# Patient Record
Sex: MALE | Race: WHITE | NOT HISPANIC OR LATINO | Employment: OTHER | URBAN - METROPOLITAN AREA
[De-identification: names, ages, dates, MRNs, and addresses within clinical notes are randomized per-mention and may not be internally consistent; named-entity substitution may affect disease eponyms.]

---

## 2017-01-23 ENCOUNTER — HOSPITAL ENCOUNTER (INPATIENT)
Facility: CLINIC | Age: 42
LOS: 8 days | Discharge: HOME | DRG: 021 | End: 2017-02-01
Attending: EMERGENCY MEDICINE | Admitting: INTERNAL MEDICINE
Payer: COMMERCIAL

## 2017-01-23 ENCOUNTER — APPOINTMENT (OUTPATIENT)
Dept: CT IMAGING | Facility: CLINIC | Age: 42
DRG: 021 | End: 2017-01-23
Attending: EMERGENCY MEDICINE
Payer: COMMERCIAL

## 2017-01-23 ENCOUNTER — ANESTHESIA EVENT (OUTPATIENT)
Dept: SURGERY | Facility: CLINIC | Age: 42
DRG: 021 | End: 2017-01-23
Payer: COMMERCIAL

## 2017-01-23 ENCOUNTER — APPOINTMENT (OUTPATIENT)
Dept: INTERVENTIONAL RADIOLOGY/VASCULAR | Facility: CLINIC | Age: 42
DRG: 021 | End: 2017-01-23
Attending: PSYCHIATRY & NEUROLOGY
Payer: COMMERCIAL

## 2017-01-23 ENCOUNTER — APPOINTMENT (OUTPATIENT)
Dept: INTERVENTIONAL RADIOLOGY/VASCULAR | Facility: CLINIC | Age: 42
DRG: 021 | End: 2017-01-23
Payer: COMMERCIAL

## 2017-01-23 ENCOUNTER — ANESTHESIA (OUTPATIENT)
Dept: SURGERY | Facility: CLINIC | Age: 42
DRG: 021 | End: 2017-01-23
Payer: COMMERCIAL

## 2017-01-23 DIAGNOSIS — I10 MALIGNANT ESSENTIAL HYPERTENSION: ICD-10-CM

## 2017-01-23 DIAGNOSIS — E87.1 HYPONATREMIA: ICD-10-CM

## 2017-01-23 DIAGNOSIS — R56.9 CONVULSIONS, UNSPECIFIED CONVULSION TYPE (H): ICD-10-CM

## 2017-01-23 DIAGNOSIS — G91.9 HYDROCEPHALUS (H): ICD-10-CM

## 2017-01-23 DIAGNOSIS — I60.9 SUBARACHNOID HEMORRHAGE (H): Primary | ICD-10-CM

## 2017-01-23 DIAGNOSIS — I60.7 CEREBRAL ANEURYSM RUPTURE (H): ICD-10-CM

## 2017-01-23 LAB
ABO + RH BLD: NORMAL
ABO + RH BLD: NORMAL
ANION GAP SERPL CALCULATED.3IONS-SCNC: 6 MMOL/L (ref 3–14)
BASOPHILS # BLD AUTO: 0 10E9/L (ref 0–0.2)
BASOPHILS NFR BLD AUTO: 0.3 %
BLD GP AB SCN SERPL QL: NORMAL
BLOOD BANK CMNT PATIENT-IMP: NORMAL
BUN SERPL-MCNC: 12 MG/DL (ref 7–30)
CALCIUM SERPL-MCNC: 8.6 MG/DL (ref 8.5–10.1)
CHLORIDE SERPL-SCNC: 105 MMOL/L (ref 94–109)
CHOLEST SERPL-MCNC: 129 MG/DL
CO2 SERPL-SCNC: 29 MMOL/L (ref 20–32)
CREAT SERPL-MCNC: 0.68 MG/DL (ref 0.66–1.25)
CRP SERPL-MCNC: <2.9 MG/L (ref 0–8)
DIFFERENTIAL METHOD BLD: NORMAL
EOSINOPHIL # BLD AUTO: 0.2 10E9/L (ref 0–0.7)
EOSINOPHIL NFR BLD AUTO: 3.8 %
ERYTHROCYTE [DISTWIDTH] IN BLOOD BY AUTOMATED COUNT: 12.4 % (ref 10–15)
GFR SERPL CREATININE-BSD FRML MDRD: ABNORMAL ML/MIN/1.7M2
GLUCOSE BLDC GLUCOMTR-MCNC: 139 MG/DL (ref 70–99)
GLUCOSE SERPL-MCNC: 124 MG/DL (ref 70–99)
HBA1C MFR BLD: 4.6 % (ref 4.3–6)
HCT VFR BLD AUTO: 40 % (ref 40–53)
HDLC SERPL-MCNC: 43 MG/DL
HGB BLD-MCNC: 14.4 G/DL (ref 13.3–17.7)
IMM GRANULOCYTES # BLD: 0.1 10E9/L (ref 0–0.4)
IMM GRANULOCYTES NFR BLD: 1.9 %
INTERPRETATION ECG - MUSE: NORMAL
LDLC SERPL CALC-MCNC: 68 MG/DL
LYMPHOCYTES # BLD AUTO: 1.7 10E9/L (ref 0.8–5.3)
LYMPHOCYTES NFR BLD AUTO: 26.2 %
MCH RBC QN AUTO: 32.1 PG (ref 26.5–33)
MCHC RBC AUTO-ENTMCNC: 36 G/DL (ref 31.5–36.5)
MCV RBC AUTO: 89 FL (ref 78–100)
MONOCYTES # BLD AUTO: 1.1 10E9/L (ref 0–1.3)
MONOCYTES NFR BLD AUTO: 16.7 %
NEUTROPHILS # BLD AUTO: 3.2 10E9/L (ref 1.6–8.3)
NEUTROPHILS NFR BLD AUTO: 51.1 %
NONHDLC SERPL-MCNC: 86 MG/DL
NRBC # BLD AUTO: 0 10*3/UL
NRBC BLD AUTO-RTO: 0 /100
PLATELET # BLD AUTO: 180 10E9/L (ref 150–450)
POTASSIUM SERPL-SCNC: 3.5 MMOL/L (ref 3.4–5.3)
RADIOLOGIST FLAGS: ABNORMAL
RBC # BLD AUTO: 4.48 10E12/L (ref 4.4–5.9)
SODIUM SERPL-SCNC: 140 MMOL/L (ref 133–144)
SPECIMEN EXP DATE BLD: NORMAL
TRIGL SERPL-MCNC: 91 MG/DL
TROPONIN I SERPL-MCNC: 0.19 UG/L (ref 0–0.04)
TSH SERPL DL<=0.005 MIU/L-ACNC: 0.61 MU/L (ref 0.4–4)
WBC # BLD AUTO: 6.3 10E9/L (ref 4–11)

## 2017-01-23 PROCEDURE — 25000128 H RX IP 250 OP 636: Performed by: RADIOLOGY

## 2017-01-23 PROCEDURE — 85347 COAGULATION TIME ACTIVATED: CPT

## 2017-01-23 PROCEDURE — 70460 CT HEAD/BRAIN W/DYE: CPT

## 2017-01-23 PROCEDURE — 27210894 ZZH CATH CR6

## 2017-01-23 PROCEDURE — 99285 EMERGENCY DEPT VISIT HI MDM: CPT | Mod: 25

## 2017-01-23 PROCEDURE — 27210889 IR CAROTID CEREBRAL ANGIOGRAM BILATERAL

## 2017-01-23 PROCEDURE — 96365 THER/PROPH/DIAG IV INF INIT: CPT

## 2017-01-23 PROCEDURE — 80061 LIPID PANEL: CPT | Performed by: INTERNAL MEDICINE

## 2017-01-23 PROCEDURE — 80048 BASIC METABOLIC PNL TOTAL CA: CPT | Performed by: EMERGENCY MEDICINE

## 2017-01-23 PROCEDURE — 03LG3DZ OCCLUSION OF INTRACRANIAL ARTERY WITH INTRALUMINAL DEVICE, PERCUTANEOUS APPROACH: ICD-10-PCS | Performed by: RADIOLOGY

## 2017-01-23 PROCEDURE — 00000146 ZZHCL STATISTIC GLUCOSE BY METER IP

## 2017-01-23 PROCEDURE — 70470 CT HEAD/BRAIN W/O & W/DYE: CPT

## 2017-01-23 PROCEDURE — 85025 COMPLETE CBC W/AUTO DIFF WBC: CPT | Performed by: EMERGENCY MEDICINE

## 2017-01-23 PROCEDURE — 25000128 H RX IP 250 OP 636: Performed by: NURSE ANESTHETIST, CERTIFIED REGISTERED

## 2017-01-23 PROCEDURE — 37000009 ZZH ANESTHESIA TECHNICAL FEE, EACH ADDTL 15 MIN

## 2017-01-23 PROCEDURE — 37000008 ZZH ANESTHESIA TECHNICAL FEE, 1ST 30 MIN

## 2017-01-23 PROCEDURE — 96375 TX/PRO/DX INJ NEW DRUG ADDON: CPT

## 2017-01-23 PROCEDURE — 86901 BLOOD TYPING SEROLOGIC RH(D): CPT | Performed by: ANESTHESIOLOGY

## 2017-01-23 PROCEDURE — 25000125 ZZHC RX 250

## 2017-01-23 PROCEDURE — 36415 COLL VENOUS BLD VENIPUNCTURE: CPT | Performed by: INTERNAL MEDICINE

## 2017-01-23 PROCEDURE — 96366 THER/PROPH/DIAG IV INF ADDON: CPT

## 2017-01-23 PROCEDURE — 25000125 ZZHC RX 250: Performed by: NURSE ANESTHETIST, CERTIFIED REGISTERED

## 2017-01-23 PROCEDURE — 25800025 ZZH RX 258: Performed by: ANESTHESIOLOGY

## 2017-01-23 PROCEDURE — 27210738 ZZH ACCESSORY CR2

## 2017-01-23 PROCEDURE — 96368 THER/DIAG CONCURRENT INF: CPT

## 2017-01-23 PROCEDURE — 84484 ASSAY OF TROPONIN QUANT: CPT | Performed by: INTERNAL MEDICINE

## 2017-01-23 PROCEDURE — 25000125 ZZHC RX 250: Performed by: EMERGENCY MEDICINE

## 2017-01-23 PROCEDURE — 83036 HEMOGLOBIN GLYCOSYLATED A1C: CPT | Performed by: INTERNAL MEDICINE

## 2017-01-23 PROCEDURE — 71000012 ZZH RECOVERY PHASE 1 LEVEL 1 FIRST HR

## 2017-01-23 PROCEDURE — 86140 C-REACTIVE PROTEIN: CPT | Performed by: INTERNAL MEDICINE

## 2017-01-23 PROCEDURE — 82306 VITAMIN D 25 HYDROXY: CPT | Performed by: INTERNAL MEDICINE

## 2017-01-23 PROCEDURE — C1769 GUIDE WIRE: HCPCS

## 2017-01-23 PROCEDURE — 25000132 ZZH RX MED GY IP 250 OP 250 PS 637

## 2017-01-23 PROCEDURE — 25000128 H RX IP 250 OP 636: Performed by: PSYCHIATRY & NEUROLOGY

## 2017-01-23 PROCEDURE — 25000128 H RX IP 250 OP 636: Performed by: EMERGENCY MEDICINE

## 2017-01-23 PROCEDURE — 25000125 ZZHC RX 250: Performed by: PSYCHIATRY & NEUROLOGY

## 2017-01-23 PROCEDURE — 86850 RBC ANTIBODY SCREEN: CPT | Performed by: ANESTHESIOLOGY

## 2017-01-23 PROCEDURE — 27210806 ZZH SHEATH CR5

## 2017-01-23 PROCEDURE — 25500064 ZZH RX 255 OP 636: Performed by: RADIOLOGY

## 2017-01-23 PROCEDURE — 93005 ELECTROCARDIOGRAM TRACING: CPT

## 2017-01-23 PROCEDURE — 25000125 ZZHC RX 250: Performed by: RADIOLOGY

## 2017-01-23 PROCEDURE — 27210732 ZZH ACCESSORY CR1

## 2017-01-23 PROCEDURE — 25500064 ZZH RX 255 OP 636: Performed by: EMERGENCY MEDICINE

## 2017-01-23 PROCEDURE — 99223 1ST HOSP IP/OBS HIGH 75: CPT | Mod: AI | Performed by: INTERNAL MEDICINE

## 2017-01-23 PROCEDURE — 40000333 IR DISCONTINUE SHEATH: Mod: TC

## 2017-01-23 PROCEDURE — 40000171 ZZH STATISTIC PRE-PROCEDURE ASSESSMENT III

## 2017-01-23 PROCEDURE — 84443 ASSAY THYROID STIM HORMONE: CPT | Performed by: INTERNAL MEDICINE

## 2017-01-23 PROCEDURE — C1887 CATHETER, GUIDING: HCPCS

## 2017-01-23 PROCEDURE — 86900 BLOOD TYPING SEROLOGIC ABO: CPT | Performed by: ANESTHESIOLOGY

## 2017-01-23 PROCEDURE — 27211141 ZZHC CATH NEURO CR18

## 2017-01-23 PROCEDURE — 25000125 ZZHC RX 250: Performed by: ANESTHESIOLOGY

## 2017-01-23 PROCEDURE — 25000566 ZZH SEVOFLURANE, EA 15 MIN

## 2017-01-23 PROCEDURE — 76377 3D RENDER W/INTRP POSTPROCES: CPT

## 2017-01-23 RX ORDER — HYDROMORPHONE HYDROCHLORIDE 1 MG/ML
.3-.5 INJECTION, SOLUTION INTRAMUSCULAR; INTRAVENOUS; SUBCUTANEOUS EVERY 5 MIN PRN
Status: DISCONTINUED | OUTPATIENT
Start: 2017-01-23 | End: 2017-01-23

## 2017-01-23 RX ORDER — SODIUM CHLORIDE, SODIUM LACTATE, POTASSIUM CHLORIDE, CALCIUM CHLORIDE 600; 310; 30; 20 MG/100ML; MG/100ML; MG/100ML; MG/100ML
INJECTION, SOLUTION INTRAVENOUS CONTINUOUS
Status: DISCONTINUED | OUTPATIENT
Start: 2017-01-23 | End: 2017-01-23 | Stop reason: HOSPADM

## 2017-01-23 RX ORDER — ACETAMINOPHEN 650 MG/1
650 SUPPOSITORY RECTAL EVERY 4 HOURS PRN
Status: DISCONTINUED | OUTPATIENT
Start: 2017-01-23 | End: 2017-02-01 | Stop reason: HOSPADM

## 2017-01-23 RX ORDER — ONDANSETRON 2 MG/ML
4 INJECTION INTRAMUSCULAR; INTRAVENOUS EVERY 6 HOURS PRN
Status: DISCONTINUED | OUTPATIENT
Start: 2017-01-23 | End: 2017-02-01 | Stop reason: HOSPADM

## 2017-01-23 RX ORDER — LIDOCAINE HYDROCHLORIDE 20 MG/ML
INJECTION, SOLUTION INFILTRATION; PERINEURAL PRN
Status: DISCONTINUED | OUTPATIENT
Start: 2017-01-23 | End: 2017-01-23

## 2017-01-23 RX ORDER — METOCLOPRAMIDE 5 MG/1
10 TABLET ORAL EVERY 6 HOURS PRN
Status: DISCONTINUED | OUTPATIENT
Start: 2017-01-23 | End: 2017-02-01 | Stop reason: HOSPADM

## 2017-01-23 RX ORDER — NALOXONE HYDROCHLORIDE 0.4 MG/ML
.1-.4 INJECTION, SOLUTION INTRAMUSCULAR; INTRAVENOUS; SUBCUTANEOUS
Status: DISCONTINUED | OUTPATIENT
Start: 2017-01-23 | End: 2017-02-01 | Stop reason: HOSPADM

## 2017-01-23 RX ORDER — IODIXANOL 320 MG/ML
50 INJECTION, SOLUTION INTRAVASCULAR ONCE
Status: COMPLETED | OUTPATIENT
Start: 2017-01-23 | End: 2017-01-23

## 2017-01-23 RX ORDER — ONDANSETRON 4 MG/1
4 TABLET, ORALLY DISINTEGRATING ORAL EVERY 30 MIN PRN
Status: DISCONTINUED | OUTPATIENT
Start: 2017-01-23 | End: 2017-01-23

## 2017-01-23 RX ORDER — ONDANSETRON 2 MG/ML
4 INJECTION INTRAMUSCULAR; INTRAVENOUS EVERY 30 MIN PRN
Status: DISCONTINUED | OUTPATIENT
Start: 2017-01-23 | End: 2017-01-23

## 2017-01-23 RX ORDER — IOPAMIDOL 612 MG/ML
150 INJECTION, SOLUTION INTRAVASCULAR ONCE
Status: DISCONTINUED | OUTPATIENT
Start: 2017-01-23 | End: 2017-01-23 | Stop reason: HOSPADM

## 2017-01-23 RX ORDER — ACETAMINOPHEN 325 MG/1
TABLET ORAL
Status: COMPLETED
Start: 2017-01-23 | End: 2017-01-23

## 2017-01-23 RX ORDER — NEOSTIGMINE METHYLSULFATE 1 MG/ML
VIAL (ML) INJECTION PRN
Status: DISCONTINUED | OUTPATIENT
Start: 2017-01-23 | End: 2017-01-23

## 2017-01-23 RX ORDER — IOPAMIDOL 612 MG/ML
150 INJECTION, SOLUTION INTRAVASCULAR ONCE
Status: COMPLETED | OUTPATIENT
Start: 2017-01-23 | End: 2017-01-23

## 2017-01-23 RX ORDER — IOPAMIDOL 755 MG/ML
120 INJECTION, SOLUTION INTRAVASCULAR ONCE
Status: COMPLETED | OUTPATIENT
Start: 2017-01-23 | End: 2017-01-23

## 2017-01-23 RX ORDER — HEPARIN SODIUM 1000 [USP'U]/ML
INJECTION, SOLUTION INTRAVENOUS; SUBCUTANEOUS
Status: DISPENSED
Start: 2017-01-23

## 2017-01-23 RX ORDER — PROCHLORPERAZINE 25 MG
25 SUPPOSITORY, RECTAL RECTAL EVERY 12 HOURS PRN
Status: DISCONTINUED | OUTPATIENT
Start: 2017-01-23 | End: 2017-02-01 | Stop reason: HOSPADM

## 2017-01-23 RX ORDER — FENTANYL CITRATE 50 UG/ML
INJECTION, SOLUTION INTRAMUSCULAR; INTRAVENOUS
Status: COMPLETED
Start: 2017-01-23 | End: 2017-01-23

## 2017-01-23 RX ORDER — MULTIPLE VITAMINS W/ MINERALS TAB 9MG-400MCG
1 TAB ORAL DAILY
COMMUNITY

## 2017-01-23 RX ORDER — SODIUM CHLORIDE, SODIUM LACTATE, POTASSIUM CHLORIDE, CALCIUM CHLORIDE 600; 310; 30; 20 MG/100ML; MG/100ML; MG/100ML; MG/100ML
INJECTION, SOLUTION INTRAVENOUS CONTINUOUS
Status: DISCONTINUED | OUTPATIENT
Start: 2017-01-23 | End: 2017-01-23

## 2017-01-23 RX ORDER — FENTANYL CITRATE 50 UG/ML
INJECTION, SOLUTION INTRAMUSCULAR; INTRAVENOUS PRN
Status: DISCONTINUED | OUTPATIENT
Start: 2017-01-23 | End: 2017-01-23

## 2017-01-23 RX ORDER — FENTANYL CITRATE 50 UG/ML
25-50 INJECTION, SOLUTION INTRAMUSCULAR; INTRAVENOUS
Status: DISCONTINUED | OUTPATIENT
Start: 2017-01-23 | End: 2017-01-23

## 2017-01-23 RX ORDER — SODIUM CHLORIDE, SODIUM LACTATE, POTASSIUM CHLORIDE, CALCIUM CHLORIDE 600; 310; 30; 20 MG/100ML; MG/100ML; MG/100ML; MG/100ML
INJECTION, SOLUTION INTRAVENOUS CONTINUOUS
Status: DISCONTINUED | OUTPATIENT
Start: 2017-01-23 | End: 2017-01-24

## 2017-01-23 RX ORDER — OXYCODONE HYDROCHLORIDE 5 MG/1
5-10 TABLET ORAL
Status: DISCONTINUED | OUTPATIENT
Start: 2017-01-23 | End: 2017-02-01 | Stop reason: HOSPADM

## 2017-01-23 RX ORDER — GLYCOPYRROLATE 0.2 MG/ML
INJECTION, SOLUTION INTRAMUSCULAR; INTRAVENOUS PRN
Status: DISCONTINUED | OUTPATIENT
Start: 2017-01-23 | End: 2017-01-23

## 2017-01-23 RX ORDER — METOCLOPRAMIDE HYDROCHLORIDE 5 MG/ML
10 INJECTION INTRAMUSCULAR; INTRAVENOUS EVERY 6 HOURS PRN
Status: DISCONTINUED | OUTPATIENT
Start: 2017-01-23 | End: 2017-02-01 | Stop reason: HOSPADM

## 2017-01-23 RX ORDER — ACETAMINOPHEN 325 MG/1
650 TABLET ORAL EVERY 4 HOURS PRN
Status: DISCONTINUED | OUTPATIENT
Start: 2017-01-23 | End: 2017-02-01 | Stop reason: HOSPADM

## 2017-01-23 RX ORDER — ONDANSETRON 2 MG/ML
4 INJECTION INTRAMUSCULAR; INTRAVENOUS ONCE
Status: COMPLETED | OUTPATIENT
Start: 2017-01-23 | End: 2017-01-23

## 2017-01-23 RX ORDER — SODIUM CHLORIDE 9 MG/ML
1000 INJECTION, SOLUTION INTRAVENOUS CONTINUOUS
Status: DISCONTINUED | OUTPATIENT
Start: 2017-01-23 | End: 2017-01-26

## 2017-01-23 RX ORDER — ONDANSETRON 4 MG/1
4 TABLET, ORALLY DISINTEGRATING ORAL EVERY 6 HOURS PRN
Status: DISCONTINUED | OUTPATIENT
Start: 2017-01-23 | End: 2017-02-01 | Stop reason: HOSPADM

## 2017-01-23 RX ORDER — HEPARIN SODIUM 1000 [USP'U]/ML
INJECTION, SOLUTION INTRAVENOUS; SUBCUTANEOUS CONTINUOUS PRN
Status: DISCONTINUED | OUTPATIENT
Start: 2017-01-23 | End: 2017-01-23

## 2017-01-23 RX ORDER — HYDROMORPHONE HYDROCHLORIDE 1 MG/ML
0.2 INJECTION, SOLUTION INTRAMUSCULAR; INTRAVENOUS; SUBCUTANEOUS
Status: DISCONTINUED | OUTPATIENT
Start: 2017-01-23 | End: 2017-01-29

## 2017-01-23 RX ORDER — HEPARIN SODIUM 1000 [USP'U]/ML
INJECTION, SOLUTION INTRAVENOUS; SUBCUTANEOUS
Status: COMPLETED
Start: 2017-01-23 | End: 2017-01-23

## 2017-01-23 RX ORDER — PROCHLORPERAZINE MALEATE 5 MG
5-10 TABLET ORAL EVERY 6 HOURS PRN
Status: DISCONTINUED | OUTPATIENT
Start: 2017-01-23 | End: 2017-02-01 | Stop reason: HOSPADM

## 2017-01-23 RX ORDER — ONDANSETRON 2 MG/ML
INJECTION INTRAMUSCULAR; INTRAVENOUS PRN
Status: DISCONTINUED | OUTPATIENT
Start: 2017-01-23 | End: 2017-01-23

## 2017-01-23 RX ORDER — FENTANYL CITRATE 50 UG/ML
50 INJECTION, SOLUTION INTRAMUSCULAR; INTRAVENOUS ONCE
Status: COMPLETED | OUTPATIENT
Start: 2017-01-23 | End: 2017-01-23

## 2017-01-23 RX ADMIN — METOCLOPRAMIDE 10 MG: 5 INJECTION, SOLUTION INTRAMUSCULAR; INTRAVENOUS at 20:27

## 2017-01-23 RX ADMIN — ONDANSETRON HYDROCHLORIDE 4 MG: 2 INJECTION, SOLUTION INTRAMUSCULAR; INTRAVENOUS at 18:20

## 2017-01-23 RX ADMIN — LEVETIRACETAM 500 MG: 100 INJECTION, SOLUTION INTRAVENOUS at 10:27

## 2017-01-23 RX ADMIN — NEOSTIGMINE METHYLSULFATE 5 MG: 1 INJECTION INTRAMUSCULAR; INTRAVENOUS; SUBCUTANEOUS at 15:00

## 2017-01-23 RX ADMIN — SODIUM CHLORIDE, POTASSIUM CHLORIDE, SODIUM LACTATE AND CALCIUM CHLORIDE: 600; 310; 30; 20 INJECTION, SOLUTION INTRAVENOUS at 12:25

## 2017-01-23 RX ADMIN — ONDANSETRON 4 MG: 2 INJECTION INTRAMUSCULAR; INTRAVENOUS at 14:42

## 2017-01-23 RX ADMIN — HYDROMORPHONE HYDROCHLORIDE 0.2 MG: 1 INJECTION, SOLUTION INTRAMUSCULAR; INTRAVENOUS; SUBCUTANEOUS at 22:55

## 2017-01-23 RX ADMIN — ROCURONIUM BROMIDE 50 MG: 10 INJECTION INTRAVENOUS at 12:41

## 2017-01-23 RX ADMIN — SODIUM CHLORIDE, POTASSIUM CHLORIDE, SODIUM LACTATE AND CALCIUM CHLORIDE: 600; 310; 30; 20 INJECTION, SOLUTION INTRAVENOUS at 15:09

## 2017-01-23 RX ADMIN — FENTANYL CITRATE 50 MCG: 50 INJECTION, SOLUTION INTRAMUSCULAR; INTRAVENOUS at 10:39

## 2017-01-23 RX ADMIN — SODIUM CHLORIDE 1000 ML: 9 INJECTION, SOLUTION INTRAVENOUS at 09:24

## 2017-01-23 RX ADMIN — NICARDIPINE HYDROCHLORIDE 7.5 MG/HR: 0.2 INJECTION, SOLUTION INTRAVENOUS at 09:45

## 2017-01-23 RX ADMIN — NICARDIPINE HYDROCHLORIDE: 0.2 INJECTION, SOLUTION INTRAVENOUS at 15:15

## 2017-01-23 RX ADMIN — LIDOCAINE HYDROCHLORIDE 50 MG: 20 INJECTION, SOLUTION INFILTRATION; PERINEURAL at 12:41

## 2017-01-23 RX ADMIN — ONDANSETRON HYDROCHLORIDE 4 MG: 2 SOLUTION INTRAMUSCULAR; INTRAVENOUS at 09:09

## 2017-01-23 RX ADMIN — SODIUM CHLORIDE 1000 ML: 9 INJECTION, SOLUTION INTRAVENOUS at 11:16

## 2017-01-23 RX ADMIN — HYDROMORPHONE HYDROCHLORIDE 0.2 MG: 1 INJECTION, SOLUTION INTRAMUSCULAR; INTRAVENOUS; SUBCUTANEOUS at 20:34

## 2017-01-23 RX ADMIN — FENTANYL CITRATE 50 MCG: 50 INJECTION, SOLUTION INTRAMUSCULAR; INTRAVENOUS at 12:41

## 2017-01-23 RX ADMIN — HYDROMORPHONE HYDROCHLORIDE 0.2 MG: 1 INJECTION, SOLUTION INTRAMUSCULAR; INTRAVENOUS; SUBCUTANEOUS at 18:09

## 2017-01-23 RX ADMIN — ACETAMINOPHEN 650 MG: 325 TABLET, FILM COATED ORAL at 19:27

## 2017-01-23 RX ADMIN — IOPAMIDOL 120 ML: 755 INJECTION, SOLUTION INTRAVENOUS at 10:22

## 2017-01-23 RX ADMIN — CISATRACURIUM BESYLATE 6 MG: 2 INJECTION INTRAVENOUS at 13:47

## 2017-01-23 RX ADMIN — SODIUM CHLORIDE, POTASSIUM CHLORIDE, SODIUM LACTATE AND CALCIUM CHLORIDE: 600; 310; 30; 20 INJECTION, SOLUTION INTRAVENOUS at 22:57

## 2017-01-23 RX ADMIN — IOPAMIDOL 135 ML: 612 INJECTION, SOLUTION INTRAVENOUS at 15:12

## 2017-01-23 RX ADMIN — IODIXANOL 20 ML: 320 INJECTION, SOLUTION INTRAVASCULAR at 15:12

## 2017-01-23 RX ADMIN — FENTANYL CITRATE 50 MCG: 50 INJECTION, SOLUTION INTRAMUSCULAR; INTRAVENOUS at 13:05

## 2017-01-23 RX ADMIN — LEVETIRACETAM 500 MG: 100 INJECTION, SOLUTION INTRAVENOUS at 22:57

## 2017-01-23 RX ADMIN — HYDROMORPHONE HYDROCHLORIDE 0.3 MG: 1 INJECTION, SOLUTION INTRAMUSCULAR; INTRAVENOUS; SUBCUTANEOUS at 17:40

## 2017-01-23 RX ADMIN — GLYCOPYRROLATE 1 MG: 0.2 INJECTION, SOLUTION INTRAMUSCULAR; INTRAVENOUS at 15:00

## 2017-01-23 RX ADMIN — SODIUM CHLORIDE: 9 INJECTION, SOLUTION INTRAVENOUS at 12:25

## 2017-01-23 RX ADMIN — SODIUM CHLORIDE 100 ML: 9 INJECTION, SOLUTION INTRAVENOUS at 10:22

## 2017-01-23 RX ADMIN — SODIUM CHLORIDE, POTASSIUM CHLORIDE, SODIUM LACTATE AND CALCIUM CHLORIDE: 600; 310; 30; 20 INJECTION, SOLUTION INTRAVENOUS at 17:51

## 2017-01-23 ASSESSMENT — ENCOUNTER SYMPTOMS
ORTHOPNEA: 0
SEIZURES: 1

## 2017-01-23 ASSESSMENT — LIFESTYLE VARIABLES: TOBACCO_USE: 0

## 2017-01-23 NOTE — IP AVS SNAPSHOT
[facility]    [address]    [place] [address]    Phone:  [phone]                                       After Visit Summary   1/23/2017    Malvin Au    MRN: [id number]           After Visit Summary Signature Page     I have received my discharge instructions, and my questions have been answered. I have discussed any challenges I see with this plan with the nurse or doctor.    ..........................................................................................................................................  Patient/Patient Representative Signature      ..........................................................................................................................................  Patient Representative Print Name and Relationship to Patient    ..................................................               ................................................  Date                                            Time    ..........................................................................................................................................  Reviewed by Signature/Title    ...................................................              ..............................................  Date                                                            Time

## 2017-01-23 NOTE — CONSULTS
[facility] [place]  [facility]    NeuroCritical Care Consultation Note     Malvin Au MRN# [id number]   YOB: 1975 Age: 41 year old    Code Status:No Order   Date of Admission: 1/23/2017  Date of Consult: 01/23/2017    _________________________________   Primary Care Physician  No primary care provider on file.  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   #. DVT Prophylaxis  --Mechanical only due to SAH  #. PT/OT/Speech  --Start  evaluations  #. Nutrition / GI Prophylaxis  --Per recommendations of speech therapy      #. Code Status: Full Code     TIME:   Neurocritical care time:  60 minutes, excluding procedures for evaluation and management of SAH. Patient has a very high risk of deterioration  ----------------------------------------------------------------------------------  ----------------------------------------------------------------------------------  Reason for consult: Code stroke    Chief Complaint  ______________________________________________  Left sided weakness, seizure  History is obtained from the patient    History of Present Illness  ______________________________________________  41 year old male with a history of hypertension who presents with an altered mental status. 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  Upon EMS arrival, he was  drooling, hypertensive at 213/159, and remained unresponsive despite an aggressive sternum rub; they did not notice any tonic clonic behavior or any obvious trauma. Patient was incontinent. When he finally awoke, he was initially confused with post-ictal behavior and his short-term memory was not intact. In ER, he is more oriented and memory is improving. The patient states he does not recall any of the events this morning, but does recall being at home last night watching hockey. When prompted, the patient reports that he has been having constant daily headaches for the past few weeks that slightly improve when treated with Ibuprofen. He denies any pain. Patient was found to have left leg weakness, thus code sandra was called.   CT head demonstrated SAH likely originating from Acom A aneurysm.     Past Medical History   ______________________________________________  History reviewed. No pertinent past medical history.  Past Surgical History  ______________________________________________  History reviewed. No pertinent past surgical history.  Prior to Admission Medications  ______________________________________________  None     Allergies  No Known Allergies    Social History  ______________________________________________  Social History     Social History     Marital Status: Single     Spouse Name: N/A     Number of Children: N/A     Years of Education: N/A     Social History Main Topics     Smoking status: Never Smoker      Smokeless tobacco: None     Alcohol Use: Yes     Drug Use: No     Sexual Activity: Not Asked     Other Topics Concern     None     Social History Narrative     None         Family History  ______________________________________________  Reviewed and not felt to be contributory.     Review of Systems  ______________________________________________  A comprehensive review of  10 systems was performed and found to be negative except as described in this note  CONSTITUTIONAL: negative for fever,  "chills, change in weight  INTEGUMENTARY/SKIN: no rash or obvious new lesions  ENT/MOUTH: no sore throat, new sinus pain or nasal drainage, no neck mass noted  RESP: No pleuretic pain, No cough, no hemoptysis, No SOB   CV: negative for chest pain, palpitations or peripheral edema  GI: no nausea, vomiting, change in stools  : no dysuria or hematuria  MUSCULOSKELETAL: no myalgias, arthralgias or join efffusion  ENDOCRINE: no history of polyuria, polydyspsia or symptoms of thyroid dysfunction  PSYCHIATRIC: no change in mood stable  LYMPHATIC: no new lymphadenopathy  HEME: no bleeding or easy bruisability  NEURO: see HPI    Physical Exam  ______________________________________________  Weight:0 lbs 0 oz; Height:6' 0\"  Temp: 98.1  F (36.7  C) Temp src: Oral BP: (!) 181/115 mmHg   Heart Rate: 89 Resp: 20 SpO2: 94 % O2 Device: None (Room air)    General Appearance:  No acute distress  Neuro:       Mental Status Exam:   Awake, alert, oriented X3. Speech and language are intact. Mental status is normal       Cranial Nerves:  Pupils 3 mm, reactive. EOMI. Face sensation is normal. Face is symmetric.Tongue and uvula are midline. Other CN are normal           Motor:  5/5 X 4. Tone and bulk are normal           Reflexes:  Normal DTR.Toes downgoing.        Sensory:  Normal to PP, LT, vibration and LUCIO                   Coordination:   Intact finger-to-nose and toe-to-finger tests       Gait:  Untestable  Neck: no nuchal rigidity, normal thyroid. No carotid bruits.    Cardiovascular: Regular rate and rhythm, no m/r/g  Lungs: Clear to auscultation  Abdomen: Soft, not tender, not distended  Extremities: No clubbing, no cyanosis, no edema    Data  ______________________________________________  All Data personally reviewed:       Labs:   CBC RESULTS:     Recent Labs  Lab 01/23/17  0915   WBC 6.3   RBC 4.48   HGB 14.4   HCT 40.0        Basic Metabolic Panel:   No lab results found.  Liver panel:  No lab results found.  INR:No " lab results found.   Lipid Profile:No lab results found.  Thyroid Panel:No lab results found.   Vitamin B12: No lab results found.   Vitamin D level: No lab results found.  A1C: No lab results found.  Troponin I: No lab results found.  Ammonia: No lab results found.  CK: No lab results found.     CRP inflammation: No lab results found.  ESR: No lab results found.    MARY: No lab results found.    ANCA: No lab results found.   Drug Screen: No lab results found.  Alcohol level:No lab results found.  UA Results:  No lab results found.  Most Recent 6 Bacteria Isolates From Any Culture (See EPIC Reports for Culture Details):No lab results found.     Cardiac US:   ---       Imaging:   All imaging studies were reviewed personally  CT head:   --Difffuse SAH, likely secondary to acom A aneurysm   CTA neck/head:  --pending

## 2017-01-23 NOTE — ANESTHESIA PREPROCEDURE EVALUATION
Anesthesia Evaluation     . Pt has not had prior anesthetic       ROS/MED HX    ENT/Pulmonary:      (-) tobacco use and asthma   Neurologic: Comment: Unconscious at work    CT OF THE HEAD WITHOUT CONTRAST  1/23/2017 9:42 AM       COMPARISON: None.     HISTORY:  Stroke.     TECHNIQUE: Axial CT images of the head from the skull base to the  vertex were acquired without IV contrast.     FINDINGS: There is hyperdense material filling the suprasellar  cistern, prepontine and premedullary cisterns as well as the inferior  aspect of the anterior interhemispheric fissure and multiple sulci  along the medial aspects of the cerebral hemispheres on both sides.  This is consistent with extensive subarachnoid hemorrhage in a  distribution that suggests aneurysmal rupture.       The lateral and third ventricles are mildly prominent in size  consistent with developing hydrocephalus. There are small foci of  intraventricular hemorrhage layering in the occipital horn of the  right lateral ventricle and left lateral aspect of the fourth  ventricle. This likely represents redistribution of subarachnoid  hemorrhage in the ventricular system.     There is no midline shift. There is no evidence for ischemic infarct.  There is no evidence for intracranial mass.     There is no sinusitis or mastoiditis. There are no fractures.                                                                       IMPRESSION:  1. Extensive subarachnoid hemorrhage in a pattern that suggests  aneurysmal rupture.  2. Mild hydrocephalus.  3. No evidence for ischemic infarct.    (+)seizures CVA     Cardiovascular: Comment: LVH, LAD    BP labile, no meds for HTN pre hospitalization     (+) hypertension----. : . . . :. .      (-) GREENE and orthopnea/PND   METS/Exercise Tolerance:  >4 METS   Hematologic:         Musculoskeletal:         GI/Hepatic:         Renal/Genitourinary:         Endo:         Psychiatric:         Infectious Disease:         Malignancy:          Other:               Physical Exam      Airway   Mallampati: II  TM distance: >3 FB  Neck ROM: full    Dental   (+) caps    Cardiovascular   Rhythm and rate: regular      Pulmonary    breath sounds clear to auscultation                    Anesthesia Plan      History & Physical Review  History and physical reviewed and following examination; no interval change.    ASA Status:  4 emergent.        Plan for General and ETT     Additional equipment: Videolaryngoscope glidescope      Postoperative Care      Consents  Anesthetic plan, risks, benefits and alternatives discussed with:  Patient.  Use of blood products discussed: Yes.   .                          .  Procedure: Procedure(s):  ANESTHESIA OUT OF OR MRI  Preop diagnosis: UNKNOWN    No Known Allergies  History reviewed. No pertinent past medical history.  History reviewed. No pertinent past surgical history.  Prior to Admission medications    Medication Sig Start Date End Date Taking? Authorizing Provider   MOTRIN IB PO Take 600 mg by mouth 4 times daily as needed for moderate pain   Yes Unknown, Entered By History   multivitamin, therapeutic with minerals (THERA-VIT-M) TABS tablet Take 1 tablet by mouth daily   Yes Unknown, Entered By History   VITAMIN D, CHOLECALCIFEROL, PO Take by mouth daily OTC   Yes Unknown, Entered By History     Current Facility-Administered Medications Ordered in Epic   Medication Dose Route Frequency Last Rate Last Dose     [Auto Hold] sodium chloride (PF) 0.9% PF flush 3 mL  3 mL Intracatheter Q1H PRN   3 mL at 01/23/17 1039     [Auto Hold] sodium chloride (PF) 0.9% PF flush 3 mL  3 mL Intracatheter Q8H   3 mL at 01/23/17 0924     0.9% sodium chloride infusion  1,000 mL Intravenous Continuous   Stopped at 01/23/17 1119     niCARdipine 40 mg in 200 mL 0.9% NaCl (CARDENE) infusion  2.5-15 mg/hr Intravenous Continuous   Stopped at 01/23/17 1120     [Auto Hold] levETIRAcetam (KEPPRA) 500 mg in NaCl 0.9 % 100 mL intermittent infusion  500  mg Intravenous Q12H   Stopped at 01/23/17 1045     heparin (porcine) 1000 UNIT/ML injection             heparin (porcine) 1000 UNIT/ML injection             No current [facility]-ordered outpatient prescriptions on file.     Wt Readings from Last 1 Encounters:   01/23/17 104.327 kg (230 lb)     Temp Readings from Last 1 Encounters:   01/23/17 36.7  C (98.1  F) Oral     BP Readings from Last 6 Encounters:   01/23/17 123/74     Pulse Readings from Last 4 Encounters:   01/23/17 76     Resp Readings from Last 1 Encounters:   01/23/17 16     SpO2 Readings from Last 1 Encounters:   01/23/17 97%     Recent Labs   Lab Test  01/23/17   0915   NA  140   POTASSIUM  3.5   CHLORIDE  105   CO2  29   ANIONGAP  6   GLC  124*   BUN  12   CR  0.68   RADHA  8.6     Recent Labs   Lab Test  01/23/17   0915   WBC  6.3   HGB  14.4   PLT  180     No results for input(s): INR in the last 81089 hours.    Invalid input(s): APTT   RECENT LABS:   ECG:   ECHO:   CXR:

## 2017-01-23 NOTE — PHARMACY-ADMISSION MEDICATION HISTORY
Admission medication history interview status for the 1/23/2017  admission is complete. See EPIC admission navigator for prior to admission medications     Medication history source reliability:Good    Actions taken by pharmacist (provider contacted, etc):None     Additional medication history information not noted on PTA med list :None    Medication reconciliation/reorder completed by provider prior to medication history? No    Time spent in this activity: 10 min    Prior to Admission medications    Medication Sig Last Dose Taking? Auth Provider   MOTRIN IB PO Take 600 mg by mouth 4 times daily as needed for moderate pain 1/23/2017 at 0800 Yes Unknown, Entered By History   multivitamin, therapeutic with minerals (THERA-VIT-M) TABS tablet Take 1 tablet by mouth daily Past Week at Unknown time Yes Unknown, Entered By History   VITAMIN D, CHOLECALCIFEROL, PO Take by mouth daily OTC Past Week at Unknown time Yes Unknown, Entered By History

## 2017-01-23 NOTE — ANESTHESIA CARE TRANSFER NOTE
Patient: Malvin Au    ANESTHESIA OUT OF OR MRI (N/A Update)  Additional InformationProcedure(s):  ANESTHESIA NEEDED FOR CEREBRAL COILING    Diagnosis: UNKNOWN  Diagnosis Additional Information: No value filed.    Anesthesia Type:   General, ETT     Note:  Airway :Nasal Cannula  Patient transferred to:PACU  Comments: Drowsy, responding appropriately      Vitals: (Last set prior to Anesthesia Care Transfer)              Electronically Signed By: PRAKASH Dale CRNA  January 23, 2017  3:38 PM

## 2017-01-23 NOTE — IP AVS SNAPSHOT
MRN:[id number]                      After Visit Summary   1/23/2017    Malvin Au    MRN: [id number]           Thank you!     Thank you for choosing [facility] for your care. Our goal is always to provide you with excellent care. Hearing back from our patients is one way we can continue to improve our services. Please take a few minutes to complete the written survey that you may receive in the mail after you visit with us. Thank you!        Patient Information     Date Of Birth          1975        About your hospital stay     You were admitted on:  January 24, 2017 You last received care in the:  [facility] [address] [facility]    You were discharged on:  February 1, 2017        Reason for your hospital stay       This is a 41 year old male admitted with a ruptured cerebral aneurysm.                  Who to Call     For medical emergencies, please call 911.  For non-urgent questions about your medical care, please call your primary care provider or clinic, [phone]  For questions related to your surgery, please call your surgery clinic        Attending Provider     Provider    Trierweiler, Chad A, MD Gjevre, Demetrius REHMAN MD       Primary Care Provider Office Phone # Fax #    Mariya Tylor Cramer [phone]       [place] [address]        After Care Instructions     Activity       Your activity upon discharge: activity as tolerated            Diet       Follow this diet upon discharge:   Combination Diet Gluten Free Diet; Thin Liquids (water, ice chips, juice, milk, gelatin, ice cream, etc)            Discharge Instructions       No driving until follow-up with Neurology in 2 weeks.                  Follow-up Appointments     Follow-up and recommended labs and tests       Follow up with primary care provider within 7 days.  The following labs/tests are recommended: BMP.  Follow up with Neurology as  directed in 2 weeks.  [facility]  [phone]  [address], [address]  [place] [address]                  Your next 10 appointments already scheduled     Feb 03, 2017 10:00 AM   LAB with BA LAB ONLY   [facility]    [address] [place] [address]   [phone]           Patient must bring picture ID.  Patient should be prepared to give a urine specimen  Please do not eat 10-12 hours before your appointment if you are coming in fasting for labs on lipids, cholesterol, or glucose (sugar).  Pregnant women should follow their Care Team instructions. Water with medications is okay. Do not drink coffee or other fluids.   If you have concerns about taking  your medications, please ask at office or if scheduling via [email or web address], send a message by clicking on Secure Messaging, Message Your Care Team.            Feb 06, 2017 10:40 AM   Office Visit with Khadijah Willard MD   [facility]    [address] [place] [address]   [phone]           Bring a current list of meds and any records pertaining to this visit.  For Physicals, please bring immunization records and any forms needing to be filled out.  Please arrive 10 minutes early to complete paperwork.            Feb 13, 2017  4:40 PM   Office Visit with Mariya Cramer MD   [facility] ([facility])    [address] [place] [address]   [phone]           Bring a current list of meds and any records pertaining to this visit.  For Physicals, please bring immunization records and any forms needing to be filled out.  Please arrive 10 minutes early to complete paperwork.              Additional Services     Occupational Therapy Referral       *This therapy referral will be filtered to a centralized scheduling office at [facility]  and the patient will receive a call to schedule an appointment at a [place] location most convenient for them. *     [facility] provides Occupational Therapy evaluation and treatment and many specialty services across the [facility].  If requesting a specialty program, please choose from the list below.    If you have not heard from the scheduling office within 2 business days, please call [phone] for all locations, with the exception of [place], please call [phone].     Treatment: Evaluation & Treatment  Special Instructions/Modalities: Cognitive Intervention  Special Programs: None    Please be aware that coverage of these services is subject to the terms and limitations of your health insurance plan.  Call member services at your health plan with any benefit or coverage questions.      **Note to Provider:  If you are referring outside of [place] for the therapy appointment, please list the name of the location in the  special instructions  above, print the referral and give to the patient to schedule the appointment.            Physical Therapy Referral       *This therapy referral will be filtered to a centralized scheduling office at [facility] and the patient will receive a call to schedule an appointment at a [place] location most convenient for them. *     [facility] provides Physical Therapy evaluation and treatment and many specialty services across the [facility].  If requesting a specialty program, please choose from the list below.    If you have not heard from the scheduling office within 2 business days, please call [phone] for all locations, with the exception of [place], please call [phone].  Treatment: Evaluation & Treatment  Special Instructions/Modalities: Balance and Gait  Special Programs: None    Please be aware that coverage of these services is subject to the terms and limitations of your health  "insurance plan.  Call member services at your health plan with any benefit or coverage questions.      **Note to Provider:  If you are referring outside of [facility] for the therapy appointment, please list the name of the location in the  special instructions  above, print the referral and give to the patient to schedule the appointment.                  Future tests that were ordered for you     Sodium       On Feb 3, 2017  Fax results to Dr. Chang's clinic                  Pending Results     No orders found from 2017 to 2017.            Statement of Approval     Ordered          17 0942  I have reviewed and agree with all the recommendations and orders detailed in this document.   EFFECTIVE NOW     Approved and electronically signed by:  Jacinto Lockhart MD             Admission Information        Provider Department Dept Phone    2017 Demetrius Farah MD, MD [facility] [phone]      Your Vitals Were     Blood Pressure Pulse Temperature    143/97 mmHg 66 98.1  F (36.7  C) (Oral)    Respirations Height Weight    16 1.829 m (6') 101.2 kg (223 lb 1.7 oz)    BMI (Body Mass Index) Pulse Oximetry       30.25 kg/m2 95%       MyChart Information     [employer] lets you send messages to your doctor, view your test results, renew your prescriptions, schedule appointments and more. To sign up, go to [email or web address] . Click on \"Log in\" on the left side of the screen, which will take you to the Welcome page. Then click on \"Sign up Now\" on the right side of the page.     You will be asked to enter the access code listed below, as well as some personal information. Please follow the directions to create your username and password.     Your access code is: [email or web address]  Expires: 2017  1:52 PM     Your access code will  in 90 days. If you need help or a new code, please call your [facility] clinic or [phone].        Care EveryWhere ID     This is your Care EveryWhere ID. " This could be used by other organizations to access your [facility] medical records  [id number]           Review of your medicines      START taking        Dose / Directions    cyanocobalamin 500 MCG Subl sublingual tablet   Used for:  Subarachnoid hemorrhage (H)        Dose:  1000 mcg   Place 2 tablets (1,000 mcg) under the tongue daily   Quantity:  150 tablet   Refills:  0       levETIRAcetam 500 MG tablet   Commonly known as:  KEPPRA   Used for:  Convulsions, unspecified convulsion type (H)        Dose:  500 mg   Start taking on:  2/2/2017   Take 1 tablet (500 mg) by mouth daily   Quantity:  2 tablet   Refills:  0       lisinopril 5 MG tablet   Commonly known as:  PRINIVIL/ZESTRIL   Used for:  Malignant essential hypertension        Dose:  5 mg   Take 1 tablet (5 mg) by mouth daily   Quantity:  30 tablet   Refills:  0       niMODipine 30 MG capsule   Commonly known as:  NIMOTOP   Used for:  Subarachnoid hemorrhage (H)        Dose:  60 mg   Take 2 capsules (60 mg) by mouth every 4 hours for 19 days   Quantity:  228 capsule   Refills:  0       sodium chloride 1 GM tablet   Used for:  Hyponatremia, Subarachnoid hemorrhage (H)        Dose:  1 g   Take 1 tablet (1 g) by mouth 5 times daily   Quantity:  150 tablet   Refills:  0       traMADol 50 MG tablet   Commonly known as:  ULTRAM   Used for:  Subarachnoid hemorrhage (H)        Dose:  50 mg   Take 1 tablet (50 mg) by mouth every 6 hours as needed   Quantity:  40 tablet   Refills:  0         CONTINUE these medicines which have NOT CHANGED        Dose / Directions    multivitamin, therapeutic with minerals Tabs tablet   Notes to Patient:  Not taken in hospital  Resume home schedule          Dose:  1 tablet   Take 1 tablet by mouth daily   Refills:  0       VITAMIN D (CHOLECALCIFEROL) PO   Notes to Patient:  Not taken in hospital  Resume home schedule          Take by mouth daily OTC   Refills:  0         STOP taking     MOTRIN IB PO                Where to get your  medicines      These medications were sent to [facility] [place] - [address]  [address] [place] [address]     Phone:  [phone]    - cyanocobalamin 500 MCG Subl sublingual tablet  - levETIRAcetam 500 MG tablet  - lisinopril 5 MG tablet  - niMODipine 30 MG capsule  - sodium chloride 1 GM tablet      Some of these will need a paper prescription and others can be bought over the counter. Ask your nurse if you have questions.     Bring a paper prescription for each of these medications    - traMADol 50 MG tablet             Protect others around you: Learn how to safely use, store and throw away your medicines at www.disposemymeds.org.             Medication List: This is a list of all your medications and when to take them. Check marks below indicate your daily home schedule. Keep this list as a reference.      Medications           Morning Afternoon Evening Bedtime As Needed    cyanocobalamin 500 MCG Subl sublingual tablet   Place 2 tablets (1,000 mcg) under the tongue daily   Last time this was given:  1,000 mcg on 2/1/2017  8:08 AM   Next Dose Due:  2/2/17 am                                levETIRAcetam 500 MG tablet   Commonly known as:  KEPPRA   Take 1 tablet (500 mg) by mouth daily   Start taking on:  2/2/2017   Last time this was given:  500 mg on 2/1/2017  8:08 AM   Next Dose Due:  2/2/17 am                                lisinopril 5 MG tablet   Commonly known as:  PRINIVIL/ZESTRIL   Take 1 tablet (5 mg) by mouth daily   Last time this was given:  5 mg on 2/1/2017  8:09 AM   Next Dose Due:  2/2/17 am                                multivitamin, therapeutic with minerals Tabs tablet   Take 1 tablet by mouth daily   Notes to Patient:  Not taken in hospital  Resume home schedule                                  niMODipine 30 MG capsule   Commonly known as:  NIMOTOP   Take 2 capsules (60 mg) by mouth every 4 hours for 19 days   Last time this was given:  60 mg on  2/1/2017 12:10 PM                                sodium chloride 1 GM tablet   Take 1 tablet (1 g) by mouth 5 times daily   Last time this was given:  1 g on 2/1/2017 10:03 AM                                traMADol 50 MG tablet   Commonly known as:  ULTRAM   Take 1 tablet (50 mg) by mouth every 6 hours as needed   Last time this was given:  50 mg on 2/1/2017 10:03 AM                                VITAMIN D (CHOLECALCIFEROL) PO   Take by mouth daily OTC   Notes to Patient:  Not taken in hospital  Resume home schedule                                            More Information        What is a Subarachnoid Hemorrhage (Stroke)?  A stroke is the interruption of blood flow to part of the brain. A subarachnoid hemorrhage is a type of stroke. A blood vessel on the surface of the brain bursts (hemorrhages). This spills blood into the surrounding tissue. This type of stroke often happens suddenly, with little warning. It is the most serious of all types of strokes.     During a subarachnoid hemorrhage  This type of stroke happens when a major blood vessel bursts on the surface of the brain. Normally, the space between the brain and the skull is filled with a clear fluid. When the blood vessel bursts, blood flows into the space between the brain and the skull. The amount of fluid in this space increases and puts pressure on the brain. This pressure can damage nearby parts of the brain.  Most of these strokes happen when a cerebral aneurysm bursts. An aneurysm is a weak spot in the wall of a blood vessel. A bubble often forms in this weak spot. In some cases, a cerebral aneurysm causes pain or other symptoms. In most cases, though, an aneurysm causes no symptoms until it bursts.  Symptoms of a subarachnoid hemorrhage  Any stroke is a medical emergency. If you have any of the following symptoms, even if they seem to get better, call 911 right away:    Sudden, excruciating headache with no known cause    Nausea and vomiting  (often with headache)    Sudden confusion or decrease in alertness    Trouble moving or loss of feeling, especially on the face, arm, or leg specifically on 1 side of the body    Sudden trouble walking, dizziness, loss of balance or coordination    Sudden trouble talking or understanding speech      Sudden mood changes    Sudden dimness, double vision, or loss of vision, particularly in 1 eye    Eyes suddenly very sensitive to light    Neck and shoulder pain or stiff neck    Seizure  Treating hemorrhagic stroke  The acute phase lasts from the first minutes to hours after symptoms begin. During this phase, treatment focuses on relieving pressure on the brain and preventing further damage. A procedure or surgery may be done to repair the ruptured (burst) aneurysm. A drain may be placed into the brain to relieve pressure. Medicines to control blood pressure may be given through an IV line. Seizure medicine is often given. Tests will likely be done to check for other aneurysms in the brain. If they are found, surgery may be done to reduce the risk that they will burst and bleed.  After the acute phase, treatment focuses on recovery. Long-term damage from a stroke can include paralysis, trouble speaking or understanding, and trouble thinking clearly. Rehabilitation therapy (rehab) can help reduce these effects and regain skills. Rehab begins in the hospital and generally continues in an inpatient or outpatient facility, and eventually at home. It will focus on regaining lost skills. It may include:    Help regaining movement.    Therapy for speech and language    Help with swallowing    Help reducing risk factors for another stroke, such as smoking  In addition, medicines that help prevent another stroke may be given. These include medicines to control blood pressure and prevent bleeding.    9965-7313 [employer]. [address] [place] [address], [place] [address]. All rights reserved. This information is not  intended as a substitute for professional medical care. Always follow your healthcare professional's instructions.                Hyponatremia  Hyponatremia means low sodium levels in the blood. This condition most often occurs after prolonged vomiting or diarrhea, which causes your body to lose too much water and sodium. It can also result from drinking excess amounts of water or the use of diuretics (water pills).  Mild hyponatremia causes no symptoms. It is only discovered with a blood test. As sodium levels in the blood decreases, symptoms begin to appear. This includes weakness, confusion, muscle cramping and seizures.  Home care    Reduce your daily water intake until the problem is corrected.    If you have been taking diuretics, you may be asked to stop taking them for a short time.    If you are having symptoms of weakness or confusion, do not drive or operate dangerous machinery until symptoms resolve.  Follow-up care  Follow up with your healthcare provider for a repeat blood test within the next week or as advised by our staff.  When to seek medical advice  Call your healthcare provider if any of the following occur:    Increasing weakness    Dizziness    Irregular heartbeat, extra beats or very fast heart rate    Increasing confusion    Fainting or loss of consciousness    7165-8474 [employer]. [address] [place] [address]. All rights reserved. This information is not intended as a substitute for professional medical care. Always follow your healthcare professional's instructions.                Patient Education    Levetiracetam Oral solution    Levetiracetam Oral tablet    Levetiracetam Oral tablet, extended-release    Levetiracetam Solution for injection  Levetiracetam Oral tablet  What is this medicine?  LEVETIRACETAM (linda huang) is an antiepileptic drug. It is used with other medicines to treat certain types of seizures.  This medicine may be used for other  purposes; ask your health care provider or pharmacist if you have questions.  What should I tell my health care provider before I take this medicine?  They need to know if you have any of these conditions:    kidney disease    suicidal thoughts, plans, or attempt; a previous suicide attempt by you or a family member    an unusual or allergic reaction to levetiracetam, other medicines, foods, dyes, or preservatives    pregnant or trying to get pregnant    breast-feeding  How should I use this medicine?  Take this medicine by mouth with a glass of water. Follow the directions on the prescription label. Swallow the tablets whole. Do not crush or chew this medicine. You may take this medicine with or without food. Take your doses at regular intervals. Do not take your medicine more often than directed. Do not stop taking this medicine or any of your seizure medicines unless instructed by your doctor or health care professional. Stopping your medicine suddenly can increase your seizures or their severity.  A special MedGuide will be given to you by the pharmacist with each prescription and refill. Be sure to read this information carefully each time.  Contact your pediatrician or health care professional regarding the use of this medication in children. While this drug may be prescribed for children as young as 4 years of age for selected conditions, precautions do apply.  Overdosage: If you think you have taken too much of this medicine contact a poison control center or emergency room at once.  NOTE: This medicine is only for you. Do not share this medicine with others.  What if I miss a dose?  If you miss a dose, take it as soon as you can. If it is almost time for your next dose, take only that dose. Do not take double or extra doses.  What may interact with this medicine?  This medicine may interact with the following medications:    carbamazepine    colesevelam    probenecid    sevelamer  This list may not describe  all possible interactions. Give your health care provider a list of all the medicines, herbs, non-prescription drugs, or dietary supplements you use. Also tell them if you smoke, drink alcohol, or use illegal drugs. Some items may interact with your medicine.  What should I watch for while using this medicine?  Visit your doctor or health care professional for a regular check on your progress. Wear a medical identification bracelet or chain to say you have epilepsy, and carry a card that lists all your medications.  It is important to take this medicine exactly as instructed by your health care professional. When first starting treatment, your dose may need to be adjusted. It may take weeks or months before your dose is stable. You should contact your doctor or health care professional if your seizures get worse or if you have any new types of seizures.  You may get drowsy or dizzy. Do not drive, use machinery, or do anything that needs mental alertness until you know how this medicine affects you. Do not stand or sit up quickly, especially if you are an older patient. This reduces the risk of dizzy or fainting spells. Alcohol may interfere with the effect of this medicine. Avoid alcoholic drinks.  The use of this medicine may increase the chance of suicidal thoughts or actions. Pay special attention to how you are responding while on this medicine. Any worsening of mood, or thoughts of suicide or dying should be reported to your health care professional right away.  Women who become pregnant while using this medicine may enroll in the [place] American Antiepileptic Drug Pregnancy Registry by calling 1-788.207.6440. This registry collects information about the safety of antiepileptic drug use during pregnancy.  What side effects may I notice from receiving this medicine?  Side effects you should report to your doctor or health care professional as soon as possible:    allergic reactions like skin rash, itching or  hives, swelling of the face, lips, or tongue    breathing problems    dark urine    general ill feeling or flu-like symptoms    problems with balance, talking, walking    unusually weak or tired    worsening of mood, thoughts or actions of suicide or dying    yellowing of the eyes or skin  Side effects that usually do not require medical attention (report to your doctor or health care professional if they continue or are bothersome):    diarrhea    dizzy, drowsy    headache    loss of appetite  This list may not describe all possible side effects. Call your doctor for medical advice about side effects. You may report side effects to FDA at 7-789-XXD-7222.  Where should I keep my medicine?  Keep out of reach of children.  Store at room temperature between 15 and 30 degrees C (59 and 86 degrees F). Throw away any unused medicine after the expiration date.  NOTE:This sheet is a summary. It may not cover all possible information. If you have questions about this medicine, talk to your doctor, pharmacist, or health care provider. Copyright  2016 [employer]                Patient Education    Tramadol Hydrochloride Oral capsule, biphasic release    Tramadol Hydrochloride Oral disintegrating tablet    Tramadol Hydrochloride Oral tablet    Tramadol Hydrochloride Oral tablet, extended-release  Tramadol Hydrochloride Oral tablet  What is this medicine?  TRAMADOL (TRA ma dole) is a pain reliever. It is used to treat moderate to severe pain in adults.  This medicine may be used for other purposes; ask your health care provider or pharmacist if you have questions.  What should I tell my health care provider before I take this medicine?  They need to know if you have any of these conditions:    brain tumor    depression    drug abuse or addiction    head injury    if you frequently drink alcohol containing drinks    kidney disease or trouble passing urine    liver disease    lung disease, asthma, or breathing  problems    seizures or epilepsy    suicidal thoughts, plans, or attempt; a previous suicide attempt by you or a family member    an unusual or allergic reaction to tramadol, codeine, other medicines, foods, dyes, or preservatives    pregnant or trying to get pregnant    breast-feeding  How should I use this medicine?  Take this medicine by mouth with a full glass of water. Follow the directions on the prescription label. If the medicine upsets your stomach, take it with food or milk. Do not take more medicine than you are told to take.  Talk to your pediatrician regarding the use of this medicine in children. Special care may be needed.  Overdosage: If you think you have taken too much of this medicine contact a poison control center or emergency room at once.  NOTE: This medicine is only for you. Do not share this medicine with others.  What if I miss a dose?  If you miss a dose, take it as soon as you can. If it is almost time for your next dose, take only that dose. Do not take double or extra doses.  What may interact with this medicine?  Do not take this medicine with any of the following medications:    MAOIs like Carbex, Eldepryl, Marplan, Nardil, and Parnate  This medicine may also interact with the following medications:    alcohol or medicines that contain alcohol    antihistamines    benzodiazepines    bupropion    carbamazepine or oxcarbazepine    clozapine    cyclobenzaprine    digoxin    furazolidone    linezolid    medicines for depression, anxiety, or psychotic disturbances    medicines for migraine headache like almotriptan, eletriptan, frovatriptan, naratriptan, rizatriptan, sumatriptan, zolmitriptan    medicines for pain like pentazocine, buprenorphine, butorphanol, meperidine, nalbuphine, and propoxyphene    medicines for sleep    muscle relaxants    naltrexone    phenobarbital    phenothiazines like perphenazine, thioridazine, chlorpromazine, mesoridazine, fluphenazine, prochlorperazine,  promazine, and trifluoperazine    procarbazine    warfarin  This list may not describe all possible interactions. Give your health care provider a list of all the medicines, herbs, non-prescription drugs, or dietary supplements you use. Also tell them if you smoke, drink alcohol, or use illegal drugs. Some items may interact with your medicine.  What should I watch for while using this medicine?  Tell your doctor or health care professional if your pain does not go away, if it gets worse, or if you have new or a different type of pain. You may develop tolerance to the medicine. Tolerance means that you will need a higher dose of the medicine for pain relief. Tolerance is normal and is expected if you take this medicine for a long time.  Do not suddenly stop taking your medicine because you may develop a severe reaction. Your body becomes used to the medicine. This does NOT mean you are addicted. Addiction is a behavior related to getting and using a drug for a non-medical reason. If you have pain, you have a medical reason to take pain medicine. Your doctor will tell you how much medicine to take. If your doctor wants you to stop the medicine, the dose will be slowly lowered over time to avoid any side effects.  You may get drowsy or dizzy. Do not drive, use machinery, or do anything that needs mental alertness until you know how this medicine affects you. Do not stand or sit up quickly, especially if you are an older patient. This reduces the risk of dizzy or fainting spells. Alcohol can increase or decrease the effects of this medicine. Avoid alcoholic drinks.  You may have constipation. Try to have a bowel movement at least every 2 to 3 days. If you do not have a bowel movement for 3 days, call your doctor or health care professional.   Your mouth may get dry. Chewing sugarless gum or sucking hard candy, and drinking plenty of water may help. Contact your doctor if the problem does not go away or is severe.  What  side effects may I notice from receiving this medicine?  Side effects that you should report to your doctor or health care professional as soon as possible:    allergic reactions like skin rash, itching or hives, swelling of the face, lips, or tongue    breathing difficulties, wheezing    confusion    itching    light headedness or fainting spells    redness, blistering, peeling or loosening of the skin, including inside the mouth    seizures  Side effects that usually do not require medical attention (report to your doctor or health care professional if they continue or are bothersome):    constipation    dizziness    drowsiness    headache    nausea, vomiting  This list may not describe all possible side effects. Call your doctor for medical advice about side effects. You may report side effects to FDA at 1-431-PZN-6746.  Where should I keep my medicine?  Keep out of the reach of children.  Store at room temperature between 15 and 30 degrees C (59 and 86 degrees F). Keep container tightly closed. Throw away any unused medicine after the expiration date.  NOTE:This sheet is a summary. It may not cover all possible information. If you have questions about this medicine, talk to your doctor, pharmacist, or health care provider. Copyright  2016 [employer]                Patient Education    Nimodipine Oral capsule, liquid filled    Nimodipine Oral solution  Nimodipine Oral capsule, liquid filled  What is this medicine?  NIMODIPINE (opalvineet gonzalez) is a calcium-channel blocker. This medicine is used to treat subarachnoid hemorrhage. This is a condition in which there is bleeding into the space around the brain that causes severe headaches and stiff neck.  This medicine may be used for other purposes; ask your health care provider or pharmacist if you have questions.  What should I tell my health care provider before I take this medicine?  They need to know if you have any of these conditions:    liver disease    an  unusual or allergic reaction to nimodipine, other medicines, foods, dyes, or preservatives    pregnant or trying to get pregnant    breast-feeding  How should I use this medicine?  Take this medicine by mouth with a glass of water. Follow the directions on the prescription label. It is best to take this medicine at least one hour before or two hours after meals. Take your doses at regular intervals. Do not take your medicine more often than directed. Continue to take your medicine even if you feel better. Do not stop taking except on the advice of your doctor or health care professional.  Talk to your pediatrician regarding the use of this medicine in children. Special care may be needed.  Overdosage: If you think you have taken too much of this medicine contact a poison control center or emergency room at once.  NOTE: This medicine is only for you. Do not share this medicine with others.  What if I miss a dose?  If you miss a dose, take it as soon as you can. If it is almost time for your next dose, take only that dose, Do not take double or extra doses.  What may interact with this medicine?  This medicine may interact with the following medications:    amiodarone    birth control pills    certain antibiotics like clarithromycin, erythromycin, and telithromycin    certain medicines for depression, anxiety, or psychotic disturbances    certain medicines for fungal infections like fluconazole, ketoconazole, itraconazole, posaconazole, and voriconazole    certain medicines for hepatitis C infection like boceprevir and telaprevir    certain medicines for HIV like amprenavir, atazanavir, delavirdine, indinavir, nelfinavir, ritonavir, and saquinavir    certain medicines for seizures like carbamazepine, phenobarbital, phenytoin, and valproic acid    cimetidine    conivaptan    grapefruit juice    medicines for high blood pressure    rifampin    Sedillo's Wort  This list may not describe all possible interactions. Give  your health care provider a list of all the medicines, herbs, non-prescription drugs, or dietary supplements you use. Also tell them if you smoke, drink alcohol, or use illegal drugs. Some items may interact with your medicine.  What should I watch for while using this medicine?  Visit your doctor or health care professional for regular checks on your progress.  Alcohol can increase the chance of getting low blood pressure. Avoid alcoholic drinks while you are taking this medicine.  What side effects may I notice from receiving this medicine?  Side effects that you should report to your doctor or health care professional as soon as possible:    difficulty breathing    dizziness or drowsiness    irregular or fast heartbeats (palpitations)    lightheadedness or fainting    slow heartbeat    swelling of the legs or ankles    unusual bleeding or bruising, red spots on skin    unusually weak or tired  Side effects that usually do not require medical attention (report to your doctor or health care professional if they continue or are bothersome):    flushing    headache    nausea    sweating  This list may not describe all possible side effects. Call your doctor for medical advice about side effects. You may report side effects to FDA at 0-455-FDA-2804.  Where should I keep my medicine?  Keep out of reach of children.  Store at room temperature between 15 and 30 degrees C (59 and 86 degrees F). Do not freeze. Protect from light. Keep foil packaged capsules in their original foil until needed. Throw away any unused medicine after the expiration date.  NOTE:This sheet is a summary. It may not cover all possible information. If you have questions about this medicine, talk to your doctor, pharmacist, or health care provider. Copyright  2016 [employer] Standard                Lisinopril Oral tablet  What is this medicine?  LISINOPRIL (lyse IN oh pril) is an ACE inhibitor. This medicine is used to treat high blood pressure and heart  failure. It is also used to protect the heart immediately after a heart attack.  This medicine may be used for other purposes; ask your health care provider or pharmacist if you have questions.  What should I tell my health care provider before I take this medicine?  They need to know if you have any of these conditions:    diabetes    heart or blood vessel disease    immune system disease like lupus or scleroderma    kidney disease    low blood pressure    previous swelling of the tongue, face, or lips with difficulty breathing, difficulty swallowing, hoarseness, or tightening of the throat    an unusual or allergic reaction to lisinopril, other ACE inhibitors, insect venom, foods, dyes, or preservatives    pregnant or trying to get pregnant    breast-feeding  How should I use this medicine?  Take this medicine by mouth with a glass of water. Follow the directions on your prescription label. You may take this medicine with or without food. Take your medicine at regular intervals. Do not stop taking this medicine except on the advice of your doctor or health care professional.  Talk to your pediatrician regarding the use of this medicine in children. Special care may be needed. While this drug may be prescribed for children as young as 6 years of age for selected conditions, precautions do apply.  Overdosage: If you think you have taken too much of this medicine contact a poison control center or emergency room at once.  NOTE: This medicine is only for you. Do not share this medicine with others.  What if I miss a dose?  If you miss a dose, take it as soon as you can. If it is almost time for your next dose, take only that dose. Do not take double or extra doses.  What may interact with this medicine?    diuretics    lithium    NSAIDs, medicines for pain and inflammation, like ibuprofen or naproxen    over-the-counter herbal supplements like hawthorn    potassium salts or potassium supplements    salt  substitutes  This list may not describe all possible interactions. Give your health care provider a list of all the medicines, herbs, non-prescription drugs, or dietary supplements you use. Also tell them if you smoke, drink alcohol, or use illegal drugs. Some items may interact with your medicine.  What should I watch for while using this medicine?  Visit your doctor or health care professional for regular check ups. Check your blood pressure as directed. Ask your doctor what your blood pressure should be, and when you should contact him or her. Call your doctor or health care professional if you notice an irregular or fast heart beat.  Women should inform their doctor if they wish to become pregnant or think they might be pregnant. There is a potential for serious side effects to an unborn child. Talk to your health care professional or pharmacist for more information.  Check with your doctor or health care professional if you get an attack of severe diarrhea, nausea and vomiting, or if you sweat a lot. The loss of too much body fluid can make it dangerous for you to take this medicine.  You may get drowsy or dizzy. Do not drive, use machinery, or do anything that needs mental alertness until you know how this drug affects you. Do not stand or sit up quickly, especially if you are an older patient. This reduces the risk of dizzy or fainting spells. Alcohol can make you more drowsy and dizzy. Avoid alcoholic drinks.  Avoid salt substitutes unless you are told otherwise by your doctor or health care professional.  Do not treat yourself for coughs, colds, or pain while you are taking this medicine without asking your doctor or health care professional for advice. Some ingredients may increase your blood pressure.  What side effects may I notice from receiving this medicine?  Side effects that you should report to your doctor or health care professional as soon as possible:    abdominal pain with or without nausea or  vomiting    allergic reactions like skin rash or hives, swelling of the hands, feet, face, lips, throat, or tongue    dark urine    difficulty breathing    dizzy, lightheaded or fainting spell    fever or sore throat    irregular heart beat, chest pain    pain or difficulty passing urine    redness, blistering, peeling or loosening of the skin, including inside the mouth    unusually weak    yellowing of the eyes or skin  Side effects that usually do not require medical attention (report to your doctor or health care professional if they continue or are bothersome):    change in taste    cough    decreased sexual function or desire    headache    sun sensitivity    tiredness  This list may not describe all possible side effects. Call your doctor for medical advice about side effects. You may report side effects to FDA at 6-377-UHY-4242.  Where should I keep my medicine?  Keep out of the reach of children.  Store at room temperature between 15 and 30 degrees C (59 and 86 degrees F). Protect from moisture. Keep container tightly closed. Throw away any unused medicine after the expiration date.  NOTE:This sheet is a summary. It may not cover all possible information. If you have questions about this medicine, talk to your doctor, pharmacist, or health care provider. Copyright  2016 [employer] Standard                Sodium Chloride Oral solution  What is this medicine?  SODIUM CHLORIDE (CHERY katy um KLOOR rosa) is a natural salt that is important for normal body functions. Too much or too little sodium in the body can cause health problems. This medicine is used to replace sodium in the body after excessive sweating in order to prevent and treat heat cramps. It is also used to replace sodium when replacement is needed for other medical reasons.  This medicine may be used for other purposes; ask your health care provider or pharmacist if you have questions.  What should I tell my health care provider before I take this  medicine?  They need to know if you have any of these conditions:    heart disease    high blood pressure    high levels of sodium in the blood    kidney disease    liver disease    low salt or sodium diet    an unusual or allergic reaction to any medicines, foods, dyes, or preservatives    pregnant or trying to get pregnant    breast-feeding  How should I use this medicine?  Take this medicine by mouth. Follow the directions on the prescription label. The tablet is dissolved in water to make a solution that can be swallowed. If dissolving the tablet, mix with 4 ounces (120 ml) of distilled water and use exactly as directed. Do not take your medicine more often than directed.  Talk to your pediatrician regarding the use of this medicine in children. Special care may be needed.  Overdosage: If you think you've taken too much of this medicine contact a poison control center or emergency room at once.  NOTE: This medicine is only for you. Do not share this medicine with others.  What if I miss a dose?  This medicine is not for regular use. Take only those doses prescribed by your doctor or health care professional. Do not take double or extra doses.  What may interact with this medicine?    certain medicines for high blood pressure    lithium  This list may not describe all possible interactions. Give your health care provider a list of all the medicines, herbs, non-prescription drugs, or dietary supplements you use. Also tell them if you smoke, drink alcohol, or use illegal drugs. Some items may interact with your medicine.  What should I watch for while using this medicine?  Stop taking this medicine and tell your doctor or healthcare professional if your symptoms of heat cramps continue  for more than 24 hours or if they get worse. You may have a serious medical condition.  Each tablet contains 394 mg of sodium. Inform your doctor or healthcare professional if you are on a low salt or sodium diet.  What side effects  may I notice from receiving this medicine?  Side effects that you should report to your doctor or health care professional as soon as possible:    allergic reactions like skin rash, itching or hives, swelling of the face, lips, or tongue    confusion    muscle twitching    seizures    swelling of the ankles, feet, hands  Side effects that usually do not require medical attention (Report these to your doctor or health care professional if they continue or are bothersome.):    increased thirst  This list may not describe all possible side effects. Call your doctor for medical advice about side effects. You may report side effects to FDA at 4-772-NFW-2270.  Where should I keep my medicine?  Keep out of the reach of children.  Store at room temperature between 15 and 30 degrees C (59 and 86 degrees F). Keep container tightly closed. Throw away any unused medicine after the expiration date.  NOTE: This sheet is a summary. It may not cover all possible information. If you have questions about this medicine, talk to your doctor, pharmacist, or health care provider.  NOTE:This sheet is a summary. It may not cover all possible information. If you have questions about this medicine, talk to your doctor, pharmacist, or health care provider. Copyright  2016 [employer] Standard

## 2017-01-23 NOTE — OR NURSING
Patient arrived monitored from ER - patient alert and responding appropriately, conversational, following commands. does c/o headache - b/p with Nicardipine 10mg iv infusing, artline b/p 122/61. Wife and brother-in-law bedside - patient has 2-#18 PIV and 1-#16 PIV.   Wife given patient ring and family has all other belongings.

## 2017-01-23 NOTE — PROCEDURES
Procedures      INSERT ARTERIAL LINE [IVT1 (Custom)]          Expand All Collapse All    Procedure: Arterial line placement    Consent: Emergency placement for urgent BP control.      Location: Arterial line placement into the right radial artery.    Universal Protocol: Patient Identification was verified, time out was performed, site marking N/A, Imaging data N/A. Full Barrier precaution done: Hands washed, mask, gloves, gown, cap and eye protection all used.    Indication: Monitoring of BP and for ABG blood draw in a hemodynamically unstable patient.    Narrative: Area prepped with chlorhexedine and draped in sterile fashion. 1% lidocaine injected subcutaneously for local anesthesia. Arterial catheter inserted using Seldinger's technique and sutured to the skin. Distal blood flow (skin color and temperature) preserved and good arterial wave form documented.        Complications: No apparent complication    Procedure performed by Jeff Chang MD, PhD, [facility] [place] [facility] on January 23, 2017

## 2017-01-23 NOTE — PROGRESS NOTES
Interventional Neuroradiology Pre Procedure Assessment    We are asked to perform a cerebral angiogram and possible embolization on this 41 year old male   HPI:  History of a headache for about few weeks. 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  He was brought to the ER by EMS.  He has regained consciousness and does not remember the events prior to him being found.  CT suggests extensive SAH suspicious for aneurysm     No Known Allergies  Labs: Hgb 14.4, plt 180 creat 0.68    CT: IMPRESSION:  1. Extensive subarachnoid hemorrhage in a pattern that suggests  aneurysmal rupture.  2. Mild hydrocephalus.  3. No evidence for ischemic infarct.    Exam:   /85 mmHg  Temp(Src) 98.1  F (36.7  C) (Oral)  Resp 7  Ht 1.829 m (6')  SpO2 97%  Neuro: Currently alert and oriented.  Follows commands, MCKENZIE strength equal.  Does c/o headache and has received narcotics  Cardiac: S1S2, currently on cardene drip  Lungs: clear      Okay to proceed with planned procedure.  Anesthesia will see patient as well    Procedure its risks, benefits, and alternatives including but not limited to stroke, aneurysm rupture, contrast dye or medication reaction, renal failure, infection, vessel injury were discussed with patient, his wife, and brother in law.  Questions and answered and the patient gives verbal consent to proceed.  Because he has received narcotics written and verbal consent obtained from the patients wife.     Tosin Malave

## 2017-01-23 NOTE — ED PROVIDER NOTES
History     Chief Complaint:  Altered Mental Status    HPI:    Malvin uA is a 41 year old male with a history of hypertension, noncompliant with medications, who presents with an altered mental status. 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 Upon EMS arrival, he was drooling, hypertensive at 213/159, and remained unresponsive despite an aggressive sternum rub; they did not notice any tonic clonic behavior or any obvious trauma. When he finally awoke, he was initially confused with post-ictal behavior, loss of bladder control, and his short-term memory was not intact. Here, he is more oriented and memory is improving. The patient states he does not recall any of the events this morning, but does recall being at home last night watching hockey. When prompted, the patient reports that he has been having constant daily headaches for the past few weeks that slightly improve when treated with Ibuprofen. He denies any pain.     Allergies:  No known drug allergies       Medications:    The patient is currently on no regular medications.      Past Medical History:    Hypertension     Past Surgical History:    History reviewed. No pertinent surgical history.      Family History:    History reviewed. No pertinent family history.       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 Resting comfortably on the bed.     Eye:  Pupils are equal, round, and reactive.  Extraocular movements intact.    ENT:  No rhinorrhea.  Moist mucus membranes.  Normal tongue and tonsil.    Cardiac:  Regular rate and rhythm.  No murmurs, gallops, or rubs.    Pulmonary:  Clear to auscultation bilaterally.  No wheezes, rales, or rhonchi.    Abdomen:  Positive bowel sounds.  Abdomen is soft and non-distended, without focal tenderness.    Musculoskeletal:  Normal movement of all extremities without evidence for deficit.    Skin:  Warm and dry without rashes.    Neurologic:  Cranial nerves II-XII intact. Appropriate strength in all extremities except for the left leg. There is diffuse 4/5 strength with hip flection, knee extension/flexion, and ankle plantar flexion. The patient struggles to bear weight on this leg due to weakness. Sensation is intact throughout.     Psychiatric:  Patient is alert although appears post-ictal. Amnestic to all events today.    Emergency Department Course     ECG (09:15:13):  Rate 80 bpm. KY interval 162. QRS duration 96. QT/QTc 400/461. P-R-T axes 33- -30-53. Normal sinus rhythm. Left axis deviation. Moderate voltage criteria for LVH, may be normal variant. Interpreted at 0911 by Trierweiler, Chad A, MD.     Imaging:  Radiographic findings were communicated with the patient and the admitting MD who voiced understanding of the findings.    CT Head w/o Contrast:  IMPRESSION:  1. Extensive subarachnoid hemorrhage in a pattern that suggests  aneurysmal rupture.  2. Mild hydrocephalus.  3. No evidence for ischemic infarct.  As read by Radiology.     CT Head w/ Contrast:  IMPRESSION: Normal CT perfusion of the brain.    Radiation dose for this scan was reduced using automated exposure  control, adjustment of the mA and/or kV according to patient size, or  iterative reconstruction  technique.  As read by Radiology.     Laboratory:  CBC: WNL (WBC 6.3, HGB 14.4, )    Basal Metabolic Panel: Glucose 124 (H), WNL (Creatinine 0.68)     Interventions:  0909- Zofran 4 mg IV  0924- NS 1L IV Bolus  0945- Nicardipine 40 mg IV  1022- Isovue-370 120 mL IV    Emergency Department Course:  Past medical records, nursing notes, and vitals reviewed.  0907: I performed an exam of the patient and obtained history, as documented above.    IV inserted and blood drawn.     The patient was placed on continuous cardiac monitoring and pulse oximetry.     The patient was sent for a head CT while in the emergency department, findings above.     0928: I discussed the case with Dr. Chang of Neurology regarding the patient.      0939: I discussed the case with Dr. Chang regarding the patient. He relayed his findings of the patient's CT where he found the patient to have an ACCA rupture.    0940: I rechecked the patient. Explained findings to the patient.     Findings and plan explained to the Patient and admitting MD who consents to admission.     1000: Discussed the patient with Dr. Sam, who will admit the patient to a ICU bed for further monitoring, evaluation, and treatment.      1035: I rechecked the patient.     Impression & Plan      CMS Diagnoses: The patient has stroke symptoms:           ED Stroke specific documentation           NIHSS PDF          Protocol PDF     Patient last known well time: Unknown  ED Provider first to bedside at: 0907  CT Results received at: 0955  Patient was not treated with TPA due to the following reason(s):  Status post intracranial or subarachnoid hemorhage    National Institutes of Health Stroke Scale (Baseline)  Time Performed: 0907      Score    Level of consciousness: (0)   Alert, keenly responsive    LOC questions: (0)   Answers both questions correctly    LOC commands: (0)   Performs both tasks correctly    Best gaze: (0)   Normal    Visual: (0)   No visual loss     Facial palsy: (0)   Normal symmetrical movements    Motor arm (left): (0)   No drift    Motor arm (right): (0)   No drift    Motor leg (left): (2)   Some effort against gravity    Motor leg (right): (0)   No drift    Limb ataxia: (1)   Present in one limb    Sensory: (0)   Normal- no sensory loss    Best language: (0)   Normal- no aphasia    Dysarthria: (0)   Normal    Extinction and inattention: (0)   No abnormality        Total Score:  3      Stroke Mimics were considered (including migraine headache, seizure disorder, hypoglycemia (or hyperglycemia), head or spinal trauma, CNS infection, Toxin ingestion and shock state (e.g. sepsis) .    Medical Decision Making:  This unfortunate middle-aged gentleman presents after being found down at work. He clearly had a seizure with a post-ictal state and loss of bladder control. On my assessment, he was clearing. His neurological exam shows decreased strength in his left leg, which is a new finding. With this, a code stroke was called, and the patient was sent for CT. His non-contrast head CT shows a rupture of his anterior communicating cerebral artery, and with this, Dr. Chang was at the bedside getting neuro-interventional radiology on board. The patient is significantly hypertensive, and therefore, was started on a Nicardipine drip and was titrated up to a blood pressure that was more reasonable, he otherwise has normal labs and appears surprisingly well. At this point, he will be dispositioned to the interventional suite, from there on, he will be transferred to the ICU. I spoke with Dr. Sam of hospitalist services who will primarily admit the patient while with Dr. Chang consulting.     Critical Care time:  was 40 minutes for this patient excluding procedures.    Diagnosis:    ICD-10-CM   1. Subarachnoid hemorrhage (H) I60.9   2. Convulsions, unspecified convulsion type (H) R56.9   3. Malignant essential hypertension I10   4. Cerebral aneurysm rupture (H) I60.9      Daisy Bernal  1/23/2017    EMERGENCY DEPARTMENT    I, Daisy Bernal, am serving as a scribe at 9:07 AM on 1/23/2017 to document services personally performed by Trierweiler, Chad A, MD based on my observations and the provider's statements to me.      Trierweiler, Chad A, MD  01/23/17 1538

## 2017-01-23 NOTE — ED NOTES
[facility]  ED Nurse Handoff Report    ED Chief complaint: Altered Mental Status      ED Diagnosis:   Final diagnoses:   Cerebral aneurysm rupture (H)       Code Status: Full Code    Allergies: No Known Allergies    Activity level:  Patient typically ndependent, today has remained in ED gurney while here.     Needed?: No    Isolation: No  Infection: Not Applicable    Bariatric?: No      Vital Signs:   Filed Vitals:    01/23/17 1030 01/23/17 1035 01/23/17 1040 01/23/17 1045   BP: 129/89      Temp:       TempSrc:       Resp: 8 14 9 8   Height:       SpO2: 96%  95% 90%       Cardiac Rhythm: ,        Pain level: 0-10 Pain Scale: 5    Is this patient confused?: No    Patient Report: Initial Complaint: Confusion, headache, possible seizure  Focused Assessment: Patient brought in by EMS from [facility]. Patient was working and was found down and unresponsive. EMS called and patient was unresponsive for them initially. Upon arriving to ED patient was alert and opened eyes spontaneously. Patient was incontinent of urine. Patient did stand at bedside and had difficulty bearing weight on his left leg. Patient has an art line, Nicardipine drip and IVF bolus. CT of head shows a head bleed from an aneurysm. Patient will be going to IR.       Tests Performed: CT, labs  Abnormal Results: See EPIC  Treatments provided: Nicardipine drip, zofran and arterial line    Family Comments: Wife was called and on her way.    OBS brochure/video discussed/provided to patient: N/A    ED Medications:   Medications   sodium chloride (PF) 0.9% PF flush 3 mL (3 mLs Intracatheter Given 1/23/17 1039)   sodium chloride (PF) 0.9% PF flush 3 mL (3 mLs Intracatheter Not Given 1/23/17 0924)   0.9% sodium chloride BOLUS (1,000 mLs Intravenous New Bag 1/23/17 0924)     Followed by   0.9% sodium chloride infusion (1,000 mLs Intravenous Not Given 1/23/17 0924)   niCARdipine 40 mg in 200 mL 0.9% NaCl (CARDENE) infusion (10 mg/hr  Intravenous Rate/Dose Change 1/23/17 1022)   levETIRAcetam (KEPPRA) 500 mg in NaCl 0.9 % 100 mL intermittent infusion (0 mg Intravenous Stopped 1/23/17 1045)   ondansetron (ZOFRAN) injection 4 mg (4 mg Intravenous Given by Other Clinician 1/23/17 0909)   iopamidol (ISOVUE-370) solution 120 mL (120 mLs Intravenous Given 1/23/17 1022)   sodium chloride 0.9 % for CT scan flush dose 100 mL (100 mLs Intravenous Given 1/23/17 1022)   fentaNYL Citrate (PF) (SUBLIMAZE) injection 50 mcg (50 mcg Intravenous Given 1/23/17 1039)       Drips infusing?:  Yes--Nicardipine      ED NURSE PHONE NUMBER: [phone]

## 2017-01-23 NOTE — LETTER
Transition Communication Hand-off for Care Transitions to Next Level of Care Provider    Name: Malvin Au  MRN #: [id number]  Primary Care Provider: Mariya Cramer  Primary Care MD Name: Shoaib  Primary Clinic: [address] [address]  Primary Care Clinic Name: [facility]  Reason for Hospitalization:  Subarachnoid hemorrhage (H) [I60.9]  Malignant essential hypertension [I10]  Cerebral aneurysm rupture (H) [I60.9]  Convulsions, unspecified convulsion type (H) [R56.9]  SAH (subarachnoid hemorrhage) (H) [I60.9]  Admit Date/Time: 1/23/2017  9:09 AM  Discharge Date: 2/1/2017    Payor Source: Payor: BCBS / Plan: BCBS OUT OF STATE / Product Type: Indemnity /       Plan of Care Goals/Milestone Events:   Patient Concerns: can't drive, headaches, low sodium   Patient Goals:   Short-term control headaches, keep sodium stable   Long-term be able to drive.           Reason for Communication Hand-off Referral: Other admit with ruptured aneurysm    Discharge Plan:  Home with outpt OT. Wife will stay with pt.      Concern for non-adherence with plan of care:  no  Discharge Needs Assessment:  Needs       Most Recent Value    Equipment Currently Used at Home none    Transportation Available car, family or friend will provide           Follow-up specialty is recommended: Yes    Follow-up plan:  Future Appointments  Date Time Provider Department Center   2/3/2017 10:00 AM BA LAB ONLY [facility]   2/6/2017 10:40 AM Khadijah Willard MD [facility]   2/13/2017 4:40 PM Mariya Cramer MD [facility]       Any outstanding tests or procedures:        Referrals     Future Labs/Procedures    Occupational Therapy Referral     Comments:    *This therapy referral will be filtered to a centralized scheduling office at [facility] and the patient will receive a call to schedule an appointment at a [place] location most convenient for  them. *     [facility] provides Occupational Therapy evaluation and treatment and many specialty services across the [place] system.  If requesting a specialty program, please choose from the list below.    If you have not heard from the scheduling office within 2 business days, please call [phone] for all locations, with the exception of [place], please call [phone].     Treatment: Evaluation & Treatment  Special Instructions/Modalities: Cognitive Intervention  Special Programs: None    Please be aware that coverage of these services is subject to the terms and limitations of your health insurance plan.  Call member services at your health plan with any benefit or coverage questions.      **Note to Provider:  If you are referring outside of [place] for the therapy appointment, please list the name of the location in the  special instructions  above, print the referral and give to the patient to schedule the appointment.    Physical Therapy Referral     Comments:    *This therapy referral will be filtered to a centralized scheduling office at [facility] and the patient will receive a call to schedule an appointment at a [place] location most convenient for them. *     [facility] provides Physical Therapy evaluation and treatment and many specialty services across the [facility] system.  If requesting a specialty program, please choose from the list below.    If you have not heard from the scheduling office within 2 business days, please call [phone] for all locations, with the exception of [place], please call [phone].  Treatment: Evaluation & Treatment  Special Instructions/Modalities: Balance and Gait  Special Programs: None    Please be aware that coverage of these services is subject to the terms and limitations of your health insurance plan.  Call member services at your health plan with any benefit or coverage questions.       **Note to Provider:  If you are referring outside of [place] for the therapy appointment, please list the name of the location in the  special instructions  above, print the referral and give to the patient to schedule the appointment.            Key Recommendations:    Still having headaches. Low sodium, recheck on Friday and fax results to Dr. Chang with neuro. Can't drive until follow up with Dr. Chang in 2 weeks. Wife will be with pt. Can you call in a few days to assess how pt is doing? Thanks!    Adele Santiago    AVS/Discharge Summary is the source of truth; this is a helpful guide for improved communication of patient story

## 2017-01-23 NOTE — PROCEDURES
Interventional Neuroradiology Post Procedure    Patient Name: Malvin Au  MRN: [id number]  Date of Procedure: January 23, 2017    Procedure: Cerebral angiogram                       Coil embolization of a ruptured anterior communicating artery aneurysm  Radiologist: Terrence Beltran MD and Aj Caruso MD    Contrast: 135 ml Isovue                    20 ml visipaque  Fluoro Time: 42.2 minutes  Air Kerma: 4783 mGy  Medications: See anesthesia flowsheet    EBL: 70 ml  Complications: none    Preliminary Report:   (See dictation for full detail)  - 5.2 x 5 mm ACOM aneurysm with a 2.9 x 2.4 mm daughter bleb/pseudoaneurysm and broad 3 mm neck  - Technically successful coil embolization with occlusion of rupture site and good coil filling of the aneurysm proper  - No evidence of thromboembolic complications    Assess/Plan:  Bedrest with Rt leg straight while sheath in place and for 4 hours post procedure  Admit to ICU  Discontinue femoral sheath at 1800  Follow up cerebral angiogram in 6 months  Report to Dr Charlene Beltran MD  [phone]

## 2017-01-23 NOTE — ANESTHESIA POSTPROCEDURE EVALUATION
Patient: Malvin Au    ANESTHESIA OUT OF OR MRI (N/A Update)  Additional InformationProcedure(s):  ANESTHESIA NEEDED FOR CEREBRAL COILING    Diagnosis:UNKNOWN  Diagnosis Additional Information: No value filed.    Anesthesia Type:  General, ETT    Note:  Anesthesia Post Evaluation    Patient location during evaluation: PACU  Patient participation: Able to fully participate in evaluation  Level of consciousness: awake  Pain management: adequate  Airway patency: patent  Cardiovascular status: acceptable  Respiratory status: acceptable  Hydration status: acceptable  PONV: controlled     Anesthetic complications: None          Last vitals:  Filed Vitals:    01/23/17 1610 01/23/17 1620 01/23/17 1630   BP:   125/75   Pulse:      Temp:  36.3  C (97.4  F)    Resp: 15 16 16   SpO2: 97% 94% 96%       Electronically Signed By: Chayo Washington MD  January 23, 2017  4:46 PM

## 2017-01-23 NOTE — IR NOTE
EOC.  Pt tolerated well.  VSS.  Pt will be transferred to ICU (room 371).  Pt's right femoral artery sheath will be pulled at 1800 by IR staff.  Sheath sutured in place by BRAYDEN Nelson RTR.

## 2017-01-23 NOTE — H&P
PRIMARY CARE PHYSICIAN:  None stated.       DATE OF SERVICE:  01/23/2017      CHIEF COMPLAINT:  Loss of consciousness.      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 this morning and was found unconscious.  He was seen by EMS, found to be drooling, hypertensive with blood pressure of 230/159 and remained unresponsive despite a sternal rub.  There was evidence of incontinence, but no evidence of any tonic-clonic behavior or trauma.  The patient awoke initially confused with postictal behavior and did have evidence of nonintact short-term memory.  He was seen in the emergency department and CT scan was obtained showing a large subarachnoid hemorrhage.  I was contacted by the emergency room physician, Dr. Trierweiler, regarding this and the need for admission.  The patient was also seen emergently by Dr. Chang of the Neuro Critical Care Service due to the aforementioned findings.  On my questioning, Mr. Au feels that his memory is improving; however, his recall of the events of the morning is still somewhat fuzzy.  He does report having headaches that have been going on for a few weeks and acknowledges longstanding history of hypertension for which he has not taken any blood pressure medicines.  He also notes that he has been taking ibuprofen recently for the headaches but no other new medications.  He denies being on any blood thinners such as warfarin or heparin.  The patient denies any personal history of diabetes or thyroid disease.  He denies any smoking history.  He does drink more alcohol than he thinks he should drink.  When asked to clarify, he was unable to give me a clear number but he believes that he could cut back.  On my questioning, he offers no other complaints, has not had any fevers or chills or rashes.  He has not  had any chest pain or shortness of breath.  He does not completely recall the events that led up to him passing out.  He does acknowledge that his left leg remains weak at this time and feels somehow different than the rest of his body.  He denies any speech or swallowing difficulties.      PAST MEDICAL HISTORY:  Hypertension.      PAST SURGICAL HISTORY:  None.      ALLERGIES:  None known to date.      PRIOR TO ADMISSION MEDICATIONS:   1.  Ibuprofen 600 mg p.o. 4 times daily p.r.n. for moderate pain.   2.  Multivitamin 1 tablet p.o. daily.   3.  Vitamin D daily p.r.n.      FAMILY HISTORY:  He believes his mother has hypertension.      SOCIAL HISTORY:  He is a nonsmoker, but he does drink alcohol as noted above.      REVIEW OF SYSTEMS:  A 10-point system review was performed and was negative with the exception of those complaints noted in the HPI.      PHYSICAL EXAMINATION:   VITAL SIGNS:  Temperature 98.1, heart rate 89, blood pressure 181/115, respiratory rate 20, O2 saturation 94% on room air.   GENERAL:  This is a pleasant man who looks his stated age.   HEENT:  Exam  reveals no facial asymmetry.  His pupils are equal, reactive to light bilaterally, and his extraocular movements are intact.  Oropharynx is clear with tongue being midline.  Uvula is midline.   RESPIRATORY:  Clear lung fields bilaterally without crackles or wheezes.   CARDIAC:  Regular rate and rhythm, S1, S2 heard.  No murmurs.   ABDOMEN:  Soft, nontender, nondistended.  Bowel sounds are present.   EXTREMITIES:  Negative for significant pedal edema.  Pedal pulses are about 1+ bilaterally.  There is no significant pedal edema noted.   NEUROLOGIC:  Cranial nerves II through XII are grossly intact on cursory exam.  He does have decreased strength in his left lower extremity against gravity, approximately 4/5.   SKIN:  Negative for signs of jaundice.      LABORATORY FINDINGS:  Sodium is 140, potassium 3.5, chloride 105, CO2 29, BUN is 12, creatinine  0.68, calcium is 8.6, anion gap 6, glucose 124, white blood cell count 6.3, hemoglobin 14.4, platelets of 180,000.      A noncontrast head CT was performed which I personally reviewed and I agree with the radiologist's report of an extensive subarachnoid hemorrhage in a pattern suggesting an aneurysmal rupture as well as evidence of mild hydrocephalus.  There was no evidence of ischemic infarct.  The CT scan with contrast was performed which showed a normal CT perfusion of the brain.      ASSESSMENT AND PLAN:  Mr. Alvarez is a 41-year-old gentleman who presented with a subarachnoid hemorrhage.      1.  Subarachnoid hemorrhage.  This was felt to be likely secondary to a ruptured aneurysm.  Neuro Critical Care has been consulted.  He will be going to Interventional Radiology for cerebral angiogram with possible embolization.  We will continue on a nicardipine drip, this was started in the Emergency Department.  We will admit him to the ICU, will avoid antiplatelet agents and avoid blood thinners.  We will keep him on telemetry with plans to eventually transition him to oral antihypertensives.  He will also benefit from physical therapy, speech pathology and occupational therapy consultation.  Keppra has been started by the critical care staff and this will be continued.     2. Hypertensive emergency: A Nicardipine drip will be used to control his blood pressures.     3 convulsions secondary to #1. Keppra has been initiated.     4.  Deep venous thrombosis prophylaxis will be mechanical.      CODE STATUS:  Full.      DISPOSITION:  I anticipate at least a 2 midnight stay.  As such, I certify he meets criteria for inpatient medical status.         KATY LU MD             D: 2017 13:05   T: 2017 14:06   MT: PUNEET      Name:     CORAZON ALVAREZ   MRN:      [id number]        Account:      [id number]   :      1975           Admitted:     [id number]      Document: [id number]

## 2017-01-23 NOTE — ED NOTES
Bed: ST01  Expected date:   Expected time:   Means of arrival:   Comments:  514  41 M confusion  0912

## 2017-01-24 ENCOUNTER — APPOINTMENT (OUTPATIENT)
Dept: CT IMAGING | Facility: CLINIC | Age: 42
DRG: 021 | End: 2017-01-24
Attending: PSYCHIATRY & NEUROLOGY
Payer: COMMERCIAL

## 2017-01-24 ENCOUNTER — APPOINTMENT (OUTPATIENT)
Dept: OCCUPATIONAL THERAPY | Facility: CLINIC | Age: 42
DRG: 021 | End: 2017-01-24
Attending: INTERNAL MEDICINE
Payer: COMMERCIAL

## 2017-01-24 ENCOUNTER — APPOINTMENT (OUTPATIENT)
Dept: SPEECH THERAPY | Facility: CLINIC | Age: 42
DRG: 021 | End: 2017-01-24
Attending: INTERNAL MEDICINE
Payer: COMMERCIAL

## 2017-01-24 ENCOUNTER — APPOINTMENT (OUTPATIENT)
Dept: CARDIOLOGY | Facility: CLINIC | Age: 42
DRG: 021 | End: 2017-01-24
Attending: PSYCHIATRY & NEUROLOGY
Payer: COMMERCIAL

## 2017-01-24 PROBLEM — I60.9 SAH (SUBARACHNOID HEMORRHAGE) (H): Status: ACTIVE | Noted: 2017-01-24

## 2017-01-24 LAB
ANION GAP SERPL CALCULATED.3IONS-SCNC: 9 MMOL/L (ref 3–14)
BUN SERPL-MCNC: 6 MG/DL (ref 7–30)
CALCIUM SERPL-MCNC: 8 MG/DL (ref 8.5–10.1)
CHLORIDE SERPL-SCNC: 102 MMOL/L (ref 94–109)
CO2 SERPL-SCNC: 26 MMOL/L (ref 20–32)
CREAT SERPL-MCNC: 0.64 MG/DL (ref 0.66–1.25)
DEPRECATED CALCIDIOL+CALCIFEROL SERPL-MC: 36 UG/L (ref 20–75)
ERYTHROCYTE [DISTWIDTH] IN BLOOD BY AUTOMATED COUNT: 12.4 % (ref 10–15)
GFR SERPL CREATININE-BSD FRML MDRD: ABNORMAL ML/MIN/1.7M2
GLUCOSE SERPL-MCNC: 132 MG/DL (ref 70–99)
HCT VFR BLD AUTO: 36 % (ref 40–53)
HGB BLD-MCNC: 13 G/DL (ref 13.3–17.7)
MCH RBC QN AUTO: 32.4 PG (ref 26.5–33)
MCHC RBC AUTO-ENTMCNC: 36.1 G/DL (ref 31.5–36.5)
MCV RBC AUTO: 90 FL (ref 78–100)
PLATELET # BLD AUTO: 196 10E9/L (ref 150–450)
POTASSIUM SERPL-SCNC: 3.6 MMOL/L (ref 3.4–5.3)
RBC # BLD AUTO: 4.01 10E12/L (ref 4.4–5.9)
SODIUM SERPL-SCNC: 137 MMOL/L (ref 133–144)
TROPONIN I SERPL-MCNC: 0.06 UG/L (ref 0–0.04)
TROPONIN I SERPL-MCNC: 0.08 UG/L (ref 0–0.04)
TROPONIN I SERPL-MCNC: 0.1 UG/L (ref 0–0.04)
TROPONIN I SERPL-MCNC: 0.1 UG/L (ref 0–0.04)
WBC # BLD AUTO: 11.6 10E9/L (ref 4–11)

## 2017-01-24 PROCEDURE — 25000125 ZZHC RX 250: Performed by: RADIOLOGY

## 2017-01-24 PROCEDURE — 84484 ASSAY OF TROPONIN QUANT: CPT | Performed by: PSYCHIATRY & NEUROLOGY

## 2017-01-24 PROCEDURE — 25000125 ZZHC RX 250: Performed by: PSYCHIATRY & NEUROLOGY

## 2017-01-24 PROCEDURE — 40000264 ECHO BUBBLE STUDY W/LUMASON

## 2017-01-24 PROCEDURE — 36415 COLL VENOUS BLD VENIPUNCTURE: CPT | Performed by: INTERNAL MEDICINE

## 2017-01-24 PROCEDURE — 83516 IMMUNOASSAY NONANTIBODY: CPT | Performed by: PSYCHIATRY & NEUROLOGY

## 2017-01-24 PROCEDURE — 70450 CT HEAD/BRAIN W/O DYE: CPT

## 2017-01-24 PROCEDURE — 84484 ASSAY OF TROPONIN QUANT: CPT | Performed by: INTERNAL MEDICINE

## 2017-01-24 PROCEDURE — 25000128 H RX IP 250 OP 636: Performed by: PSYCHIATRY & NEUROLOGY

## 2017-01-24 PROCEDURE — 93306 TTE W/DOPPLER COMPLETE: CPT | Mod: 26 | Performed by: INTERNAL MEDICINE

## 2017-01-24 PROCEDURE — 99233 SBSQ HOSP IP/OBS HIGH 50: CPT | Performed by: INTERNAL MEDICINE

## 2017-01-24 PROCEDURE — 25000128 H RX IP 250 OP 636: Performed by: RADIOLOGY

## 2017-01-24 PROCEDURE — 97165 OT EVAL LOW COMPLEX 30 MIN: CPT | Mod: GO

## 2017-01-24 PROCEDURE — 82607 VITAMIN B-12: CPT | Performed by: PSYCHIATRY & NEUROLOGY

## 2017-01-24 PROCEDURE — 92610 EVALUATE SWALLOWING FUNCTION: CPT | Mod: GN

## 2017-01-24 PROCEDURE — 25000132 ZZH RX MED GY IP 250 OP 250 PS 637: Performed by: PSYCHIATRY & NEUROLOGY

## 2017-01-24 PROCEDURE — 80048 BASIC METABOLIC PNL TOTAL CA: CPT | Performed by: INTERNAL MEDICINE

## 2017-01-24 PROCEDURE — 25000125 ZZHC RX 250: Performed by: INTERNAL MEDICINE

## 2017-01-24 PROCEDURE — 85027 COMPLETE CBC AUTOMATED: CPT | Performed by: INTERNAL MEDICINE

## 2017-01-24 PROCEDURE — 82607 VITAMIN B-12: CPT | Performed by: INTERNAL MEDICINE

## 2017-01-24 PROCEDURE — 97535 SELF CARE MNGMENT TRAINING: CPT | Mod: GO

## 2017-01-24 PROCEDURE — 40000225 ZZH STATISTIC SLP WARD VISIT

## 2017-01-24 PROCEDURE — 25000132 ZZH RX MED GY IP 250 OP 250 PS 637: Performed by: RADIOLOGY

## 2017-01-24 PROCEDURE — 40000133 ZZH STATISTIC OT WARD VISIT

## 2017-01-24 PROCEDURE — 25500064 ZZH RX 255 OP 636: Performed by: INTERNAL MEDICINE

## 2017-01-24 PROCEDURE — 20000003 ZZH R&B ICU

## 2017-01-24 PROCEDURE — 25000128 H RX IP 250 OP 636: Performed by: EMERGENCY MEDICINE

## 2017-01-24 RX ORDER — LORAZEPAM 2 MG/ML
.5-1 INJECTION INTRAMUSCULAR EVERY 4 HOURS PRN
Status: DISCONTINUED | OUTPATIENT
Start: 2017-01-24 | End: 2017-01-29

## 2017-01-24 RX ORDER — NIMODIPINE 30 MG/1
60 CAPSULE, LIQUID FILLED ORAL EVERY 4 HOURS
Status: DISCONTINUED | OUTPATIENT
Start: 2017-01-24 | End: 2017-02-01 | Stop reason: HOSPADM

## 2017-01-24 RX ORDER — HYDRALAZINE HYDROCHLORIDE 20 MG/ML
10-20 INJECTION INTRAMUSCULAR; INTRAVENOUS
Status: DISCONTINUED | OUTPATIENT
Start: 2017-01-24 | End: 2017-02-01 | Stop reason: HOSPADM

## 2017-01-24 RX ORDER — LABETALOL HYDROCHLORIDE 5 MG/ML
10-20 INJECTION, SOLUTION INTRAVENOUS EVERY 10 MIN PRN
Status: DISCONTINUED | OUTPATIENT
Start: 2017-01-24 | End: 2017-01-29

## 2017-01-24 RX ORDER — ONDANSETRON 2 MG/ML
4 INJECTION INTRAMUSCULAR; INTRAVENOUS EVERY 6 HOURS SCHEDULED
Status: DISCONTINUED | OUTPATIENT
Start: 2017-01-24 | End: 2017-01-29

## 2017-01-24 RX ORDER — TRAMADOL HYDROCHLORIDE 50 MG/1
50 TABLET ORAL EVERY 6 HOURS PRN
Status: DISCONTINUED | OUTPATIENT
Start: 2017-01-24 | End: 2017-02-01 | Stop reason: HOSPADM

## 2017-01-24 RX ADMIN — LEVETIRACETAM 500 MG: 100 INJECTION, SOLUTION INTRAVENOUS at 10:35

## 2017-01-24 RX ADMIN — LEVETIRACETAM 500 MG: 100 INJECTION, SOLUTION INTRAVENOUS at 22:05

## 2017-01-24 RX ADMIN — ONDANSETRON HYDROCHLORIDE 4 MG: 2 INJECTION, SOLUTION INTRAMUSCULAR; INTRAVENOUS at 18:16

## 2017-01-24 RX ADMIN — HYDROMORPHONE HYDROCHLORIDE 0.2 MG: 1 INJECTION, SOLUTION INTRAMUSCULAR; INTRAVENOUS; SUBCUTANEOUS at 19:23

## 2017-01-24 RX ADMIN — NIMODIPINE 60 MG: 30 CAPSULE ORAL at 14:58

## 2017-01-24 RX ADMIN — HYDROMORPHONE HYDROCHLORIDE 0.2 MG: 1 INJECTION, SOLUTION INTRAMUSCULAR; INTRAVENOUS; SUBCUTANEOUS at 13:00

## 2017-01-24 RX ADMIN — HYDROMORPHONE HYDROCHLORIDE 0.2 MG: 1 INJECTION, SOLUTION INTRAMUSCULAR; INTRAVENOUS; SUBCUTANEOUS at 05:07

## 2017-01-24 RX ADMIN — HYDROMORPHONE HYDROCHLORIDE 0.2 MG: 1 INJECTION, SOLUTION INTRAMUSCULAR; INTRAVENOUS; SUBCUTANEOUS at 03:22

## 2017-01-24 RX ADMIN — ONDANSETRON HYDROCHLORIDE 4 MG: 2 INJECTION, SOLUTION INTRAMUSCULAR; INTRAVENOUS at 05:13

## 2017-01-24 RX ADMIN — HYDROMORPHONE HYDROCHLORIDE 0.2 MG: 1 INJECTION, SOLUTION INTRAMUSCULAR; INTRAVENOUS; SUBCUTANEOUS at 07:32

## 2017-01-24 RX ADMIN — OXYCODONE HYDROCHLORIDE 10 MG: 5 TABLET ORAL at 21:17

## 2017-01-24 RX ADMIN — SULFUR HEXAFLUORIDE 2 ML: KIT at 11:00

## 2017-01-24 RX ADMIN — ONDANSETRON HYDROCHLORIDE 4 MG: 2 INJECTION, SOLUTION INTRAMUSCULAR; INTRAVENOUS at 11:41

## 2017-01-24 RX ADMIN — NIMODIPINE 60 MG: 30 CAPSULE ORAL at 22:05

## 2017-01-24 RX ADMIN — SODIUM CHLORIDE 1000 ML: 9 INJECTION, SOLUTION INTRAVENOUS at 09:23

## 2017-01-24 RX ADMIN — ONDANSETRON HYDROCHLORIDE 4 MG: 2 INJECTION, SOLUTION INTRAMUSCULAR; INTRAVENOUS at 23:48

## 2017-01-24 RX ADMIN — HYDROMORPHONE HYDROCHLORIDE 0.2 MG: 1 INJECTION, SOLUTION INTRAMUSCULAR; INTRAVENOUS; SUBCUTANEOUS at 01:18

## 2017-01-24 RX ADMIN — NIMODIPINE 60 MG: 30 CAPSULE ORAL at 10:13

## 2017-01-24 RX ADMIN — HYDROMORPHONE HYDROCHLORIDE 0.2 MG: 1 INJECTION, SOLUTION INTRAMUSCULAR; INTRAVENOUS; SUBCUTANEOUS at 17:30

## 2017-01-24 RX ADMIN — HYDROMORPHONE HYDROCHLORIDE 0.2 MG: 1 INJECTION, SOLUTION INTRAMUSCULAR; INTRAVENOUS; SUBCUTANEOUS at 22:48

## 2017-01-24 RX ADMIN — METOCLOPRAMIDE 10 MG: 5 INJECTION, SOLUTION INTRAMUSCULAR; INTRAVENOUS at 07:38

## 2017-01-24 RX ADMIN — NIMODIPINE 60 MG: 30 CAPSULE ORAL at 18:16

## 2017-01-24 RX ADMIN — SODIUM CHLORIDE 1000 ML: 9 INJECTION, SOLUTION INTRAVENOUS at 19:19

## 2017-01-24 ASSESSMENT — PAIN DESCRIPTION - DESCRIPTORS
DESCRIPTORS: HEADACHE

## 2017-01-24 ASSESSMENT — VISUAL ACUITY
OU: NORMAL ACUITY
OU: NORMAL ACUITY

## 2017-01-24 ASSESSMENT — ACTIVITIES OF DAILY LIVING (ADL): PREVIOUS_RESPONSIBILITIES: MEAL PREP;HOUSEKEEPING;LAUNDRY;SHOPPING;YARDWORK;FINANCES;DRIVING;WORK

## 2017-01-24 NOTE — PLAN OF CARE
Problem: Goal Outcome Summary  Goal: Goal Outcome Summary  SLP: Bedside swallow eval completed per MD orders. Pt oropharyngeal swallow function is within normal limits. Pts oral musculature demonstrates adequate strength, symmetry, and coordination. Pt tolerated all PO trials with no overt s/sx of aspiration and no c/o pharyngeal residue. Recommend pt initiate regular textures (gluten free) and thin liquids. Pt should be fully upright for all PO, take small sips/bites, and pace self. No further ST needs indicated at this time. Will complete ST orders.

## 2017-01-24 NOTE — PLAN OF CARE
Problem: Goal Outcome Summary  Goal: Goal Outcome Summary  PT: Attempted evaluation x2, pt gone to procedure, now with nausea.       Per OT, pt moving well, limited by HA and nausea. Recommends holding PT evaluation until pt out of ICU, anticipates little if any PT needs.

## 2017-01-24 NOTE — PROGRESS NOTES
01/24/17 1435   Quick Adds   Type of Visit Initial Occupational Therapy Evaluation   Living Environment   Lives With spouse;child(amy), dependent   Living Arrangements house   Home Accessibility stairs to enter home;stairs within home;bed and bath are not on the first floor   Number of Stairs to Enter Home 5   Number of Stairs Within Home 18   Transportation Available car;family or friend will provide  (Pt still drives; however, family can provide for now.)   Self-Care   Dominant Hand right   Usual Activity Tolerance excellent   Current Activity Tolerance moderate   Equipment Currently Used at Home none   Functional Level Prior   Ambulation 0-->independent   Transferring 0-->independent   Toileting 0-->independent   Bathing 0-->independent   Dressing 0-->independent   Eating 0-->independent   Communication 0-->understands/communicates without difficulty   Swallowing 0-->swallows foods/liquids without difficulty   Cognition 0 - no cognition issues reported   Fall history within last six months no   General Information   Onset of Illness/Injury or Date of Surgery - Date 01/23/17   Referring Physician Demetrius Farah MD   Patient/Family Goals Statement Pt plans to discharge home soon.    Additional Occupational Profile Info/Pertinent History of Current Problem Admitted with altered mental status due to subarachnoid hemorrhage. Seen following cerebral angio with coiling of ruptured aneurysm.    Precautions/Limitations fall precautions   General Observations Wife present and very supportive   Cognitive Status Examination   Orientation orientation to person, place and time   Level of Consciousness alert   Able to Follow Commands WNL/WFL   Personal Safety (Cognitive) at risk behaviors demonstrated   Memory (Will continue to monitor and assess as needed.)   Visual Perception   Visual Perception No deficits were identified   Visual Perception Comments Denies blurred and/or doubled vision.    Sensory Examination   Sensory Quick  Adds Light touch;No deficits were identified   Sensory Comments Denies B UE numbness and/or tingling   Sensation Light Touch, Rehab Eval   LUE within normal limits   RUE within normal limits   Pain Assessment   Patient Currently in Pain Yes, see Vital Sign flowsheet  (6/10 headache and R groin pain)   Range of Motion (ROM)   ROM Quick Adds No deficits were identified   ROM Comment B UE WFL    Strength   Manual Muscle Testing Quick Adds No deficits were identified   Strength Comments B UE 5/5 on MMT   Hand Strength   Hand Strength Comments B grasp WFL   Coordination   Upper Extremity Coordination No deficits were identified   Mobility   Bed Mobility Comments SBA for supine<>sit   Transfer Skill: Bed to Chair/Chair to Bed   Level of [place]: Bed to Chair stand-by assist   Transfer Skill: Sit to Stand   Level of [place]: Sit/Stand stand-by assist   Transfer Skill: Toilet Transfer   Level of [place]: Toilet stand-by assist   Upper Body Dressing   Level of [place]: Dress Upper Body stand-by assist   Lower Body Dressing   Level of [place]: Dress Lower Body stand-by assist   Toileting   Level of [place]: Toilet stand-by assist   Grooming   Level of [place]: Grooming stand-by assist   Instrumental Activities of Daily Living (IADL)   Previous Responsibilities meal prep;housekeeping;laundry;shopping;yardwork;finances;driving;work   Activities of Daily Living Analysis   Impairments Contributing to Impaired Activities of Daily Living pain   General Therapy Interventions   Planned Therapy Interventions ADL retraining;cognition;transfer training   Clinical Impression   Criteria for Skilled Therapeutic Interventions Met yes, treatment indicated   OT Diagnosis Decreased I and safety with ADLs   Influenced by the following impairments Pain; Decreased activity tolerance; Impaired safety   Assessment of Occupational Performance 1-3 Performance Deficits   Identified Performance Deficits Pain;  "Decreased activity tolerance; Impaired safety   Clinical Decision Making (Complexity) Low complexity   Therapy Frequency daily   Predicted Duration of Therapy Intervention (days/wks) 5 days   Anticipated Discharge Disposition Home with Assist   Risks and Benefits of Treatment have been explained. Yes   Patient, Family & other staff in agreement with plan of care Yes   [facility] TM \"6 Clicks\"   2016, Trustees of [facility], under license to [employer].  All rights reserved.   6 Clicks Short Forms Daily Activity Inpatient Short Form   [facility]  \"6 Clicks\" Daily Activity Inpatient Short Form   1. Putting on and taking off regular lower body clothing? 3 - A Little   2. Bathing (including washing, rinsing, drying)? 3 - A Little   3. Toileting, which includes using toilet, bedpan or urinal? 3 - A Little   4. Putting on and taking off regular upper body clothing? 4 - None   5. Taking care of personal grooming such as brushing teeth? 3 - A Little   6. Eating meals? 4 - None   Daily Activity Raw Score (Score out of 24.Lower scores equate to lower levels of function) 20   Total Evaluation Time   Total Evaluation Time (Minutes) 12     "

## 2017-01-24 NOTE — PROGRESS NOTES
[facility]  [facility]  Neuro-ICU Progress Note       Admission Date: 2017  Date of Service:2017   Hospital Day: 2   _________________________________     Main Plans for Today  --CT head  -start TCDs   Assessment and Plan  #(I60.9) Subarachnoid hemorrhage (H)  (primary encounter diagnosis)  --Arevalo grade III, Mcguire-Salmeron grade II SAH  --Secondary to ruptured anterior communicating artery aneurysm   --Repeat CT head for monitoring of mild hydrocephalus  (R56.9) Convulsions, unspecified convulsion type (H)  --Seizure on the onset.   --Keppra for a week  (I10) Malignant essential hypertension  --Keep BP <180 mm Hg  (I60.9) Cerebral aneurysm rupture (H)  --Coiled 5.2 mm AcomA aneurysm 17  #. Cardiovascular  --Minimal elevation of troponin  --Echo pending  #. Infection   --Mild elevation in WBC, reactive  #. Endocrine:   --Insulin protocol to keep blood sugars 100-150.   #. Heme/Onc:   --No acute issues  #. Renal  --Sodium goal 140-155  #. Skin/Musculoskeletal:  -- No issues   #. PT/OT/Speech  --continue  evaluations   #.Nutrition  --Per speech therapy evaluation   #. ICU prophylaxis:   --Stress ulcer prophylaxis Protonix  --DVT prophylaxis: mechanical only due to SAH  #. Lines  Arterial line:right radial artery placed on 17 ;Arterial line present, needed and to be continued  Restraints for medical healing needed:Not needed    CODE STATUS: Full Code    Family update by me today: yes    Interval History  Aneurysm coiled on 17. Overnight, a lot of nausea, vomiting. Headache continues. No seizures. No weakness.     Physical Exam    Vitals: Blood pressure 125/75, pulse 76, temperature 98.5  F (36.9  C), temperature source Oral, resp. rate 19, height 1.829 m (6'), weight 104.327 kg (230 lb), SpO2 93 %.  Tmax: Temp (24hrs), Av.8  F (36.6  C), Min:97.2  F (36.2  C), Max:98.5  F (36.9  C)    Vital Signs with Ranges  Temp:  [97.2  F (36.2  C)-98.5  F (36.9  C)]  98.5  F (36.9  C)  Pulse:  [76] 76  Heart Rate:  [] 75  Resp:  [7-86] 19  BP: (117-181)/() 125/75 mmHg  MAP:  [74 mmHg-112 mmHg] 104 mmHg  Arterial Line BP: ()/(59-91) 147/81 mmHg  SpO2:  [90 %-100 %] 93 %   I&O:     I/O last 3 completed shifts:  In: 5410 [I.V.:4410; IV Piggyback:1000]  Out: 3525 [Urine:3525] I/O since admission: 1885         General Appearance:   No acute distress  Neuro:       Mental Status Exam:   Awake, alert, oriented X3. Speech and language are intact. Mental status is normal       Cranial Nerves:  Pupils 3 mm, reactive. EOMI. Face sensation is normal. Face is symmetric.Tongue and uvula are midline. Other CN are normal           Motor:  5/5 X 4. Tone and bulk are normal           Reflexes:  Normal DTR.Toes downgoing.        Sensory:  Normal to PP, LT, vibration and LUCIO                   Coordination:   Intact finger-to-nose and toe-to-finger tests       Gait:  Untestable    Cardiovascular: Regular rate and rhythm, no m/r/g  Lungs: Resp: 19  Both hemithoraces are symmetrical, auscultation of lungs revealed no bronchovesicular sounds, expirium prolongation, wheezing, rhonci and crackles  Abdomen: Soft, not tender, not distended  Skin/Extremities: No clubbing, cyanosis, no edema   Lines: No erythema or discharge at entry site for Arterial Line. Current lines are required for patient management    Medications  Infusions medications:    niCARdipine 40 mg in 200 mL 0.9% NaCl       NaCl Stopped (01/23/17 1813)     - MEDICATION INSTRUCTIONS -       niCARdipine 40 mg in 200 mL 0.9% NaCl Stopped (01/23/17 1752)     lactated ringers 100 mL/hr at 01/23/17 7412     Schedule medications:    niMODipine  60 mg Oral Q4H     sodium chloride (PF)  3 mL Intracatheter Q8H     levETIRAcetam  500 mg Intravenous Q12H     PRN medications:tramadol, labetalol, hydrALAZINE, sodium chloride (PF), - MEDICATION INSTRUCTIONS -, ondansetron **OR** ondansetron, prochlorperazine **OR** prochlorperazine **OR**  prochlorperazine, metoclopramide **OR** metoclopramide, HYDROmorphone, oxyCODONE, naloxone, acetaminophen **OR** acetaminophen  Data      Labotary Data:   All data was reviewed by me personally  CBC RESULTS:  Recent Labs   Lab Test  01/24/17   0455  01/23/17   0915   WBC  11.6*  6.3   RBC  4.01*  4.48   HGB  13.0*  14.4   HCT  36.0*  40.0   PLT  196  180     Basic Metabolic Panel:  Recent Labs   Lab Test  01/24/17   0455  01/23/17   0915   NA  137  140   POTASSIUM  3.6  3.5   CHLORIDE  102  105   CO2  26  29   BUN  6*  12   CR  0.64*  0.68   GLC  132*  124*   RADHA  8.0*  8.6     ABG:No lab results found.  Liver panel:  No lab results found.  INRNo lab results found.   Lipid Profile:  Recent Labs   Lab Test  01/23/17   1800   CHOL  129   HDL  43   LDL  68   TRIG  91     Thyroid Panel:  Recent Labs   Lab Test  01/23/17   1800   TSH  0.61      Vitamin B12: No lab results found.   Vitamin D:No lab results found.  A1C:   Recent Labs   Lab Test  01/23/17   1800   A1C  4.6     Troponin I:   Recent Labs   Lab Test  01/24/17   0455  01/23/17   1800   TROPI  0.095*  0.190*     CPK: No lab results found.  Ammonia: No lab results found.  Serum Osmolality:No lab results found.  Most Recent 6 Bacteria Isolates From Any Culture (See EPIC Reports for Culture Details):No lab results found.  UA Results:  No lab results found.           Imaging:   All imaging studies were reviewed personally  CT head:   1. Extensive subarachnoid hemorrhage in a pattern that suggests  aneurysmal rupture.  2. Mild hydrocephalus.  3. No evidence for ischemic infarct.  CTA neck/head:  --Acom A aneurysm 5 mm  Cerebral angiogram:   Irregular 5.2 x 5 mm anterior communicating artery aneurysm with a broad 3 mm neck and a leftward directed 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   Technically successful coil embolization with complete angiographic closure of the daughter bleb/pseudoaneurysm as the presumed ruptured site, and good coil packing of  the aneurysm proper, with mild persistent interstitial filling.          Time Spent on This Encounter     Time:   Neurocritical care time:  I spent 45 minutes bedside and on the inpatient unit today managing the neurocritical care of Malvin Au in relation to the issues listed in this note. Patient is critically ill / has very high risk of deterioration

## 2017-01-24 NOTE — PROGRESS NOTES
01/24/17 1329   General Information   Onset Date 01/23/17   Start of Care Date 01/24/17   Referring Physician Demetrius Farah MD   Patient Profile Review/OT: Additional Occupational Profile Info See Profile for full history and prior level of function   Patient/Family Goals Statement Pt did not state   Swallowing Evaluation Bedside swallow evaluation   Behaviorial Observations WFL (within functional limits)   Mode of current nutrition NPO   Respiratory Status Room air   Comments History of a headache for about few weeks. 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  He was brought to the ER by EMS.  He has regained consciousness and does not remember the events prior to him being found.  CT suggests extensive SAH suspicious for aneurysm   Clinical Swallow Evaluation   Oral Musculature generally intact   Structural Abnormalities none present   Dentition present and adequate   Mucosal Quality good   Mandibular Strength and Mobility intact   Oral Labial Strength and Mobility WFL   Lingual Strength and Mobility WFL   Velar Elevation intact   Buccal Strength and Mobility intact   Laryngeal Function Throat clear;Cough;Swallow;Voicing initiated   Additional Documentation Yes   Clinical Swallow Eval: Thin Liquid Texture Trial   Mode of Presentation, Thin Liquids spoon;cup;self-fed;fed by clinician   Volume of Liquid or Food Presented 4 oz   Oral Phase of Swallow WFL   Pharyngeal Phase of Swallow intact   Diagnostic Statement Pt tolerated thin liquid trials with no overt s/sx of aspiration    Clinical Swallow Eval: Puree Solid Texture Trial   Mode of Presentation, Puree spoon;self-fed   Volume of Puree Presented 2 tbsp   Oral Phase, Puree WFL   Pharyngeal Phase, Puree intact   Diagnostic Statement Pt tolerated pureed textures trials with no overt s/sx of aspiration    Clinical Swallow Eval: Solid Food Texture Trial   Mode of Presentation, Solid  self-fed   Volume of Solid Food Presented 2 tbsp blueberries   Oral Phase, Solid WFL   Pharyngeal Phase, Solid intact   Diagnostic Statement Pt tolerated regular solid textures with no overt s/sx of aspiration    VFSS Evaluation   VFSS Additional Documentation No   FEES Evaluation   Additional Documentation No   Swallow Compensations   Swallow Compensations Alternate viscosity of consistencies;Pacing;Reduce amounts   Results No difficulties noted   Esophageal Phase of Swallow   Patient reports or presents with symptoms of esophageal dysphagia No   Swallow Eval: Clinical Impressions   Skilled Criteria for Therapy Intervention No problems identified which require skilled intervention   Functional Assessment Scale (FAS) 7   Treatment Diagnosis Normal oropharyngeal swallow skills   Diet texture recommendations Regular diet;Thin liquids   Recommended Feeding/Eating Techniques alternate between small bites and sips of food/liquid;small sips/bites;maintain upright posture during/after eating for 30 mins   Demonstrates Need for Referral to Another Service physical therapy;occupational therapy   Anticipated Discharge Disposition (defer to OT/PT)   Risks and Benefits of Treatment have been explained. Yes   Patient, family and/or staff in agreement with Plan of Care Yes   Clinical Impression Comments SLP: Bedside swallow eval completed per MD orders. Pt oropharyngeal swallow function is within normal limits. Pts oral musculature demonstrates adequate strength, symmetry, and coordination. Pt tolerated all PO trials with no overt s/sx of aspiration and no c/o pharyngeal residue. Recommend pt initiate regular textures (gluten free) and thin liquids. Pt should be fully upright for all PO, take small sips/bites, and pace self. No further ST needs indicated at this time. Will complete ST orders.    Total Evaluation Time   Total Evaluation Time (Minutes) 10

## 2017-01-24 NOTE — PROGRESS NOTES
Right arterial sheath removed. Manual pressure was held for 20 minutes. Site was dry with no hematoma. Sterile guaze and Tegaderm was placed on the site. Pt was given instructions and report was given to Gilda RN. 25 min face to face time with pt.

## 2017-01-24 NOTE — PROGRESS NOTES
[facility]    Hospitalist Progress Note    Date of Service (when I saw the patient): 01/24/2017    Assessment and Plan  Malvin Au is a 41 year old male who was admitted on 1/23/2017.    1. Subarachnoid hemmorhage: donovan grade II, Mcguire-Salmeron grade II SAH secondary to ruptured anterior commincating artery aneurysm  --s/p coiling t.2 mm AcomA aneuyrsm on 1/23/17  --Repeat head CT appears stable. Still has some mild hydrocephalus  PT, OT, SLP consults    2. Hypertensive emergency:   ---patient presently well controlled on nicardipine gtts.. Neurocritical care recommending initial Systolic goal of <180.  --starting nimodipine today.     3. Troponin elevation: likley secondary to #1 and #2. TTE ordered for today.     4. Convulsions Secondary to #1: on keppra.     5. Nausea and vomiting secondary to #1.   Will schedule IV Zofran in addition to continuing compazine, reglan. Also add  IV ativan prn.     6.  Skin lesions: Concern for celiac disease: Wife is concerned that patient has celiac disease as he has other family members with this condition will obtain transglutaminase antibody today.     DVT Prophylaxis: Pneumatic Compression Devices  Code Status: No Order    Disposition: Expected discharge in 4-6 days     Demetrius Farah MD  [phone] (P)  Text Page (7 am to 6 pm)    Interval History  Seen with DUC Heaton    Wife at bedside.    Mr. Au feels ok. Has been having some nausea and vomiting despite receiving reglan and zofran.      Wife is concerned about the possibility of celiac sprue as he has had skin lesions that have improved with stopping gluten.     Discussed current clinical challenges, need for close monitoring and eventually transition to PO antihypertensive regimen. Reviewed Head CT with patient's wife.     Also discussed importance of good BP control as outpatient and need to establish care with a primary MD.     -Data reviewed today: I reviewed all new labs and imaging  results over the last 24 hours. I personally reviewed the head CT showing mild ventriculomegaly due to mild hydrocephalus.     dPhysical Exam  Temp: 98.5  F (36.9  C) Temp src: Oral BP: 141/89 mmHg Pulse: 76 Heart Rate: 72 Resp: 14 SpO2: 97 % O2 Device: None (Room air) Oxygen Delivery: 2 LPM  Filed Vitals:    01/23/17 1133   Weight: 104.327 kg (230 lb)     Vital Signs with Ranges  Temp:  [97.2  F (36.2  C)-98.5  F (36.9  C)] 98.5  F (36.9  C)  Pulse:  [76] 76  Heart Rate:  [] 72  Resp:  [7-31] 14  BP: (117-158)/(60-91) 141/89 mmHg  MAP:  [74 mmHg-114 mmHg] 95 mmHg  Arterial Line BP: ()/(59-91) 122/61 mmHg  SpO2:  [90 %-100 %] 97 %  I/O last 3 completed shifts:  In: 5410 [I.V.:4410; IV Piggyback:1000]  Out: 3525 [Urine:3525]    Constitutional: No acute distress  Respiratory: Clear to auscultation bilaterally, no crackles  Cardiovascular: Regular rate and rhythm, S1, S2, no murmurs  GI: Abdomen soft, nontender, nondistended, bowel sounds are heard  Skin/Integumen: No jaundice, no suspicious rash  Other:      Medications    niCARdipine 40 mg in 200 mL 0.9% NaCl 2.5 mg/hr (01/24/17 0850)     NaCl 1,000 mL (01/24/17 0923)     - MEDICATION INSTRUCTIONS -         niMODipine  60 mg Oral Q4H     ondansetron  4 mg Intravenous Q6H ZACKERY     sodium chloride (PF)  3 mL Intracatheter Q8H     levETIRAcetam  500 mg Intravenous Q12H       Data    Recent Labs  Lab 01/24/17  0455 01/23/17  1800 01/23/17  0915   WBC 11.6*  --  6.3   HGB 13.0*  --  14.4   MCV 90  --  89     --  180     --  140   POTASSIUM 3.6  --  3.5   CHLORIDE 102  --  105   CO2 26  --  29   BUN 6*  --  12   CR 0.64*  --  0.68   ANIONGAP 9  --  6   RADHA 8.0*  --  8.6   *  --  124*   TROPI 0.095* 0.190*  --        Recent Results (from the past 24 hour(s))   CT Head w Contrast    Narrative    CT BRAIN PERFUSION  1/23/2017 10:26 AM    COMPARISON: None.    HISTORY: Stroke symptoms.    TECHNIQUE: Time sequential axial CT images of the head were  acquired  during the administration of intravenous contrast (120 mL Isovue 370).  CTA images of the [place] as well as color perfusion maps of  the brain were created from this time sequential axial source data.    FINDINGS: There are no focal or regional perfusion defects in the  brain.      Impression    IMPRESSION: Normal CT perfusion of the brain.    Radiation dose for this scan was reduced using automated exposure  control, adjustment of the mA and/or kV according to patient size, or  iterative reconstruction technique.    HANS MALDONADO MD   IR Carotid Cerebral Angiogram Bilateral    [facility]   INTERVENTIONAL NEURORADIOLOGY   PROCEDURAL NOTE     1. ENDOVASCULAR COIL EMBOLIZATION OF A RUPTURED ANTERIOR COMMUNICATING  ARTERY ANEURYSM   2. CERVICAL ANGIOGRAM: SELECTIVE CATHETERIZATION OF THE RIGHT COMMON  CAROTID ARTERY AND LEFT COMMON CAROTID ARTERY WITH ANGIOGRAPHIC  IMAGING CENTERED OVER THE NECK   3. CEREBRAL ANGIOGRAM: SELECTIVE CATHETERIZATION OF THE RIGHT INTERNAL  CAROTID ARTERY WITH ANGIOGRAPHIC IMAGING CENTERED OVER THE HEAD   4. CEREBRAL ANGIOGRAM: SELECTIVE CATHETERIZATION OF THE RIGHT  VERTEBRAL ARTERY WITH ANGIOGRAPHIC IMAGING CENTERED OVER THE HEAD   5. THREE-DIMENSIONAL ROTATIONAL ANGIOGRAM OF THE HEAD WITH IMAGE  PROCESSING ON A SEPARATE COMPUTER WORKSTATION: INJECTION OF THE RIGHT  INTERNAL CAROTID ARTERY  6. CEREBRAL ANGIOGRAMS THROUGH EXISTING GUIDE CATHETER TO EVALUATE  ENDOVASCULAR TREATMENT: INJECTIONS OF THE RIGHT INTERNAL CAROTID  ARTERY     INDICATION: Aneurysmal subarachnoid hemorrhage. Angiography performed  in order to further characterize and treat the aneurysm.     CONSENT: Cerebral angiography and endovascular coil embolization were  discussed in detail. The procedures' indications, major risks,  benefits, and alternatives were discussed. Risks including, but not  limited to, pain, intracranial hemorrhage, groin hematoma, arterial  damage, allergic  reaction, renal damage, cardiac and pulmonary  complications of anesthesia, aneurysm rupture, arterial perforation,  stroke and death were discussed. Understanding was acknowledged, and a  signed informed consent was obtained.     ANESTHESIA: The patient was placed under a general endotracheal  anesthesia for this procedure. Anesthesia, including continuos vital  sign monitoring, was administered by the anesthesiology team,  supervised by an anesthesiologist.     ANTICOAGULATION: The patient was anticoagulated with intravenous  heparin for the interventional portion of the procedure. Baseline and  serial activated coagulation times were obtained during the procedure  in order to guide intravenous heparin bolus administration. The first  6,000 unit heparin bolus was administered prior to placement of the  guide catheter in the right internal artery. The activated coagulation  time goal was 250-300 seconds.     SEDATION MONITORING TIME: 0 minutes (procedure performed under general  anesthesia from the start)    MEDICATIONS:   Heparin 14,000 units     CONTRAST: 135 cc Isovue 300 , 20 cc Visipaque 320    FLUOROSCOPIC TIME: 42.2 minutes     AIR KERMA: 4783 mGy     ESTIMATED BLOOD LOSS: Minimal     PERFORMING PHYSICIAN(S):   GABBIE Gupta M.D.    PROCEDURE: The patient was placed supine upon the neuroangiography  table. The skin of both groins was prepped and draped in sterile  fashion. Under local anesthesia with lidocaine and sterile technique,  the right common femoral artery was percutaneously accessed with a 4  Austrian micropuncture set. A short 6 Austrian sheath was then placed. A 6  Austrian Envoy guide catheter was then advanced over an angled Glidewire  into the aortic arch and used to selectively and subselectively  catheterize the following arteries: left common carotid artery, right  common carotid artery, right internal carotid artery, right vertebral  artery. Digital subtraction  angiograms of the head/neck were performed  in multiple projections with these catheterizations. The right  internal carotid artery was injected in order to obtain  three-dimensional rotational angiographic images of the head. The  three-dimensional angiographic source images were transferred to a  separate computer workstation where they were processed with  three-dimensionally enhancing software in order to better characterize  the size, morphology, and relationship to adjacent vessels of an  anterior communicating artery aneurysm.     PRE-TREATMENT ANGIOGRAPHIC FINDINGS:     CRANIAL/CERVICAL CIRCULATION:     Images of the left common carotid artery centered over the neck  demonstrate no stenosis. Images of the left common carotid artery  centered over the head demonstrate no evidence of left anterior  circulation intracranial aneurysm, vascular malformation or stenosis.    Images of the right common carotid artery centered over the neck  demonstrate no stenosis. Images of the right internal carotid artery  centered over the head demonstrate an irregular 5.2 x 5 mm anterior  communicating artery aneurysm with a broad 3 mm neck and a leftward  directed daughter bleb/pseudoaneurysm measuring 2.4 x 2.9 mm. Images  of the right vertebral artery centered over the head demonstrate no  evidence of posterior circulation abnormality.    PROCEDURE CONTINUED: A decision was made to proceed with endovascular  coil embolization of the aneurysm. A 6,000 unit heparin bolus was  administered intravenously. The 6 Hungarian Envoy guide catheter was  advanced over a Glidewire in order to selectively catheterize the  right internal carotid artery. Using fluoroscopic roadmapping  technique as a guide, an SL-10 microcatheter was then advanced over a  Synchro2 Soft microwire until its tip was present in the aneurysm sac.  The following embolization coils were then advanced into the aneurysm  sac and detached in this order: Barricade coil  (4 mm x 10 cm, complex  finish), Barricade coil (3 mm x 10 cm, complex finish). A third  Barricade coil (1.5 mm x 4 cm, complex finish) was additionally  attempted, however not deployed due to concern for excessive  encroachment on the parent vessel. A digital subtraction angiogram was  performed through the guide catheter immediately prior to detachment  of each of these coils. Each time the angiogram showed satisfactory  placement, and the coil was detached. A total of 2 angiograms through  the existing guide catheter were performed in order to assess  progressive degree of aneurysm occlusion and to evaluate for any  evidence of complication.     POST-TREATMENT ANGIOGRAPHIC FINDINGS:     CRANIAL CIRCULATION:     Followup angiogram 1: The first coil is in satisfactory position.  There is no evidence of parent artery compromise. There is no  thromboembolic complication. There is no contrast extravasation.     Followup angiogram 2: The second coil is in satisfactory position.  There is no evidence of parent artery compromise. There is no  thromboembolic complication. There is no contrast extravasation.     After detachment of the final coil, the microcatheter was removed. A  final digital subtraction angiogram of the head was performed. There  was no evidence of thromboembolic complication. The guide catheter was  subsequently removed. The sheath was secured to the skin using suture.  A sterile dressing was applied. The procedure appeared well-tolerated.  There was no apparent complication.     CONCLUSION:     Irregular 5.2 x 5 mm anterior communicating artery aneurysm with a  broad 3 mm neck and a leftward directed 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     Technically successful coil embolization with complete angiographic  closure of the daughter bleb/pseudoaneurysm as the presumed ruptured  site, and good coil packing of the aneurysm proper, with mild  persistent interstitial filling.    Follow-up  angiography in 6 months.    Procedure results were discussed with the patient's family and the  neurointensive care team immediately following the procedure.  _____________________________________________   Terrence Beltran M.D.   Interventional Neuroradiologist   [facility]   Office: [phone]   Pager: [phone]   Cell: [phone]    Terrence Beltran MD   IR Discontinue Sheath    Narrative    This exam was marked as non-reportable because it will not be read by a   radiologist or a [place] non-radiologist provider.             CT Head w/o Contrast    Narrative    CT SCAN OF THE HEAD WITHOUT CONTRAST January 24, 2017 8:40 AM     HISTORY: Subarachnoid hemorrhage.    TECHNIQUE: Axial images of the head and coronal reformations without  IV contrast material. Radiation dose for this scan was reduced using  automated exposure control, adjustment of the mA and/or kV according  to patient size, or iterative reconstruction technique.    COMPARISON: 1/23/2017.    FINDINGS: During the interval, the patient has undergone endovascular  coiling for an anterior communicating artery aneurysm, midline  anterior subarachnoid hemorrhage is again noted. There is also some  hemorrhage in the anterior interhemispheric fissure and in the  dependent portions of the lateral ventricles. There is mild prominence  of the temporal horns similar that seen on the prior study. Mild  ventriculomegaly is also again noted. There is no evidence for any  acute ischemic infarct or any new hemorrhage. There is no evidence for  skull fracture. Retention cysts are noted in the maxillary sinus.      Impression    IMPRESSION: Interval endovascular coiling, otherwise no significant  change. There is some persistent mild ventriculomegaly which is most  likely due to mild hydrocephalus.    PROSPER ENGLISH MD

## 2017-01-24 NOTE — PROGRESS NOTES
"[facility] INTERVENTIONAL NEURORADIOLOGY    SUBJECTIVE: \"Doing all right.\" C/o headache.     OBJECTIVE:   Intake/Output Summary (Last 24 hours) at 01/24/17 0834  Last data filed at 01/24/17 0600   Gross per 24 hour   Intake   5410 ml   Output   3525 ml   Net   1885 ml       Imaging:   TRANSCRANIAL DOPPLER  1/24/2017  Pending     CT SCAN OF THE HEAD WITHOUT CONTRAST  January 24, 2017 8:40 AM  Preliminary result  IMPRESSION: Interval endovascular coiling, otherwise no significant  change. There is some persistent mild ventriculomegaly which is most  likely due to mild hydrocephalus.    ENDOVASCULAR COIL EMBOLIZATION OF A RUPTURED ANTERIOR COMMUNICATING  ARTERY ANEURYSM    CONCLUSION:    Irregular 5.2 x 5 mm anterior communicating artery aneurysm with a  broad 3 mm neck and a leftward directed 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      Technically successful coil embolization with complete angiographic  closure of the daughter bleb/pseudoaneurysm as the presumed ruptured  site, and good coil packing of the aneurysm proper, with mild  persistent interstitial filling.     Follow-up angiography in 6 months.      Labs:   HGB     13.0   1/24/2017  PLT      196   1/24/2017  Recent Labs   Lab Test  01/24/17   0455  01/23/17   0915   NA  137  140   CR  0.64*  0.68         EXAM:   /75 mmHg  Pulse 76  Temp(Src) 98.5  F (36.9  C) (Oral)  Resp 19  Ht 1.829 m (6')  Wt 104.327 kg (230 lb)  BMI 31.19 kg/m2  SpO2 93%  General: alert and no distress   Neuro: Alert, oriented x 4. Speech fluent and clear. Equal  strength. No focal deficits noted.  Skin: Right groin site soft without bleeding or hematoma. Right pedal pulse is 3+.       ASSESSMENT:   SAH, post bleed day 1, secondary to ruptured anterior communicating artery aneurysm  Status post endovascular coil embolization   Mild hydrocephalus  Groin site intact  Neurologically intact   Nausea   Headache      PLAN:   We will follow along   TCD's to monitor for " vasospasm   Nimodipine x 21 days   Monitor and replace electrolytes   Please call our office with any questions - [phone]      Britt Duran, CNP

## 2017-01-24 NOTE — PROVIDER NOTIFICATION
Notified Dr. Reece of elevated troponin. Pt denies CP and SOB at this time. Also notified of emesis with sheath pull and cont HA 5-6/10 after prn dilaudid given. Rec'd order for tylenol. Will cont to monitor.

## 2017-01-24 NOTE — PLAN OF CARE
Problem: Goal Outcome Summary  Goal: Goal Outcome Summary  Outcome: No Change  Neuros intact this shift. Cont to have HA throughout noc rating 8/10 remained 5-6/10 after dilaudid. Pt had emesis during sheath pull and 2 other times during noc, given antiemetics with relief. Unable to give oxycodone for pain d/t N/V so has been receiving diluadid every 2-2 1/2 hours through noc for C/O HA. Cont to monitor.

## 2017-01-24 NOTE — PLAN OF CARE
Problem: Goal Outcome Summary  Goal: Goal Outcome Summary  SLP: Attempted to see pt to complete bedside swallow eval; however pt sleeping soundly and per chart review pt with nausea. Will re-attempt this PM as appropriate, otherwise will follow up tomorrow.

## 2017-01-24 NOTE — PROGRESS NOTES
HOSPITALIST SERVICE CROSS-COVER NOTE:    Called by RN for elevated troponin I at 0.190. Patient has no cp or acute ischemic changes on EKG. Suspect due to subarachnoid hemorrhage. No need to repeat TnI level.    Vicky Reece MD  Hospitalist  Pager # [phone]

## 2017-01-24 NOTE — PLAN OF CARE
Problem: Goal Outcome Summary  Goal: Goal Outcome Summary  OT: Eval complete and Tx initiated. Admitted for subarachnoid hemorrhage and seen following cerebral angio with coiling of ruptured aneurysm. Prior to admit, pt lives in house with wife and two daughters and reports I with ADL/IADLs, including driving, work, and household mgmt. Currently, pt requires SBA for functional transfers and ADLs. Pt limited by impaired safety, pain, and decreased activity tolerance. Pt would benefit from ongoing OT/PT intervention upon transfer to station 73 to ensure safety with ADL/IADLs. Anticipate discharge home with increased A from family for IADLs initially.

## 2017-01-25 ENCOUNTER — APPOINTMENT (OUTPATIENT)
Dept: ULTRASOUND IMAGING | Facility: CLINIC | Age: 42
DRG: 021 | End: 2017-01-25
Attending: PSYCHIATRY & NEUROLOGY
Payer: COMMERCIAL

## 2017-01-25 LAB
KCT BLD-ACNC: 127 SEC (ref 105–167)
KCT BLD-ACNC: 185 SEC (ref 105–167)
KCT BLD-ACNC: 202 SEC (ref 105–167)
KCT BLD-ACNC: 224 SEC (ref 105–167)
KCT BLD-ACNC: 229 SEC (ref 105–167)
VIT B12 SERPL-MCNC: 285 PG/ML (ref 193–986)
VIT B12 SERPL-MCNC: NORMAL PG/ML (ref 193–986)

## 2017-01-25 PROCEDURE — 93886 INTRACRANIAL COMPLETE STUDY: CPT

## 2017-01-25 PROCEDURE — 25000125 ZZHC RX 250: Performed by: RADIOLOGY

## 2017-01-25 PROCEDURE — 25000128 H RX IP 250 OP 636: Performed by: EMERGENCY MEDICINE

## 2017-01-25 PROCEDURE — 25000132 ZZH RX MED GY IP 250 OP 250 PS 637: Performed by: PSYCHIATRY & NEUROLOGY

## 2017-01-25 PROCEDURE — 25000128 H RX IP 250 OP 636: Performed by: INTERNAL MEDICINE

## 2017-01-25 PROCEDURE — 25000132 ZZH RX MED GY IP 250 OP 250 PS 637: Performed by: RADIOLOGY

## 2017-01-25 PROCEDURE — 20000003 ZZH R&B ICU

## 2017-01-25 PROCEDURE — 25000132 ZZH RX MED GY IP 250 OP 250 PS 637: Performed by: INTERNAL MEDICINE

## 2017-01-25 PROCEDURE — 25000125 ZZHC RX 250: Performed by: INTERNAL MEDICINE

## 2017-01-25 PROCEDURE — 99232 SBSQ HOSP IP/OBS MODERATE 35: CPT | Performed by: INTERNAL MEDICINE

## 2017-01-25 RX ORDER — LEVETIRACETAM 500 MG/1
500 TABLET ORAL 2 TIMES DAILY
Status: DISCONTINUED | OUTPATIENT
Start: 2017-01-25 | End: 2017-02-01

## 2017-01-25 RX ADMIN — SODIUM CHLORIDE 1000 ML: 9 INJECTION, SOLUTION INTRAVENOUS at 13:31

## 2017-01-25 RX ADMIN — SODIUM CHLORIDE 1000 ML: 9 INJECTION, SOLUTION INTRAVENOUS at 02:01

## 2017-01-25 RX ADMIN — NIMODIPINE 60 MG: 30 CAPSULE ORAL at 02:01

## 2017-01-25 RX ADMIN — OXYCODONE HYDROCHLORIDE 10 MG: 5 TABLET ORAL at 22:04

## 2017-01-25 RX ADMIN — NIMODIPINE 60 MG: 30 CAPSULE ORAL at 05:59

## 2017-01-25 RX ADMIN — TRAMADOL HYDROCHLORIDE 50 MG: 50 TABLET, COATED ORAL at 10:55

## 2017-01-25 RX ADMIN — LEVETIRACETAM 500 MG: 500 TABLET, FILM COATED ORAL at 22:04

## 2017-01-25 RX ADMIN — ACETAMINOPHEN 650 MG: 325 TABLET, FILM COATED ORAL at 22:04

## 2017-01-25 RX ADMIN — SODIUM CHLORIDE 1000 ML: 9 INJECTION, SOLUTION INTRAVENOUS at 21:37

## 2017-01-25 RX ADMIN — PROCHLORPERAZINE EDISYLATE 10 MG: 5 INJECTION INTRAMUSCULAR; INTRAVENOUS at 01:10

## 2017-01-25 RX ADMIN — NIMODIPINE 60 MG: 30 CAPSULE ORAL at 18:54

## 2017-01-25 RX ADMIN — OXYCODONE HYDROCHLORIDE 10 MG: 5 TABLET ORAL at 05:59

## 2017-01-25 RX ADMIN — NIMODIPINE 60 MG: 30 CAPSULE ORAL at 14:54

## 2017-01-25 RX ADMIN — ACETAMINOPHEN 650 MG: 325 TABLET, FILM COATED ORAL at 16:51

## 2017-01-25 RX ADMIN — OXYCODONE HYDROCHLORIDE 10 MG: 5 TABLET ORAL at 16:51

## 2017-01-25 RX ADMIN — OXYCODONE HYDROCHLORIDE 10 MG: 5 TABLET ORAL at 01:10

## 2017-01-25 RX ADMIN — ONDANSETRON HYDROCHLORIDE 4 MG: 2 INJECTION, SOLUTION INTRAMUSCULAR; INTRAVENOUS at 13:36

## 2017-01-25 RX ADMIN — NIMODIPINE 60 MG: 30 CAPSULE ORAL at 10:44

## 2017-01-25 RX ADMIN — ONDANSETRON HYDROCHLORIDE 4 MG: 2 INJECTION, SOLUTION INTRAMUSCULAR; INTRAVENOUS at 18:54

## 2017-01-25 RX ADMIN — ONDANSETRON HYDROCHLORIDE 4 MG: 2 INJECTION, SOLUTION INTRAMUSCULAR; INTRAVENOUS at 05:59

## 2017-01-25 RX ADMIN — TRAMADOL HYDROCHLORIDE 50 MG: 50 TABLET, COATED ORAL at 20:41

## 2017-01-25 RX ADMIN — NIMODIPINE 60 MG: 30 CAPSULE ORAL at 22:04

## 2017-01-25 RX ADMIN — LEVETIRACETAM 500 MG: 100 INJECTION, SOLUTION INTRAVENOUS at 10:44

## 2017-01-25 ASSESSMENT — VISUAL ACUITY
OU: NORMAL ACUITY

## 2017-01-25 ASSESSMENT — PAIN DESCRIPTION - DESCRIPTORS
DESCRIPTORS: HEADACHE

## 2017-01-25 NOTE — PROGRESS NOTES
[facility]    Hospitalist Progress Note    Date of Service (when I saw the patient): 01/25/2017    Assessment and Plan  Malvin Au is a 41 year old male who was admitted on 1/23/2017.    1. Subarachnoid hemmorhage: donovan grade II, Mcguire-Salmeron grade II SAH secondary to ruptured anterior commincating artery aneurysm  --s/p coiling t.2 mm AcomA aneuyrsm on 1/23/17  --Doing well overall.   PT, OT, SLP consulted    2. Hypertensive emergency:   ---patient has been weaned of nicardipine gtts. Now on nimodipine.. Neurocritical care recommending initial Systolic goal of <180.    3. Troponin elevation: likley secondary to #1 and #2. TTE shows mild LVH, negative bubble study.     4. Convulsions Secondary to #1: on keppra.     5. Nausea and vomiting secondary to #1.   Continue IV ativan and zofran.     6.  Skin lesions: Concern for celiac disease: Wife is concerned that patient has celiac disease as he has other family members with this condition    Transglutaminase antibody pending    DVT Prophylaxis: Pneumatic Compression Devices  Code Status: Full Code    Disposition: Expected discharge in 4-6 days     Demetrius Farah MD  [phone] (P)  Text Page (7 am to 6 pm)    Interval History  Seen with DUC Heaton    Wife at bedside.    Overall doing better. A bit somnolent at the moment, but has been conversing well with wife and staff.     Denies complaints at this time.      -Data reviewed today: I reviewed all new labs and imaging results over the last 24 hours    dPhysical Exam  Temp: 98.9  F (37.2  C) Temp src: Oral BP: (!) 169/92 mmHg   Heart Rate: 73 Resp: 20 SpO2: 96 % O2 Device: None (Room air)    Filed Vitals:    01/23/17 1133   Weight: 104.327 kg (230 lb)     Vital Signs with Ranges  Temp:  [98  F (36.7  C)-98.9  F (37.2  C)] 98.9  F (37.2  C)  Heart Rate:  [69-90] 73  Resp:  [13-25] 20  BP: (145-169)/() 169/92 mmHg  SpO2:  [90 %-99 %] 96 %  I/O last 3 completed shifts:  In: 3405 [P.O.:1030;  I.V.:2375]  Out: 2950 [Urine:2950]    Constitutional: No acute distress  HEENT: no facial asymmetry. EOMI  Respiratory: Clear to auscultation bilaterally, no crackles  Cardiovascular: Regular rate and rhythm, S1, S2, no murmurs  GI: Abdomen soft, nontender, nondistended, bowel sounds are heard  Skin/Integumen: No jaundice, no suspicious rash  NEURO: no grross focal deficits.     Medications    niCARdipine 40 mg in 200 mL 0.9% NaCl Stopped (01/24/17 1021)     NaCl 1,000 mL (01/25/17 1331)     - MEDICATION INSTRUCTIONS -         levETIRAcetam  500 mg Oral BID     niMODipine  60 mg Oral Q4H     ondansetron  4 mg Intravenous Q6H ZACKERY     sodium chloride (PF)  3 mL Intracatheter Q8H       Data    Recent Labs  Lab 01/24/17  2325 01/24/17  1724 01/24/17  1135 01/24/17  0455  01/23/17  0915   WBC  --   --   --  11.6*  --  6.3   HGB  --   --   --  13.0*  --  14.4   MCV  --   --   --  90  --  89   PLT  --   --   --  196  --  180   NA  --   --   --  137  --  140   POTASSIUM  --   --   --  3.6  --  3.5   CHLORIDE  --   --   --  102  --  105   CO2  --   --   --  26  --  29   BUN  --   --   --  6*  --  12   CR  --   --   --  0.64*  --  0.68   ANIONGAP  --   --   --  9  --  6   RADHA  --   --   --  8.0*  --  8.6   GLC  --   --   --  132*  --  124*   TROPI 0.084* 0.062* 0.095* 0.095*  < >  --    < > = values in this interval not displayed.    Recent Results (from the past 24 hour(s))   US Transcranial Doppler Complete Port    Narrative    TRANSCRANIAL DOPPLER ULTRASOUND   1/25/2017 9:01 AM     HISTORY: Subarachnoid hemorrhage from anterior communicating artery  aneurysm that has now been coiled.    COMPARISON: None.    FINDINGS:  Mean velocities are as follows:    Right M1:   38 cm/sec; pulsatility index: 1.2; Lindegaard index: 1.1   Right A1:    26 cm/sec.  Right PCA: 26 cm/sec.  Right ICA:   33 cm/sec.     Left M1:   43 cm/sec; pulsatility index: 1.3; Lindegaard index: 1.3   Left A1:    34 cm/sec.  Left PCA: 30 cm/sec.  Left ICA:    32 cm/sec.     Basilar:  24 cm/s      Impression    IMPRESSION: No evidence of vasospasm.    CLAIRE GANDHI MD

## 2017-01-25 NOTE — PROGRESS NOTES
[facility]  Neuroscience and Spine [place]  Neuro-ICU Progress Note       Admission Date: 1/23/2017  Date of Service:01/25/2017   Hospital Day: 3   _________________________________     Main Plans for Today  --continue supportive care  --daily TCDs   Assessment and Plan  #. (I60.9) Subarachnoid hemorrhage (H)  (primary encounter diagnosis)  --Arevalo grade III, Mcguire-Salmeron grade II SAH  --Secondary to ruptured anterior communicating artery aneurysm   --Repeat CT head for monitoring of mild hydrocephalus  --no evidence of spasm on TCD, continue daily  #. (R56.9) Convulsions, unspecified convulsion type (H)  --Seizure on the onset.   --Keppra for a week  #. (I10) Malignant essential hypertension  --Keep BP <180 mm Hg  #. (I60.9) Cerebral aneurysm rupture (H)  --Coiled 5.2 mm AcomA aneurysm 1/23/17  #. Cardiovascular  --Minimal elevation of troponin  --Echo unremarkable  #. Infection   --Mild elevation in WBC, reactive  #. Endocrine:   --Insulin protocol to keep blood sugars 100-150.   #. Heme/Onc:   --No acute issues  #. Renal  --Sodium goal 140-155  #. Skin/Musculoskeletal:  --No issues   #. PT/OT/Speech  --continue evaluations   #. Nutrition  --Per speech therapy evaluation   #. 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 Upon EMS arrival, he was drooling, hypertensive, and remained unresponsive. No obvious trauma or seizure activity. He was incontinent. Upon waking was confused and appeared postictal. He was found to have left leg weakness and code stroke was called. Imaging demonstrated SAH likely from AcomA aneurysm. Aneurysm coiled on 1/23/17.   Headache continues. No seizures. No weakness.     Physical Exam    Vitals: Blood pressure 169/92, pulse 76, temperature 98.9  F (37.2  C), temperature source  Oral, resp. rate 20, height 1.829 m (6'), weight 104.327 kg (230 lb), SpO2 96 %.  Tmax: Temp (24hrs), Av.8  F (36.6  C), Min:97.2  F (36.2  C), Max:98.5  F (36.9  C)    Vital Signs with Ranges  Temp:  [98  F (36.7  C)-98.9  F (37.2  C)] 98.9  F (37.2  C)  Heart Rate:  [69-90] 73  Resp:  [11-25] 20  BP: (139-169)/() 169/92 mmHg  SpO2:  [90 %-99 %] 96 %   I&O:  I/O this shift:  In: 375 [I.V.:375]  Out: 450 [Urine:450]  I/O last 3 completed shifts:  In: 4247.49 [P.O.:1330; I.V.:2917.49]  Out: 3850 [Urine:3700; Emesis/NG output:150] I/O since admission: +2207.49         General Appearance:   No acute distress  Neuro:       Mental Status Exam:   Awake, alert, oriented X3. Speech and language are intact. Mental status is normal       Cranial Nerves:  Pupils 3 mm, reactive. EOMI. Face sensation is normal. Face is symmetric. Tongue and uvula are midline. Other CN are normal           Motor:  5/5 X 4. Tone and bulk are normal           Reflexes:  Normal DTR. Toes downgoing.        Sensory:  Normal to light touch                  Coordination:   Intact finger-to-nose        Gait:  Untestable    Cardiovascular: Regular rate and rhythm, no m/r/g  Lungs: Resp: 20  Both hemithoraces are symmetrical, auscultation of lungs revealed no bronchovesicular sounds, expirium prolongation, wheezing, rhonci and crackles  Abdomen: Soft, not tender, not distended  Skin/Extremities: No clubbing, cyanosis, no edema   Lines: No erythema or discharge at entry site for Arterial Line. Current lines are required for patient management    Medications  Infusions medications:    niCARdipine 40 mg in 200 mL 0.9% NaCl Stopped (17 1021)     NaCl 1,000 mL (17 0201)     - MEDICATION INSTRUCTIONS -       Schedule medications:    levETIRAcetam  500 mg Oral BID     niMODipine  60 mg Oral Q4H     ondansetron  4 mg Intravenous Q6H ZACKERY     sodium chloride (PF)  3 mL Intracatheter Q8H     PRN medications:tramadol, labetalol, hydrALAZINE,  LORazepam, sodium chloride (PF), - MEDICATION INSTRUCTIONS -, ondansetron **OR** ondansetron, prochlorperazine **OR** prochlorperazine **OR** prochlorperazine, metoclopramide **OR** metoclopramide, HYDROmorphone, oxyCODONE, naloxone, acetaminophen **OR** acetaminophen  Data      Labotary Data:   All data was reviewed by me personally  CBC RESULTS:  Recent Labs   Lab Test  01/24/17   0455  01/23/17   0915   WBC  11.6*  6.3   RBC  4.01*  4.48   HGB  13.0*  14.4   HCT  36.0*  40.0   PLT  196  180     Basic Metabolic Panel:  Recent Labs   Lab Test  01/24/17   0455 01/23/17   0915   NA  137  140   POTASSIUM  3.6  3.5   CHLORIDE  102  105   CO2  26  29   BUN  6*  12   CR  0.64*  0.68   GLC  132*  124*   RADHA  8.0*  8.6     Lipid Profile:  Recent Labs   Lab Test  01/23/17   1800   CHOL  129   HDL  43   LDL  68   TRIG  91     Thyroid Panel:  Recent Labs   Lab Test  01/23/17   1800   TSH  0.61      Vitamin B12:   Recent Labs   Lab Test  01/24/17   1135   B12  Quantity not sufficient      Vitamin D:  Recent Labs   Lab Test  01/23/17   1800   VITDT  36     A1C:   Recent Labs   Lab Test  01/23/17   1800   A1C  4.6     Troponin I:   Recent Labs   Lab Test  01/24/17   2325  01/24/17   1724  01/24/17   1135  01/24/17   0455  01/23/17   1800   TROPI  0.084*  0.062*  0.095*  0.095*  0.190*            Imaging:   All imaging studies were reviewed personally  CT head 1/23/17:   1. Extensive subarachnoid hemorrhage in a pattern that suggests aneurysmal rupture.  2. Mild hydrocephalus.  3. No evidence for ischemic infarct.    CTP head 1/23/17:  --Normal CT perfusion of the brain.    Cerebral angiogram:   --Irregular 5.2 x 5 mm anterior communicating artery aneurysm with a broad 3 mm neck and a leftward directed daughter bleb/pseudoaneurysm measuring 2.4 x 2.9 mm.  Technically successful coil embolization with complete angiographic closure of the daughter bleb/pseudoaneurysm as the presumed ruptured site, and good coil packing of the  aneurysm proper, with mild persistent interstitial filling.    CT head 1/24/17:  --Interval endovascular coiling, otherwise no significant change. There is some persistent mild ventriculomegaly which is most likely due to mild hydrocephalus.    TCD 1/25/17:  --No evidence of vasospasm.        Time Spent on This Encounter     Time:   Neurocritical care time:  I spent 45 minutes bedside and on the inpatient unit today managing the neurocritical care of Malvin Au in relation to the issues listed in this note. Patient has very high risk of deterioration      Abigail Reyes, FNP-BC

## 2017-01-25 NOTE — PLAN OF CARE
Problem: Goal Outcome Summary  Goal: Goal Outcome Summary  Outcome: No Change  A&Ox4, neurologically unchanged. VSS on RA, c/o HA relieved somewhat with PRN oxycodone and dilaudid. Will continue to monitor.

## 2017-01-25 NOTE — PLAN OF CARE
Problem: Stroke (Hemorrhagic) (Adult)  Goal: Signs and Symptoms of Listed Potential Problems Will be Absent or Manageable (Stroke)  Signs and symptoms of listed potential problems will be absent or manageable by discharge/transition of care (reference Stroke (Hemorrhagic) (Adult) CPG).   Outcome: Improving  [facility]   Intensive Care Unit   Nursing Note    Vital signs stable during the day.  PERRL. Neuros intact. No deficits noted. Moves all extremities.  Follows commands.  Pt is on room air.  Labs noted. Spouse has been updated x 2 to POC and medications. Continue to monitor closely.          Lab Results   Component Value Date     TROPI 0.062* 01/24/2017     TROPI 0.095* 01/24/2017     TROPI 0.095* 01/24/2017     TROPI 0.190* 01/23/2017       ROUTINE IP LABS (Last four results)  BMP  Recent Labs  Lab 01/24/17  0455 01/23/17  0915    140   POTASSIUM 3.6 3.5   CHLORIDE 102 105   RADHA 8.0* 8.6   CO2 26 29   BUN 6* 12   CR 0.64* 0.68   * 124*     CBC  Recent Labs  Lab 01/24/17  0455 01/23/17  0915   WBC 11.6* 6.3   RBC 4.01* 4.48   HGB 13.0* 14.4   HCT 36.0* 40.0   MCV 90 89   MCH 32.4 32.1   MCHC 36.1 36.0   RDW 12.4 12.4    180     Sudarshan Perez RN

## 2017-01-26 ENCOUNTER — APPOINTMENT (OUTPATIENT)
Dept: ULTRASOUND IMAGING | Facility: CLINIC | Age: 42
DRG: 021 | End: 2017-01-26
Attending: PSYCHIATRY & NEUROLOGY
Payer: COMMERCIAL

## 2017-01-26 LAB
ANION GAP SERPL CALCULATED.3IONS-SCNC: 7 MMOL/L (ref 3–14)
BUN SERPL-MCNC: 10 MG/DL (ref 7–30)
CALCIUM SERPL-MCNC: 8.4 MG/DL (ref 8.5–10.1)
CHLORIDE SERPL-SCNC: 95 MMOL/L (ref 94–109)
CO2 SERPL-SCNC: 25 MMOL/L (ref 20–32)
CREAT SERPL-MCNC: 0.58 MG/DL (ref 0.66–1.25)
ERYTHROCYTE [DISTWIDTH] IN BLOOD BY AUTOMATED COUNT: 12.5 % (ref 10–15)
GFR SERPL CREATININE-BSD FRML MDRD: ABNORMAL ML/MIN/1.7M2
GLUCOSE SERPL-MCNC: 115 MG/DL (ref 70–99)
HCT VFR BLD AUTO: 37 % (ref 40–53)
HGB BLD-MCNC: 13.7 G/DL (ref 13.3–17.7)
MAGNESIUM SERPL-MCNC: 2.1 MG/DL (ref 1.6–2.3)
MCH RBC QN AUTO: 32.5 PG (ref 26.5–33)
MCHC RBC AUTO-ENTMCNC: 37 G/DL (ref 31.5–36.5)
MCV RBC AUTO: 88 FL (ref 78–100)
OSMOLALITY SERPL: 266 MMOL/KG (ref 275–295)
OSMOLALITY SERPL: 268 MMOL/KG (ref 275–295)
OSMOLALITY SERPL: 276 MMOL/KG (ref 275–295)
PLATELET # BLD AUTO: 207 10E9/L (ref 150–450)
POTASSIUM SERPL-SCNC: 3.6 MMOL/L (ref 3.4–5.3)
RBC # BLD AUTO: 4.21 10E12/L (ref 4.4–5.9)
SODIUM SERPL-SCNC: 127 MMOL/L (ref 133–144)
SODIUM SERPL-SCNC: 127 MMOL/L (ref 133–144)
SODIUM SERPL-SCNC: 129 MMOL/L (ref 133–144)
SODIUM SERPL-SCNC: 130 MMOL/L (ref 133–144)
TTG IGA SER-ACNC: 1 U/ML
TTG IGG SER-ACNC: NORMAL U/ML
WBC # BLD AUTO: 13.3 10E9/L (ref 4–11)

## 2017-01-26 PROCEDURE — 81291 MTHFR GENE: CPT | Performed by: PSYCHIATRY & NEUROLOGY

## 2017-01-26 PROCEDURE — 80048 BASIC METABOLIC PNL TOTAL CA: CPT | Performed by: INTERNAL MEDICINE

## 2017-01-26 PROCEDURE — 25000132 ZZH RX MED GY IP 250 OP 250 PS 637: Performed by: INTERNAL MEDICINE

## 2017-01-26 PROCEDURE — 99232 SBSQ HOSP IP/OBS MODERATE 35: CPT | Performed by: INTERNAL MEDICINE

## 2017-01-26 PROCEDURE — 25000128 H RX IP 250 OP 636: Performed by: EMERGENCY MEDICINE

## 2017-01-26 PROCEDURE — 25000125 ZZHC RX 250: Performed by: HOSPITALIST

## 2017-01-26 PROCEDURE — 85027 COMPLETE CBC AUTOMATED: CPT | Performed by: PSYCHIATRY & NEUROLOGY

## 2017-01-26 PROCEDURE — 84295 ASSAY OF SERUM SODIUM: CPT | Performed by: PSYCHIATRY & NEUROLOGY

## 2017-01-26 PROCEDURE — 20000003 ZZH R&B ICU

## 2017-01-26 PROCEDURE — 25000132 ZZH RX MED GY IP 250 OP 250 PS 637: Performed by: RADIOLOGY

## 2017-01-26 PROCEDURE — 36415 COLL VENOUS BLD VENIPUNCTURE: CPT | Performed by: PSYCHIATRY & NEUROLOGY

## 2017-01-26 PROCEDURE — 27210995 ZZH RX 272: Performed by: PSYCHIATRY & NEUROLOGY

## 2017-01-26 PROCEDURE — 25000125 ZZHC RX 250: Performed by: PSYCHIATRY & NEUROLOGY

## 2017-01-26 PROCEDURE — 25000132 ZZH RX MED GY IP 250 OP 250 PS 637: Performed by: PSYCHIATRY & NEUROLOGY

## 2017-01-26 PROCEDURE — 93886 INTRACRANIAL COMPLETE STUDY: CPT

## 2017-01-26 PROCEDURE — 83930 ASSAY OF BLOOD OSMOLALITY: CPT | Performed by: PSYCHIATRY & NEUROLOGY

## 2017-01-26 PROCEDURE — 25000125 ZZHC RX 250: Performed by: RADIOLOGY

## 2017-01-26 PROCEDURE — 84295 ASSAY OF SERUM SODIUM: CPT | Performed by: INTERNAL MEDICINE

## 2017-01-26 PROCEDURE — 83735 ASSAY OF MAGNESIUM: CPT | Performed by: INTERNAL MEDICINE

## 2017-01-26 PROCEDURE — 27210995 ZZH RX 272: Performed by: HOSPITALIST

## 2017-01-26 PROCEDURE — 25000125 ZZHC RX 250: Performed by: INTERNAL MEDICINE

## 2017-01-26 PROCEDURE — 36415 COLL VENOUS BLD VENIPUNCTURE: CPT | Performed by: INTERNAL MEDICINE

## 2017-01-26 RX ORDER — 3% SODIUM CHLORIDE 3 G/100ML
INJECTION, SOLUTION INTRAVENOUS CONTINUOUS
Status: DISCONTINUED | OUTPATIENT
Start: 2017-01-26 | End: 2017-01-26 | Stop reason: ALTCHOICE

## 2017-01-26 RX ADMIN — OXYCODONE HYDROCHLORIDE 10 MG: 5 TABLET ORAL at 22:09

## 2017-01-26 RX ADMIN — NIMODIPINE 60 MG: 30 CAPSULE ORAL at 01:58

## 2017-01-26 RX ADMIN — OXYCODONE HYDROCHLORIDE 10 MG: 5 TABLET ORAL at 12:37

## 2017-01-26 RX ADMIN — ONDANSETRON HYDROCHLORIDE 4 MG: 2 INJECTION, SOLUTION INTRAMUSCULAR; INTRAVENOUS at 06:20

## 2017-01-26 RX ADMIN — OXYCODONE HYDROCHLORIDE 10 MG: 5 TABLET ORAL at 06:24

## 2017-01-26 RX ADMIN — NIMODIPINE 60 MG: 30 CAPSULE ORAL at 06:20

## 2017-01-26 RX ADMIN — LORAZEPAM 1 MG: 2 INJECTION INTRAMUSCULAR; INTRAVENOUS at 22:58

## 2017-01-26 RX ADMIN — NIMODIPINE 60 MG: 30 CAPSULE ORAL at 10:40

## 2017-01-26 RX ADMIN — Medication 1000 MCG: at 09:11

## 2017-01-26 RX ADMIN — ONDANSETRON HYDROCHLORIDE 4 MG: 2 INJECTION, SOLUTION INTRAMUSCULAR; INTRAVENOUS at 17:58

## 2017-01-26 RX ADMIN — SODIUM CHLORIDE: 234 INJECTION INTRAMUSCULAR; INTRAVENOUS; SUBCUTANEOUS at 20:52

## 2017-01-26 RX ADMIN — ACETAMINOPHEN 650 MG: 325 TABLET, FILM COATED ORAL at 12:37

## 2017-01-26 RX ADMIN — OXYCODONE HYDROCHLORIDE 10 MG: 5 TABLET ORAL at 17:01

## 2017-01-26 RX ADMIN — HYDROMORPHONE HYDROCHLORIDE 0.2 MG: 1 INJECTION, SOLUTION INTRAMUSCULAR; INTRAVENOUS; SUBCUTANEOUS at 00:28

## 2017-01-26 RX ADMIN — ACETAMINOPHEN 650 MG: 325 TABLET, FILM COATED ORAL at 22:09

## 2017-01-26 RX ADMIN — SODIUM CHLORIDE: 3 INJECTION, SOLUTION INTRAVENOUS at 06:21

## 2017-01-26 RX ADMIN — ONDANSETRON HYDROCHLORIDE 4 MG: 2 INJECTION, SOLUTION INTRAMUSCULAR; INTRAVENOUS at 11:29

## 2017-01-26 RX ADMIN — ACETAMINOPHEN 650 MG: 325 TABLET, FILM COATED ORAL at 06:24

## 2017-01-26 RX ADMIN — ONDANSETRON HYDROCHLORIDE 4 MG: 2 INJECTION, SOLUTION INTRAMUSCULAR; INTRAVENOUS at 00:25

## 2017-01-26 RX ADMIN — NIMODIPINE 60 MG: 30 CAPSULE ORAL at 22:09

## 2017-01-26 RX ADMIN — NIMODIPINE 60 MG: 30 CAPSULE ORAL at 14:49

## 2017-01-26 RX ADMIN — ACETAMINOPHEN 650 MG: 325 TABLET, FILM COATED ORAL at 17:01

## 2017-01-26 RX ADMIN — SODIUM CHLORIDE 1000 ML: 9 INJECTION, SOLUTION INTRAVENOUS at 06:22

## 2017-01-26 RX ADMIN — HYDROMORPHONE HYDROCHLORIDE 0.2 MG: 1 INJECTION, SOLUTION INTRAMUSCULAR; INTRAVENOUS; SUBCUTANEOUS at 22:43

## 2017-01-26 RX ADMIN — NIMODIPINE 60 MG: 30 CAPSULE ORAL at 17:58

## 2017-01-26 RX ADMIN — LEVETIRACETAM 500 MG: 500 TABLET, FILM COATED ORAL at 09:11

## 2017-01-26 RX ADMIN — PROCHLORPERAZINE EDISYLATE 10 MG: 5 INJECTION INTRAMUSCULAR; INTRAVENOUS at 22:58

## 2017-01-26 RX ADMIN — SODIUM CHLORIDE: 234 INJECTION INTRAMUSCULAR; INTRAVENOUS; SUBCUTANEOUS at 09:21

## 2017-01-26 RX ADMIN — HYDROMORPHONE HYDROCHLORIDE 0.2 MG: 1 INJECTION, SOLUTION INTRAMUSCULAR; INTRAVENOUS; SUBCUTANEOUS at 15:38

## 2017-01-26 RX ADMIN — LEVETIRACETAM 500 MG: 500 TABLET, FILM COATED ORAL at 22:09

## 2017-01-26 ASSESSMENT — PAIN DESCRIPTION - DESCRIPTORS
DESCRIPTORS: HEADACHE

## 2017-01-26 ASSESSMENT — VISUAL ACUITY
OU: NORMAL ACUITY

## 2017-01-26 NOTE — PLAN OF CARE
Problem: Goal Outcome Summary  Goal: Goal Outcome Summary  PT-Discussed with nurse. Patient tolerating some increased activity (sitting up in chair) but still nauseated and had emesis with this so nurse and therapist agree continuing to hold PT until patient able to tolerate activity better is best approach. Will continue checking with nurse daily.

## 2017-01-26 NOTE — PLAN OF CARE
Problem: Stroke (Hemorrhagic) (Adult)  Goal: Signs and Symptoms of Listed Potential Problems Will be Absent or Manageable (Stroke)  Signs and symptoms of listed potential problems will be absent or manageable by discharge/transition of care (reference Stroke (Hemorrhagic) (Adult) CPG).   Outcome: No Change  [facility]   Intensive Care Unit   Nursing Note    Vital signs stable during the day.  PERRL.  Moves all extremities.  Follows commands.  Room air.  Pt in the chair 2 hours today.  No appetite. Labs noted. Updated Spouse x 2 to POC and medications. Continue to monitor closely.        Lab Results   Component Value Date     TROPI 0.084* 01/24/2017     TROPI 0.062* 01/24/2017     TROPI 0.095* 01/24/2017     TROPI 0.095* 01/24/2017     TROPI 0.190* 01/23/2017       ROUTINE IP LABS (Last four results)  BMP  Recent Labs  Lab 01/24/17  0455 01/23/17  0915    140   POTASSIUM 3.6 3.5   CHLORIDE 102 105   RADHA 8.0* 8.6   CO2 26 29   BUN 6* 12   CR 0.64* 0.68   * 124*     CBC  Recent Labs  Lab 01/24/17  0455 01/23/17  0915   WBC 11.6* 6.3   RBC 4.01* 4.48   HGB 13.0* 14.4   HCT 36.0* 40.0   MCV 90 89   MCH 32.4 32.1   MCHC 36.1 36.0   RDW 12.4 12.4    180     Sudarshan Perez RN

## 2017-01-26 NOTE — PROGRESS NOTES
[facility]  Neuroscience and Spine [place]  Neuro-ICU Progress Note       Admission Date: 1/23/2017  Date of Service:01/26/2017   Hospital Day: 4   _________________________________     Main Plans for Today  --continue supportive care  --daily TCDs   Assessment and Plan  #. (I60.9) Subarachnoid hemorrhage (H)  (primary encounter diagnosis)  --Arevalo grade III, Mcguire-Salmeron grade II SAH  --Secondary to ruptured anterior communicating artery aneurysm   --Repeat CT head for monitoring of mild hydrocephalus  --no evidence of spasm on TCD 1/25/17, continue daily  #. (R56.9) Convulsions, unspecified convulsion type (H)  --Seizure on the onset.   --Keppra for a week  #. (I10) Malignant essential hypertension  --Keep BP <180 mm Hg  #. (I60.9) Cerebral aneurysm rupture (H)  --Coiled 5.2 mm AcomA aneurysm 1/23/17  (E87.1) Hyponatremia  --Significant Na drop to 127  --Usually a precursor for vasospasm  --Start on 1.5% saline.  --Na q6h  #. Cardiovascular  --Minimal elevation of troponin  --Echo unremarkable  #. Infection   --Mild elevation in WBC, reactive  --Will monitor  #. Endocrine:   --Insulin protocol to keep blood sugars 100-150.   #. Heme/Onc:   --No acute issues  #. Renal  --Sodium goal 140-155  #. Skin/Musculoskeletal:  --No issues   #. PT/OT/Speech  --continue evaluations   #. Nutrition  --Per speech therapy evaluation   #. 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 Upon EMS arrival, he was drooling, hypertensive, and remained unresponsive. No obvious trauma or seizure activity. He was incontinent. Upon waking was confused and appeared postictal. He was found to have left leg weakness and code stroke was called. Imaging demonstrated SAH likely from AcomA aneurysm. Aneurysm coiled on 1/23/17.   Headache  continues. No seizures. No weakness.     Physical Exam      Vitals:  Filed Vitals:    17 1133   Weight: 104.327 kg (230 lb)     Temp: 98  F (36.7  C) Temp src: Oral BP: (!) 161/101 mmHg   Heart Rate: 71 Resp: 18 SpO2: 98 % O2 Device: None (Room air) Oxygen Delivery: 2 LPM Height: 182.9 cm (6') Weight: 104.327 kg (230 lb)        Tmax: Temp (24hrs), Av.2  F (36.8  C), Min:98  F (36.7  C), Max:98.5  F (36.9  C)    BP - Mean:  [] 125  Location:  [-]    I&O:    Intake/Output Summary (Last 24 hours) at 17 0757  Last data filed at 17 0600   Gross per 24 hour   Intake   3945 ml   Output   1700 ml   Net   2245 ml    I/O since admission: 4527.49  Bowel movement: --         General Appearance:   No acute distress  Neuro:       Mental Status Exam:   Awake, alert, oriented X3. Speech and language are intact. Mental status is normal       Cranial Nerves:  Pupils 3 mm, reactive. EOMI. Face sensation is normal. Face is symmetric. Tongue and uvula are midline. Other CN are normal           Motor:  5/5 X 4. Tone and bulk are normal           Reflexes:  Normal DTR. Toes downgoing.        Sensory:  Normal to light touch                  Coordination:   Intact finger-to-nose        Gait:  Untestable    Cardiovascular: Regular rate and rhythm, no m/r/g  Lungs: Resp: 18  Both hemithoraces are symmetrical, auscultation of lungs revealed no bronchovesicular sounds, expirium prolongation, wheezing, rhonci and crackles  Abdomen: Soft, not tender, not distended  Skin/Extremities: No clubbing, cyanosis, no edema   Lines: No erythema or discharge at entry site for Arterial Line. Current lines are required for patient management    Medications  Infusions medications:    IV infusion builder WITH LARGE additive list       niCARdipine 40 mg in 200 mL 0.9% NaCl Stopped (17 1021)     NaCl 1,000 mL (17 0622)     - MEDICATION INSTRUCTIONS -       Schedule medications:    cyanocobalamin  1,000 mcg Sublingual Daily      levETIRAcetam  500 mg Oral BID     niMODipine  60 mg Oral Q4H     ondansetron  4 mg Intravenous Q6H ZACKERY     sodium chloride (PF)  3 mL Intracatheter Q8H     PRN medications:tramadol, labetalol, hydrALAZINE, LORazepam, sodium chloride (PF), - MEDICATION INSTRUCTIONS -, ondansetron **OR** ondansetron, prochlorperazine **OR** prochlorperazine **OR** prochlorperazine, metoclopramide **OR** metoclopramide, HYDROmorphone, oxyCODONE, naloxone, acetaminophen **OR** acetaminophen  Data      Labotary Data:   All data was reviewed by me personally  CBC RESULTS:  Recent Labs   Lab Test  01/24/17   0455  01/23/17   0915   WBC  11.6*  6.3   RBC  4.01*  4.48   HGB  13.0*  14.4   HCT  36.0*  40.0   PLT  196  180     Basic Metabolic Panel:  Recent Labs   Lab Test  01/26/17   0607  01/26/17   0500  01/24/17   0455  01/23/17   0915   NA  127*  127*  137  140   POTASSIUM   --   3.6  3.6  3.5   CHLORIDE   --   95  102  105   CO2   --   25  26  29   BUN   --   10  6*  12   CR   --   0.58*  0.64*  0.68   GLC   --   115*  132*  124*   RADHA   --   8.4*  8.0*  8.6     Lipid Profile:  Recent Labs   Lab Test  01/23/17   1800   CHOL  129   HDL  43   LDL  68   TRIG  91     Thyroid Panel:  Recent Labs   Lab Test  01/23/17   1800   TSH  0.61      Vitamin B12:   Recent Labs   Lab Test  01/24/17   1135   B12  285  Quantity not sufficient      Vitamin D:  Recent Labs   Lab Test  01/23/17   1800   VITDT  36     A1C:   Recent Labs   Lab Test  01/23/17   1800   A1C  4.6     Troponin I:   Recent Labs   Lab Test  01/24/17   2325  01/24/17   1724  01/24/17   1135  01/24/17   0455  01/23/17   1800   TROPI  0.084*  0.062*  0.095*  0.095*  0.190*            Imaging:   All imaging studies were reviewed personally  CT head 1/23/17:   1. Extensive subarachnoid hemorrhage in a pattern that suggests aneurysmal rupture.  2. Mild hydrocephalus.  3. No evidence for ischemic infarct.    CTP head 1/23/17:  --Normal CT perfusion of the brain.    Cerebral angiogram:    --Irregular 5.2 x 5 mm anterior communicating artery aneurysm with a broad 3 mm neck and a leftward directed daughter bleb/pseudoaneurysm measuring 2.4 x 2.9 mm.  Technically successful coil embolization with complete angiographic closure of the daughter bleb/pseudoaneurysm as the presumed ruptured site, and good coil packing of the aneurysm proper, with mild persistent interstitial filling.    CT head 1/24/17:  --Interval endovascular coiling, otherwise no significant change. There is some persistent mild ventriculomegaly which is most likely due to mild hydrocephalus.    TCD 1/25/17:  --No evidence of vasospasm.        Time Spent on This Encounter     Time:   Neurocritical care time:  I spent 45 minutes bedside and on the inpatient unit today managing the neurocritical care of Malvin Au in relation to the issues listed in this note. Patient has very high risk of deterioration

## 2017-01-26 NOTE — PROGRESS NOTES
[facility] INTERVENTIONAL NEURORADIOLOGY    SUBJECTIVE: C/o headache and some nausea when getting out of bed to chair.     OBJECTIVE:   Intake/Output Summary (Last 24 hours) at 01/26/17 1052  Last data filed at 01/26/17 1000   Gross per 24 hour   Intake 4108.75 ml   Output   2050 ml   Net 2058.75 ml       Imaging:   TRANSCRANIAL DOPPLER ULTRASOUND  January 26, 2017 8:58 AM    IMPRESSION: No evidence of vasospasm.      Labs:   HGB     13.0   1/24/2017  PLT      196   1/24/2017  Recent Labs   Lab Test  01/26/17   0607  01/26/17   0500  01/24/17   0455   NA  127*  127*  137   CR   --   0.58*  0.64*         Exam:  /113 mmHg  Pulse 76  Temp(Src) 98.2  F (36.8  C) (Oral)  Resp 14  Ht 1.829 m (6')  Wt 104.327 kg (230 lb)  BMI 31.19 kg/m2  SpO2 96%  General: Sitting in recliner. Room dark. Spouse at bedside.  Neuro: Alert, oriented x 3. Speech fluent and clear. Equal  strength. No focal deficits noted.      ASSESSMENT:    SAH, post bleed day 3, secondary to ruptured anterior communicating artery aneurysm  Status post endovascular coil embolization on 1/23/2017  Mild hydrocephalus  Neurologically intact    Nausea    Headache  Hyponatremia - currently on 1.5% saline (had 3 hours of 3%; currently has PIV). Next sodium recheck at 1200.      PLAN:    Continue ICU care with SAH protocols  Appreciate NCC management per Dr. Chang  We will follow along    TCD's to monitor for vasospasm    Please call our office with any questions - [phone]        Britt Duran, CNP

## 2017-01-26 NOTE — PLAN OF CARE
Problem: Goal Outcome Summary  Goal: Goal Outcome Summary  OT: Pt remains in ICU. OT POC to continue with intervention once out of ICU to engage higher level tasks.

## 2017-01-27 ENCOUNTER — APPOINTMENT (OUTPATIENT)
Dept: CT IMAGING | Facility: CLINIC | Age: 42
DRG: 021 | End: 2017-01-27
Attending: PSYCHIATRY & NEUROLOGY
Payer: COMMERCIAL

## 2017-01-27 ENCOUNTER — APPOINTMENT (OUTPATIENT)
Dept: ULTRASOUND IMAGING | Facility: CLINIC | Age: 42
DRG: 021 | End: 2017-01-27
Attending: PSYCHIATRY & NEUROLOGY
Payer: COMMERCIAL

## 2017-01-27 LAB
COPATH REPORT: NORMAL
ERYTHROCYTE [DISTWIDTH] IN BLOOD BY AUTOMATED COUNT: 12.4 % (ref 10–15)
GLUCOSE BLDC GLUCOMTR-MCNC: 122 MG/DL (ref 70–99)
HCT VFR BLD AUTO: 36.6 % (ref 40–53)
HGB BLD-MCNC: 13.7 G/DL (ref 13.3–17.7)
MCH RBC QN AUTO: 32.7 PG (ref 26.5–33)
MCHC RBC AUTO-ENTMCNC: 37.4 G/DL (ref 31.5–36.5)
MCV RBC AUTO: 87 FL (ref 78–100)
OSMOLALITY SERPL: 267 MMOL/KG (ref 275–295)
OSMOLALITY SERPL: 267 MMOL/KG (ref 275–295)
OSMOLALITY SERPL: 268 MMOL/KG (ref 275–295)
OSMOLALITY SERPL: 268 MMOL/KG (ref 275–295)
PLATELET # BLD AUTO: 222 10E9/L (ref 150–450)
PROCALCITONIN SERPL-MCNC: NORMAL NG/ML
RBC # BLD AUTO: 4.19 10E12/L (ref 4.4–5.9)
SODIUM SERPL-SCNC: 126 MMOL/L (ref 133–144)
SODIUM SERPL-SCNC: 128 MMOL/L (ref 133–144)
SODIUM SERPL-SCNC: 129 MMOL/L (ref 133–144)
WBC # BLD AUTO: 12.8 10E9/L (ref 4–11)

## 2017-01-27 PROCEDURE — 25500064 ZZH RX 255 OP 636: Performed by: INTERNAL MEDICINE

## 2017-01-27 PROCEDURE — 83930 ASSAY OF BLOOD OSMOLALITY: CPT | Performed by: PSYCHIATRY & NEUROLOGY

## 2017-01-27 PROCEDURE — 25000125 ZZHC RX 250: Performed by: INTERNAL MEDICINE

## 2017-01-27 PROCEDURE — 70460 CT HEAD/BRAIN W/DYE: CPT

## 2017-01-27 PROCEDURE — 36415 COLL VENOUS BLD VENIPUNCTURE: CPT | Performed by: PSYCHIATRY & NEUROLOGY

## 2017-01-27 PROCEDURE — 25000125 ZZHC RX 250: Performed by: RADIOLOGY

## 2017-01-27 PROCEDURE — 85027 COMPLETE CBC AUTOMATED: CPT | Performed by: INTERNAL MEDICINE

## 2017-01-27 PROCEDURE — 25000132 ZZH RX MED GY IP 250 OP 250 PS 637: Performed by: INTERNAL MEDICINE

## 2017-01-27 PROCEDURE — 84145 PROCALCITONIN (PCT): CPT | Performed by: PSYCHIATRY & NEUROLOGY

## 2017-01-27 PROCEDURE — 27210995 ZZH RX 272: Performed by: PSYCHIATRY & NEUROLOGY

## 2017-01-27 PROCEDURE — 20000003 ZZH R&B ICU

## 2017-01-27 PROCEDURE — 84295 ASSAY OF SERUM SODIUM: CPT | Performed by: INTERNAL MEDICINE

## 2017-01-27 PROCEDURE — 36415 COLL VENOUS BLD VENIPUNCTURE: CPT | Performed by: INTERNAL MEDICINE

## 2017-01-27 PROCEDURE — 25000125 ZZHC RX 250: Performed by: PSYCHIATRY & NEUROLOGY

## 2017-01-27 PROCEDURE — 84295 ASSAY OF SERUM SODIUM: CPT | Performed by: PSYCHIATRY & NEUROLOGY

## 2017-01-27 PROCEDURE — 93886 INTRACRANIAL COMPLETE STUDY: CPT

## 2017-01-27 PROCEDURE — 00000146 ZZHCL STATISTIC GLUCOSE BY METER IP

## 2017-01-27 PROCEDURE — 99232 SBSQ HOSP IP/OBS MODERATE 35: CPT | Performed by: INTERNAL MEDICINE

## 2017-01-27 PROCEDURE — 25000132 ZZH RX MED GY IP 250 OP 250 PS 637: Performed by: RADIOLOGY

## 2017-01-27 PROCEDURE — 25000132 ZZH RX MED GY IP 250 OP 250 PS 637: Performed by: PSYCHIATRY & NEUROLOGY

## 2017-01-27 PROCEDURE — 70470 CT HEAD/BRAIN W/O & W/DYE: CPT | Mod: XS

## 2017-01-27 RX ORDER — IOPAMIDOL 755 MG/ML
50 INJECTION, SOLUTION INTRAVASCULAR ONCE
Status: COMPLETED | OUTPATIENT
Start: 2017-01-27 | End: 2017-01-27

## 2017-01-27 RX ADMIN — NIMODIPINE 60 MG: 30 CAPSULE ORAL at 21:14

## 2017-01-27 RX ADMIN — ONDANSETRON 4 MG: 4 TABLET, ORALLY DISINTEGRATING ORAL at 18:30

## 2017-01-27 RX ADMIN — ONDANSETRON HYDROCHLORIDE 4 MG: 2 INJECTION, SOLUTION INTRAMUSCULAR; INTRAVENOUS at 00:59

## 2017-01-27 RX ADMIN — ACETAMINOPHEN 650 MG: 325 TABLET, FILM COATED ORAL at 18:30

## 2017-01-27 RX ADMIN — LEVETIRACETAM 500 MG: 500 TABLET, FILM COATED ORAL at 08:02

## 2017-01-27 RX ADMIN — NIMODIPINE 60 MG: 30 CAPSULE ORAL at 06:25

## 2017-01-27 RX ADMIN — Medication 1000 MCG: at 08:02

## 2017-01-27 RX ADMIN — OXYCODONE HYDROCHLORIDE 10 MG: 5 TABLET ORAL at 10:51

## 2017-01-27 RX ADMIN — NIMODIPINE 60 MG: 30 CAPSULE ORAL at 18:30

## 2017-01-27 RX ADMIN — ACETAMINOPHEN 650 MG: 325 TABLET, FILM COATED ORAL at 06:28

## 2017-01-27 RX ADMIN — NIMODIPINE 60 MG: 30 CAPSULE ORAL at 14:09

## 2017-01-27 RX ADMIN — OXYCODONE HYDROCHLORIDE 10 MG: 5 TABLET ORAL at 06:28

## 2017-01-27 RX ADMIN — OXYCODONE HYDROCHLORIDE 10 MG: 5 TABLET ORAL at 18:30

## 2017-01-27 RX ADMIN — ONDANSETRON HYDROCHLORIDE 4 MG: 2 INJECTION, SOLUTION INTRAMUSCULAR; INTRAVENOUS at 10:57

## 2017-01-27 RX ADMIN — LEVETIRACETAM 500 MG: 500 TABLET, FILM COATED ORAL at 21:14

## 2017-01-27 RX ADMIN — NIMODIPINE 60 MG: 30 CAPSULE ORAL at 02:23

## 2017-01-27 RX ADMIN — ONDANSETRON HYDROCHLORIDE 4 MG: 2 INJECTION, SOLUTION INTRAMUSCULAR; INTRAVENOUS at 06:25

## 2017-01-27 RX ADMIN — SODIUM CHLORIDE: 234 INJECTION INTRAMUSCULAR; INTRAVENOUS; SUBCUTANEOUS at 22:38

## 2017-01-27 RX ADMIN — OXYCODONE HYDROCHLORIDE 10 MG: 5 TABLET ORAL at 14:09

## 2017-01-27 RX ADMIN — Medication 90 ML: at 06:05

## 2017-01-27 RX ADMIN — TRAMADOL HYDROCHLORIDE 50 MG: 50 TABLET, COATED ORAL at 08:02

## 2017-01-27 RX ADMIN — ACETAMINOPHEN 650 MG: 325 TABLET, FILM COATED ORAL at 10:51

## 2017-01-27 RX ADMIN — ACETAMINOPHEN 650 MG: 325 TABLET, FILM COATED ORAL at 14:09

## 2017-01-27 RX ADMIN — IOPAMIDOL 50 ML: 755 INJECTION, SOLUTION INTRAVENOUS at 05:00

## 2017-01-27 RX ADMIN — NIMODIPINE 60 MG: 30 CAPSULE ORAL at 10:41

## 2017-01-27 RX ADMIN — SODIUM CHLORIDE: 234 INJECTION INTRAMUSCULAR; INTRAVENOUS; SUBCUTANEOUS at 07:05

## 2017-01-27 RX ADMIN — OXYCODONE HYDROCHLORIDE 10 MG: 5 TABLET ORAL at 22:36

## 2017-01-27 RX ADMIN — ACETAMINOPHEN 650 MG: 325 TABLET, FILM COATED ORAL at 22:36

## 2017-01-27 RX ADMIN — SODIUM CHLORIDE: 234 INJECTION INTRAMUSCULAR; INTRAVENOUS; SUBCUTANEOUS at 15:24

## 2017-01-27 ASSESSMENT — VISUAL ACUITY
OU: NORMAL ACUITY

## 2017-01-27 ASSESSMENT — PAIN DESCRIPTION - DESCRIPTORS
DESCRIPTORS: HEADACHE

## 2017-01-27 NOTE — PLAN OF CARE
Problem: Stroke (Hemorrhagic) (Adult)  Goal: Signs and Symptoms of Listed Potential Problems Will be Absent or Manageable (Stroke)  Signs and symptoms of listed potential problems will be absent or manageable by discharge/transition of care (reference Stroke (Hemorrhagic) (Adult) CPG).   Outcome: No Change  [facility]   Intensive Care Unit   Nursing Note    Vital signs stable during the day, but hypertensive. PERRL, 3 mm with no neuro deficits. Moves all extremities.  Follows commands.  Pt is on room air.  Pt had nausea and emesis at 11 and 1840.   Labs noted.  Pt on 1.5% saline for low Na. Spouse Becky has been updated x 3 to POC and medications.  Pt was up in the chair for 3.5 hours today. Continue to monitor closely.      ROUTINE IP LABS (Last four results)  BMP  Recent Labs  Lab 01/26/17  1755 01/26/17  1213 01/26/17  0607 01/26/17  0500 01/24/17  0455 01/23/17  0915   * 130* 127* 127* 137 140   POTASSIUM  --   --   --  3.6 3.6 3.5   CHLORIDE  --   --   --  95 102 105   RADHA  --   --   --  8.4* 8.0* 8.6   CO2  --   --   --  25 26 29   BUN  --   --   --  10 6* 12   CR  --   --   --  0.58* 0.64* 0.68   GLC  --   --   --  115* 132* 124*     CBC  Recent Labs  Lab 01/26/17  1213 01/24/17  0455 01/23/17  0915   WBC 13.3* 11.6* 6.3   RBC 4.21* 4.01* 4.48   HGB 13.7 13.0* 14.4   HCT 37.0* 36.0* 40.0   MCV 88 90 89   MCH 32.5 32.4 32.1   MCHC 37.0* 36.1 36.0   RDW 12.5 12.4 12.4    196 180     Sudarshan Perez RN

## 2017-01-27 NOTE — PROGRESS NOTES
[facility]  [facility] [place]  Neuro-ICU Progress Note       Admission Date: 1/23/2017  Date of Service:01/27/2017   Hospital Day: 5   _________________________________     Main Plans for Today  --continue supportive care  --daily TCDs   --Na management per sliding protocol  Assessment and Plan  #. (I60.9) Subarachnoid hemorrhage (H)  (primary encounter diagnosis)  --Arevalo grade III, Mcguire-Salmeron grade II SAH  --Secondary to ruptured anterior communicating artery aneurysm   --no evidence of spasm on TCD 1/26/17, continue daily  (G91.9) Hydrocephalus  --Repeat CT head stable mild hydrocephalus  #. (R56.9) Convulsions, unspecified convulsion type (H)  --Seizure on the onset.   --Keppra for a week  #. (I10) Malignant essential hypertension  --Keep BP <180 mm Hg  #. (I60.9) Cerebral aneurysm rupture (H)  --Coiled 5.2 mm AcomA aneurysm 1/23/17  (E87.1) Hyponatremia  --Significant Na drop  --Usually a precursor for vasospasm  --Start on 1.5% saline.  --Na management per sliding scale  #. Cardiovascular  --Minimal elevation of troponin  --Echo unremarkable  #. Infection   --Mild elevation in WBC, reactive  --Will get procalcitonin  #. Endocrine:   --Insulin protocol to keep blood sugars 100-150.   #. Heme/Onc:   --No acute issues  #. Renal  --Sodium goal 140-155  #. Skin/Musculoskeletal:  --No issues   #. PT/OT/Speech  --continue evaluations   #. Nutrition  --Per speech therapy evaluation   #. DVT prophylaxis:   --mechanical only due to SAH      CODE STATUS: Full Code    Family update by me today: yes    Interval History  Patient presented after being found unconscious on the loading dock at [facility] where he was making a delivery. Upon EMS arrival, he was drooling, hypertensive, and remained unresponsive. No obvious trauma or seizure activity. He was incontinent. Upon waking was confused and appeared postictal. He was found to have left leg weakness and code stroke was called. Imaging  demonstrated SAH likely from AcomA aneurysm. Aneurysm coiled on 17.   Headache continues. No seizures. No weakness.   Continues to have asymptomatic hyponatremia.     Physical Exam      Vitals:  Filed Vitals:    17 1133   Weight: 104.327 kg (230 lb)     Temp: 98.5  F (36.9  C) Temp src: Oral BP: (!) 155/104 mmHg   Heart Rate: 80 Resp: 13 SpO2: 97 % O2 Device: None (Room air) Oxygen Delivery: 2 LPM Height: 182.9 cm (6') Weight: 104.327 kg (230 lb)        Tmax: Temp (24hrs), Av.5  F (36.9  C), Min:98.2  F (36.8  C), Max:99.1  F (37.3  C)    BP - Mean:  [109-139] 122  Location:  [-]    I&O:    Intake/Output Summary (Last 24 hours) at 17 0702  Last data filed at 17 0600   Gross per 24 hour   Intake 3757.08 ml   Output   3750 ml   Net   7.08 ml    I/O since admission:4459.57  Bowel movement: --         General Appearance:   No acute distress  Neuro:       Mental Status Exam:   Awake, alert, oriented X3. Speech and language are intact. Mental status is normal       Cranial Nerves:  Pupils 3 mm, reactive. EOMI. Face sensation is normal. Face is symmetric. Tongue and uvula are midline. Other CN are normal           Motor:  5/5 X 4. Tone and bulk are normal           Reflexes:  Normal DTR. Toes downgoing.        Sensory:  Normal to light touch                  Coordination:   Intact finger-to-nose        Gait:  Untestable    Cardiovascular: Regular rate and rhythm, no m/r/g  Lungs: Resp: 13  Both hemithoraces are symmetrical, auscultation of lungs revealed no bronchovesicular sounds, expirium prolongation, wheezing, rhonci and crackles  Abdomen: Soft, not tender, not distended  Skin/Extremities: No clubbing, cyanosis, no edema   Lines: No erythema or discharge at entry site for Arterial Line. Current lines are required for patient management    Medications  Infusions medications:    IV infusion builder WITH LARGE additive list 75 mL/hr at 17 0508     niCARdipine 40 mg in 200 mL 0.9% NaCl  Stopped (01/24/17 1021)     - MEDICATION INSTRUCTIONS -       Schedule medications:    cyanocobalamin  1,000 mcg Sublingual Daily     levETIRAcetam  500 mg Oral BID     niMODipine  60 mg Oral Q4H     ondansetron  4 mg Intravenous Q6H ZACKERY     sodium chloride (PF)  3 mL Intracatheter Q8H     PRN medications:tramadol, labetalol, hydrALAZINE, LORazepam, sodium chloride (PF), - MEDICATION INSTRUCTIONS -, ondansetron **OR** ondansetron, prochlorperazine **OR** prochlorperazine **OR** prochlorperazine, metoclopramide **OR** metoclopramide, HYDROmorphone, oxyCODONE, naloxone, acetaminophen **OR** acetaminophen  Data      Labotary Data:   All data was reviewed by me personally  CBC RESULTS:  Recent Labs   Lab Test  01/27/17   0400  01/26/17   1213  01/24/17   0455  01/23/17   0915   WBC  12.8*  13.3*  11.6*  6.3   RBC  4.19*  4.21*  4.01*  4.48   HGB  13.7  13.7  13.0*  14.4   HCT  36.6*  37.0*  36.0*  40.0   PLT  222  207  196  180     Basic Metabolic Panel:  Recent Labs   Lab Test  01/27/17   0620  01/27/17   0400  01/27/17   0055  01/26/17   1755  01/26/17   1213  01/26/17   0607  01/26/17   0500  01/24/17   0455  01/23/17   0915   NA  128*  128*  126*  129*  130*  127*  127*  137  140   POTASSIUM   --    --    --    --    --    --   3.6  3.6  3.5   CHLORIDE   --    --    --    --    --    --   95  102  105   CO2   --    --    --    --    --    --   25  26  29   BUN   --    --    --    --    --    --   10  6*  12   CR   --    --    --    --    --    --   0.58*  0.64*  0.68   GLC   --    --    --    --    --    --   115*  132*  124*   RADHA   --    --    --    --    --    --   8.4*  8.0*  8.6     Lipid Profile:  Recent Labs   Lab Test  01/23/17   1800   CHOL  129   HDL  43   LDL  68   TRIG  91     Thyroid Panel:  Recent Labs   Lab Test  01/23/17   1800   TSH  0.61      Vitamin B12:   Recent Labs   Lab Test  01/24/17   1135   B12  285  Quantity not sufficient      Vitamin D:  Recent Labs   Lab Test  01/23/17   1800   VITDT   36     A1C:   Recent Labs   Lab Test  01/23/17   1800   A1C  4.6     Troponin I:   Recent Labs   Lab Test  01/24/17   2325  01/24/17   1724  01/24/17   1135  01/24/17   0455  01/23/17   1800   TROPI  0.084*  0.062*  0.095*  0.095*  0.190*            Imaging:   All imaging studies were reviewed personally  CT head 1/23/17:   1. Extensive subarachnoid hemorrhage in a pattern that suggests aneurysmal rupture.  2. Mild hydrocephalus.  3. No evidence for ischemic infarct.    CTP head 1/23/17:  --Normal CT perfusion of the brain.    Cerebral angiogram:   --Irregular 5.2 x 5 mm anterior communicating artery aneurysm with a broad 3 mm neck and a leftward directed daughter bleb/pseudoaneurysm measuring 2.4 x 2.9 mm.  Technically successful coil embolization with complete angiographic closure of the daughter bleb/pseudoaneurysm as the presumed ruptured site, and good coil packing of the aneurysm proper, with mild persistent interstitial filling.    CT head 1/24/17:  --Interval endovascular coiling, otherwise no significant change. There is some persistent mild ventriculomegaly which is most likely due to mild hydrocephalus.    TCD 1/25/17:  --No evidence of vasospasm.        Time Spent on This Encounter     Time:   Neurocritical care time:  I spent 45 minutes bedside and on the inpatient unit today managing the neurocritical care of Malvin Au in relation to the issues listed in this note. Patient has very high risk of deterioration

## 2017-01-27 NOTE — PROGRESS NOTES
Na rise from 126 to 128. Reduce 1.5% saline back to 75 ml/hr from 100 ml/hr. Recheck Na at 6 am.    Nick Hazel MD  [phone]

## 2017-01-27 NOTE — PROGRESS NOTES
[facility] INTERVENTIONAL NEUR[facility]ADIOLOGY  REMOTE NOTE    Chart reviewed     OBJECTIVE:   Intake/Output Summary (Last 24 hours) at 01/27/17 1036  Last data filed at 01/27/17 0800   Gross per 24 hour   Intake 3218.33 ml   Output   3150 ml   Net  68.33 ml       Imaging:   TCD's 1/27/2017 - pending     CT BRAIN PERFUSION  1/27/2017 6:05 AM  IMPRESSION: Normal CT perfusion of the brain.    CT OF THE HEAD WITHOUT CONTRAST  1/27/2017 6:04 AM    IMPRESSION:  1. Continued decrease in subarachnoid hemorrhage in the anterior  aspect of the interhemispheric fissure.  2. Slight interval decrease in maximum transverse diameter of the  third ventricle suggesting improving hydrocephalus.  3. No evidence for new or increasing hemorrhage.  4. No evidence for ischemic infarct.    TRANSCRANIAL DOPPLER ULTRASOUND January 26, 2017  IMPRESSION: No evidence of vasospasm.      Labs:   HGB     13.7   1/27/2017  PLT      222   1/27/2017  Recent Labs   Lab Test  01/27/17   0620  01/27/17   0400   01/26/17   0500  01/24/17   0455   NA  128*  128*   < >  127*  137   CR   --    --    --   0.58*  0.64*    < > = values in this interval not displayed.         EXAM:   /94 mmHg  Pulse 76  Temp(Src) 99.5  F (37.5  C) (Axillary)  Resp 16  Ht 1.829 m (6')  Wt 104.327 kg (230 lb)  BMI 31.19 kg/m2  SpO2 93%  Please refer to Dr. Chang's note for physical exam      ASSESSMENT:    SAH, post bleed day 4, secondary to ruptured anterior communicating artery aneurysm  Status post endovascular coil embolization on 1/23/2017  Mild hydrocephalus  Neurologically intact    Nausea    Headache  Hyponatremia - currently on 1.5% saline infusion      PLAN:   Continue ICU care with SAH protocols  Appreciate NCC management per Dr. Chang  We will follow along    TCD's to monitor for vasospasm      [facility] will see tomorrow  Please call our office with any questions - [phone]  Dr. Caruso on-call today and Sunday, pager [phone]  Dr. Beltran on-call Saturday, pager  [phone]      Britt Duran, CNP

## 2017-01-27 NOTE — PLAN OF CARE
Problem: Goal Outcome Summary  Goal: Goal Outcome Summary  OT: Pt having procedure at time of attempt for OT intervention.

## 2017-01-27 NOTE — PROGRESS NOTES
X cover note:    Sodium trended down to 129 from 130  Patient on both 1.5 NS at 75cc/hr and also NS at 125cc/hr.  Will continue 1.5 NS, incraese rate by 25cc/hr and dc NS  Recheck sodium in 2 hours.    Alok Acevedo MD  Hospitalist

## 2017-01-28 ENCOUNTER — APPOINTMENT (OUTPATIENT)
Dept: OCCUPATIONAL THERAPY | Facility: CLINIC | Age: 42
DRG: 021 | End: 2017-01-28
Payer: COMMERCIAL

## 2017-01-28 ENCOUNTER — APPOINTMENT (OUTPATIENT)
Dept: ULTRASOUND IMAGING | Facility: CLINIC | Age: 42
DRG: 021 | End: 2017-01-28
Attending: PSYCHIATRY & NEUROLOGY
Payer: COMMERCIAL

## 2017-01-28 LAB
ERYTHROCYTE [DISTWIDTH] IN BLOOD BY AUTOMATED COUNT: 12.6 % (ref 10–15)
HCT VFR BLD AUTO: 36.3 % (ref 40–53)
HGB BLD-MCNC: 13.6 G/DL (ref 13.3–17.7)
MCH RBC QN AUTO: 32.5 PG (ref 26.5–33)
MCHC RBC AUTO-ENTMCNC: 37.5 G/DL (ref 31.5–36.5)
MCV RBC AUTO: 87 FL (ref 78–100)
OSMOLALITY SERPL: 258 MMOL/KG (ref 275–295)
OSMOLALITY SERPL: 259 MMOL/KG (ref 275–295)
OSMOLALITY SERPL: 261 MMOL/KG (ref 275–295)
OSMOLALITY SERPL: 263 MMOL/KG (ref 275–295)
PLATELET # BLD AUTO: 227 10E9/L (ref 150–450)
RBC # BLD AUTO: 4.19 10E12/L (ref 4.4–5.9)
SODIUM SERPL-SCNC: 125 MMOL/L (ref 133–144)
SODIUM SERPL-SCNC: 126 MMOL/L (ref 133–144)
SODIUM SERPL-SCNC: 127 MMOL/L (ref 133–144)
SODIUM SERPL-SCNC: 127 MMOL/L (ref 133–144)
WBC # BLD AUTO: 10.5 10E9/L (ref 4–11)

## 2017-01-28 PROCEDURE — 27210995 ZZH RX 272: Performed by: PSYCHIATRY & NEUROLOGY

## 2017-01-28 PROCEDURE — 25000125 ZZHC RX 250: Performed by: RADIOLOGY

## 2017-01-28 PROCEDURE — 25000132 ZZH RX MED GY IP 250 OP 250 PS 637: Performed by: RADIOLOGY

## 2017-01-28 PROCEDURE — 36415 COLL VENOUS BLD VENIPUNCTURE: CPT | Performed by: PSYCHIATRY & NEUROLOGY

## 2017-01-28 PROCEDURE — 25000132 ZZH RX MED GY IP 250 OP 250 PS 637: Performed by: PSYCHIATRY & NEUROLOGY

## 2017-01-28 PROCEDURE — 20000003 ZZH R&B ICU

## 2017-01-28 PROCEDURE — 93886 INTRACRANIAL COMPLETE STUDY: CPT

## 2017-01-28 PROCEDURE — 85027 COMPLETE CBC AUTOMATED: CPT | Performed by: PSYCHIATRY & NEUROLOGY

## 2017-01-28 PROCEDURE — 25000125 ZZHC RX 250: Performed by: PSYCHIATRY & NEUROLOGY

## 2017-01-28 PROCEDURE — 25000132 ZZH RX MED GY IP 250 OP 250 PS 637: Performed by: INTERNAL MEDICINE

## 2017-01-28 PROCEDURE — 83930 ASSAY OF BLOOD OSMOLALITY: CPT | Performed by: PSYCHIATRY & NEUROLOGY

## 2017-01-28 PROCEDURE — 84295 ASSAY OF SERUM SODIUM: CPT | Performed by: PSYCHIATRY & NEUROLOGY

## 2017-01-28 PROCEDURE — 97535 SELF CARE MNGMENT TRAINING: CPT | Mod: GO | Performed by: REHABILITATION PRACTITIONER

## 2017-01-28 PROCEDURE — 40000133 ZZH STATISTIC OT WARD VISIT: Performed by: REHABILITATION PRACTITIONER

## 2017-01-28 PROCEDURE — 99232 SBSQ HOSP IP/OBS MODERATE 35: CPT | Performed by: INTERNAL MEDICINE

## 2017-01-28 RX ORDER — SODIUM CHLORIDE 1 G/1
1 TABLET ORAL
Status: DISCONTINUED | OUTPATIENT
Start: 2017-01-28 | End: 2017-01-31

## 2017-01-28 RX ADMIN — NIMODIPINE 60 MG: 30 CAPSULE ORAL at 01:23

## 2017-01-28 RX ADMIN — NIMODIPINE 60 MG: 30 CAPSULE ORAL at 13:26

## 2017-01-28 RX ADMIN — TRAMADOL HYDROCHLORIDE 50 MG: 50 TABLET, COATED ORAL at 05:57

## 2017-01-28 RX ADMIN — OXYCODONE HYDROCHLORIDE 10 MG: 5 TABLET ORAL at 12:44

## 2017-01-28 RX ADMIN — ACETAMINOPHEN 650 MG: 325 TABLET, FILM COATED ORAL at 19:54

## 2017-01-28 RX ADMIN — TRAMADOL HYDROCHLORIDE 50 MG: 50 TABLET, COATED ORAL at 20:58

## 2017-01-28 RX ADMIN — ONDANSETRON 4 MG: 4 TABLET, ORALLY DISINTEGRATING ORAL at 11:43

## 2017-01-28 RX ADMIN — SODIUM CHLORIDE: 234 INJECTION INTRAMUSCULAR; INTRAVENOUS; SUBCUTANEOUS at 20:16

## 2017-01-28 RX ADMIN — SODIUM CHLORIDE TAB 1 GM 1 G: 1 TAB at 11:43

## 2017-01-28 RX ADMIN — Medication 1000 MCG: at 08:38

## 2017-01-28 RX ADMIN — OXYCODONE HYDROCHLORIDE 10 MG: 5 TABLET ORAL at 08:38

## 2017-01-28 RX ADMIN — ACETAMINOPHEN 650 MG: 325 TABLET, FILM COATED ORAL at 03:12

## 2017-01-28 RX ADMIN — ACETAMINOPHEN 650 MG: 325 TABLET, FILM COATED ORAL at 16:20

## 2017-01-28 RX ADMIN — OXYCODONE HYDROCHLORIDE 10 MG: 5 TABLET ORAL at 16:20

## 2017-01-28 RX ADMIN — TRAMADOL HYDROCHLORIDE 50 MG: 50 TABLET, COATED ORAL at 00:00

## 2017-01-28 RX ADMIN — LEVETIRACETAM 500 MG: 500 TABLET, FILM COATED ORAL at 21:27

## 2017-01-28 RX ADMIN — SODIUM CHLORIDE: 234 INJECTION INTRAMUSCULAR; INTRAVENOUS; SUBCUTANEOUS at 06:30

## 2017-01-28 RX ADMIN — ACETAMINOPHEN 650 MG: 325 TABLET, FILM COATED ORAL at 12:44

## 2017-01-28 RX ADMIN — OXYCODONE HYDROCHLORIDE 10 MG: 5 TABLET ORAL at 03:12

## 2017-01-28 RX ADMIN — ACETAMINOPHEN 650 MG: 325 TABLET, FILM COATED ORAL at 08:38

## 2017-01-28 RX ADMIN — LABETALOL HYDROCHLORIDE 10 MG: 5 INJECTION, SOLUTION INTRAVENOUS at 01:18

## 2017-01-28 RX ADMIN — NIMODIPINE 60 MG: 30 CAPSULE ORAL at 11:43

## 2017-01-28 RX ADMIN — NIMODIPINE 60 MG: 30 CAPSULE ORAL at 21:27

## 2017-01-28 RX ADMIN — OXYCODONE HYDROCHLORIDE 10 MG: 5 TABLET ORAL at 19:54

## 2017-01-28 RX ADMIN — LEVETIRACETAM 500 MG: 500 TABLET, FILM COATED ORAL at 08:38

## 2017-01-28 RX ADMIN — NIMODIPINE 60 MG: 30 CAPSULE ORAL at 05:57

## 2017-01-28 RX ADMIN — ONDANSETRON 4 MG: 4 TABLET, ORALLY DISINTEGRATING ORAL at 17:45

## 2017-01-28 RX ADMIN — SODIUM CHLORIDE TAB 1 GM 1 G: 1 TAB at 17:45

## 2017-01-28 RX ADMIN — NIMODIPINE 60 MG: 30 CAPSULE ORAL at 17:44

## 2017-01-28 ASSESSMENT — VISUAL ACUITY
OU: NORMAL ACUITY

## 2017-01-28 ASSESSMENT — PAIN DESCRIPTION - DESCRIPTORS
DESCRIPTORS: HEADACHE

## 2017-01-28 NOTE — PLAN OF CARE
Problem: Goal Outcome Summary  Goal: Goal Outcome Summary  Pt working with OT in AM and when PT returned in PM, pt eating lunch and was then going to be visiting with his children.  Preferred to wait until tomorrow to work with PT.  Rescheduled for tomorrow.

## 2017-01-28 NOTE — PROGRESS NOTES
[facility]    Hospitalist Progress Note    Date of Service (when I saw the patient): 01/28/2017    Assessment and Plan  Malvin Au is a 41 year old male who was admitted on 1/23/2017.    1. Subarachnoid hemmorhage: donovan grade II, Mcguire-Salmeron grade II SAH secondary to ruptured anterior commincating artery aneurysm  --s/p coiling t.2 mm AcomA aneuyrsm on 1/23/17  --Doing well overall.   PT, OT, SLP consulted. Transcranial dopplers daily to monitor for vasospasm.     2. Hypertensive emergency:   ---patient has been weaned of nicardipine gtts. Now on nimodipine.. Neurocritical care recommending initial Systolic goal of <180.    3. Hyponatremia: can be precursor to vasospasm, thus, Hypertonic saline being given per Neurocritical care. Salt tablets added by Neurocritical care team today. Checking Na every 6 hours.     4. Troponin elevation: likley secondary to #1 and #2. TTE shows mild LVH, negative bubble study.     5. Convulsions Secondary to #1: on keppra.     6. Nausea and vomiting secondary to #1.  Improving.   Continue IV ativan and zofran.     6.  Skin lesions: Concern for celiac disease: Wife is concerned that patient has celiac disease as he has other family members with this condition    Transglutaminase IgG and IgA are negative. Would defer further workup to outpatient setting if symptoms of diarhea, malabsorption present. Given strong family history, have advised family to consider further workup via GI if indicated.     DVT Prophylaxis: Pneumatic Compression Devices  Code Status: Full Code    Disposition: Expected discharge in 3-5 days     Demetrius Farah MD  [phone] (P)  Text Page (7 am to 6 pm)    Interval History      Wife at bedside.    No further vomiting.    Was able to stand with OT today. No PT eval yet.    Feels better overall.    Discussed with RN.      -Data reviewed today: I reviewed all new labs and imaging results over the last 24 hours    dPhysical Exam  Temp: 97.7   F (36.5  C) Temp src: Axillary BP: (!) 151/107 mmHg   Heart Rate: 75 Resp: 13 SpO2: 98 % O2 Device: None (Room air)    Filed Vitals:    01/23/17 1133 01/28/17 0600   Weight: 104.327 kg (230 lb) 101 kg (222 lb 10.6 oz)     Vital Signs with Ranges  Temp:  [97.7  F (36.5  C)-99.8  F (37.7  C)] 97.7  F (36.5  C)  Heart Rate:  [62-81] 75  Resp:  [9-18] 13  BP: (132-167)/() 151/107 mmHg  SpO2:  [95 %-99 %] 98 %  I/O last 3 completed shifts:  In: 3135 [P.O.:410; I.V.:2725]  Out: 4500 [Urine:4500]    Constitutional: No acute distress  HEENT: no facial asymmetry. EOMI  Respiratory: Clear to auscultation bilaterally, no crackles  Cardiovascular: Regular rate and rhythm, S1, S2, no murmurs  GI: Abdomen soft, nontender, nondistended, bowel sounds are heard  Skin/Integumen: No jaundice, no suspicious rash  NEURO: no focal deficits.      Medications    IV infusion builder WITH LARGE additive list 75 mL/hr at 01/28/17 1334     niCARdipine 40 mg in 200 mL 0.9% NaCl Stopped (01/24/17 1021)     - MEDICATION INSTRUCTIONS -         sodium chloride  1 g Oral TID w/meals     cyanocobalamin  1,000 mcg Sublingual Daily     levETIRAcetam  500 mg Oral BID     niMODipine  60 mg Oral Q4H     ondansetron  4 mg Intravenous Q6H ZACKERY     sodium chloride (PF)  3 mL Intracatheter Q8H       Data    Recent Labs  Lab 01/28/17  1139 01/28/17  0600 01/28/17  0010  01/27/17  0400  01/26/17  1213  01/26/17  0500 01/24/17  2325 01/24/17  1724 01/24/17  1135 01/24/17  0455  01/23/17  0915   WBC  --  10.5  --   --  12.8*  --  13.3*  --   --   --   --   --  11.6*  --  6.3   HGB  --  13.6  --   --  13.7  --  13.7  --   --   --   --   --  13.0*  --  14.4   MCV  --  87  --   --  87  --  88  --   --   --   --   --  90  --  89   PLT  --  227  --   --  222  --  207  --   --   --   --   --  196  --  180   * 126* 127*  < > 128*  < > 130*  < > 127*  --   --   --  137  --  140   POTASSIUM  --   --   --   --   --   --   --   --  3.6  --   --   --  3.6  --   3.5   CHLORIDE  --   --   --   --   --   --   --   --  95  --   --   --  102  --  105   CO2  --   --   --   --   --   --   --   --  25  --   --   --  26  --  29   BUN  --   --   --   --   --   --   --   --  10  --   --   --  6*  --  12   CR  --   --   --   --   --   --   --   --  0.58*  --   --   --  0.64*  --  0.68   ANIONGAP  --   --   --   --   --   --   --   --  7  --   --   --  9  --  6   RADHA  --   --   --   --   --   --   --   --  8.4*  --   --   --  8.0*  --  8.6   GLC  --   --   --   --   --   --   --   --  115*  --   --   --  132*  --  124*   TROPI  --   --   --   --   --   --   --   --   --  0.084* 0.062* 0.095* 0.095*  < >  --    < > = values in this interval not displayed.    Recent Results (from the past 24 hour(s))   US Transcranial Doppler Complete Port    Narrative    US TRANSCRANIAL DOPPLER COMPLETE PORTABLE 1/27/2017 4:39 PM     HISTORY: Subarachnoid hemorrhage. Aneurysm coiling. Assess for  vasospasm.    TECHNIQUE: Transcranial Doppler ultrasound.     COMPARISON: 6/26/2017.    FINDINGS:    Mean velocities in cm/s are as follows.    Right M1: 63; pulsatility index 1.09; Lindegaard index 1.53.    Right A1: 32.    Right PCA: 27.    Right internal carotid artery: 41.    Left MCA: 66; pulsatility index 1.11; Lindegaard index 1.7.    Left A1: 40.    Left PCA: 45.    Left internal carotid artery: 39.    Basilar artery 29.      Impression    IMPRESSION: No evidence for vasospasm.    PROSPER ENGLISH MD   US Transcranial Doppler Complete Port    Narrative    TRANSCRANIAL DOPPLER ULTRASOUND   1/28/2017 8:14 AM     COMPARISON: Transcranial Doppler results 1/27/2017.    HISTORY: Vasospasm.    FINDINGS:  Mean velocities are as follows:    Right M1:   57 cm/sec; pulsatility index: 1.2; Lindegaard index: 1.6   Right A1:    30 cm/sec.  Right PCA: 32 cm/sec.  Right ICA:   36 cm/sec.     Left M1:   61 cm/sec; pulsatility index: 1.2; Lindegaard index: 1.8   Left A1:    45 cm/sec.  Left PCA: 48 cm/sec.  Left ICA:   34  cm/sec.     Basilar:  33 cm/sec.      Impression    IMPRESSION: No evidence of vasospasm.    HANS MALDONADO MD

## 2017-01-28 NOTE — PROGRESS NOTES
[facility]  Neuroscience and Spine [place]  Neuro-ICU Progress Note       Admission Date: 1/23/2017  Date of Service:01/28/2017   Hospital Day: 6   _________________________________     Main Plans for Today  --continue supportive care  --daily TCDs   --Na management per sliding protocol  Assessment and Plan  #. (I60.9) Subarachnoid hemorrhage (H)  (primary encounter diagnosis)  --Arevalo grade III, Mcguire-Salmeron grade II SAH  --Secondary to ruptured anterior communicating artery aneurysm   --no evidence of spasm on TCD 1/26/17, continue daily  (G91.9) Hydrocephalus  --Repeat CT head stable mild hydrocephalus  #. (R56.9) Convulsions, unspecified convulsion type (H)  --Seizure on the onset.   --Keppra for a week  #. (I10) Malignant essential hypertension  --Keep BP <180 mm Hg  #. (I60.9) Cerebral aneurysm rupture (H)  --Coiled 5.2 mm AcomA aneurysm 1/23/17  (E87.1) Hyponatremia  --Significant Na drop  --Usually a precursor for vasospasm  --Start on 1.5% saline.  --Na management per sliding scale  #. Cardiovascular  --Minimal elevation of troponin  --Echo unremarkable  #. Infection   --Normal WBC  --Procalcitonin - normal  #. Endocrine:   --Insulin protocol to keep blood sugars 100-150.   #. Heme/Onc:   --No acute issues  #. Renal  --Sodium goal 140-155  #. Skin/Musculoskeletal:  --No issues   #. PT/OT/Speech  --continue evaluations   #. Nutrition  --Per speech therapy evaluation   #. DVT prophylaxis:   --mechanical only due to SAH      CODE STATUS: Full Code    Family update by me today: yes    Interval History  Patient presented after being found unconscious on the loading dock at [facility] where he was making a delivery. Upon EMS arrival, he was drooling, hypertensive, and remained unresponsive. No obvious trauma or seizure activity. He was incontinent. Upon waking was confused and appeared postictal. He was found to have left leg weakness and code stroke was called. Imaging demonstrated SAH likely  from AcomA aneurysm. Aneurysm coiled on 17.   Continues to have asymptomatic hyponatremia. Headache continues, not as severe. No seizures. No weakness.     Physical Exam      Vitals:  Filed Vitals:    17 1133 17 0600   Weight: 104.327 kg (230 lb) 101 kg (222 lb 10.6 oz)     Temp: 99.8  F (37.7  C) Temp src: Oral BP: (!) 148/98 mmHg   Heart Rate: 62 Resp: 13 SpO2: 95 % O2 Device: None (Room air) Oxygen Delivery: 2 LPM Height: 182.9 cm (6') Weight: 101 kg (222 lb 10.6 oz)        Tmax: Temp (24hrs), Av  F (37.2  C), Min:98.2  F (36.8  C), Max:99.8  F (37.7  C)    BP - Mean:  [] 119  Location:  [-]    I&O:    Intake/Output Summary (Last 24 hours) at 17 0859  Last data filed at 17 0800   Gross per 24 hour   Intake   3385 ml   Output   4475 ml   Net  -1090 ml    I/O since admission: 3619.57  Bowel movement: --       General Appearance:   No acute distress  Neuro:       Mental Status Exam:   Awake, alert, oriented X3. Speech and language are intact. Mental status is normal       Cranial Nerves:  Pupils 3 mm, reactive. EOMI. Face sensation is normal. Face is symmetric. Tongue and uvula are midline. Other CN are normal           Motor:  5/5 X 4. Tone and bulk are normal           Reflexes:  Normal DTR. Toes downgoing.        Sensory:  Normal to light touch                  Coordination:   Intact finger-to-nose        Gait:  Untestable    Cardiovascular: Regular rate and rhythm, no m/r/g  Lungs: Resp: 13  Both hemithoraces are symmetrical, auscultation of lungs revealed no bronchovesicular sounds, expirium prolongation, wheezing, rhonci and crackles  Abdomen: Soft, not tender, not distended  Skin/Extremities: No clubbing, cyanosis, no edema   Lines: No erythema or discharge at entry site for Arterial Line. Current lines are required for patient management    Medications  Infusions medications:    IV infusion builder WITH LARGE additive list 125 mL/hr at 17 0034     niCARdipine 40  mg in 200 mL 0.9% NaCl Stopped (01/24/17 1021)     - MEDICATION INSTRUCTIONS -       Schedule medications:    cyanocobalamin  1,000 mcg Sublingual Daily     levETIRAcetam  500 mg Oral BID     niMODipine  60 mg Oral Q4H     ondansetron  4 mg Intravenous Q6H ZACKERY     sodium chloride (PF)  3 mL Intracatheter Q8H     PRN medications:tramadol, labetalol, hydrALAZINE, LORazepam, sodium chloride (PF), - MEDICATION INSTRUCTIONS -, ondansetron **OR** ondansetron, prochlorperazine **OR** prochlorperazine **OR** prochlorperazine, metoclopramide **OR** metoclopramide, HYDROmorphone, oxyCODONE, naloxone, acetaminophen **OR** acetaminophen  Data      Labotary Data:   All data was reviewed by me personally  CBC RESULTS:  Recent Labs   Lab Test  01/28/17   0600  01/27/17   0400  01/26/17   1213  01/24/17   0455  01/23/17   0915   WBC  10.5  12.8*  13.3*  11.6*  6.3   RBC  4.19*  4.19*  4.21*  4.01*  4.48   HGB  13.6  13.7  13.7  13.0*  14.4   HCT  36.3*  36.6*  37.0*  36.0*  40.0   PLT  227  222  207  196  180     Basic Metabolic Panel:  Recent Labs   Lab Test  01/28/17   0600  01/28/17   0010  01/27/17   1803  01/27/17   1220  01/27/17   0620  01/27/17   0400   01/26/17   0500  01/24/17   0455  01/23/17   0915   NA  126*  127*  129*  128*  128*  128*   < >  127*  137  140   POTASSIUM   --    --    --    --    --    --    --   3.6  3.6  3.5   CHLORIDE   --    --    --    --    --    --    --   95  102  105   CO2   --    --    --    --    --    --    --   25  26  29   BUN   --    --    --    --    --    --    --   10  6*  12   CR   --    --    --    --    --    --    --   0.58*  0.64*  0.68   GLC   --    --    --    --    --    --    --   115*  132*  124*   RADHA   --    --    --    --    --    --    --   8.4*  8.0*  8.6    < > = values in this interval not displayed.     Lipid Profile:  Recent Labs   Lab Test  01/23/17   1800   CHOL  129   HDL  43   LDL  68   TRIG  91     Thyroid Panel:  Recent Labs   Lab Test  01/23/17   1800   TSH   0.61      Vitamin B12:   Recent Labs   Lab Test  01/24/17   1135   B12  285  Quantity not sufficient      Vitamin D:  Recent Labs   Lab Test  01/23/17   1800   VITDT  36     A1C:   Recent Labs   Lab Test  01/23/17   1800   A1C  4.6     Troponin I:   Recent Labs   Lab Test  01/24/17   2325  01/24/17   1724  01/24/17   1135  01/24/17   0455  01/23/17   1800   TROPI  0.084*  0.062*  0.095*  0.095*  0.190*            Imaging:   All imaging studies were reviewed personally  CT head 1/23/17:   1. Extensive subarachnoid hemorrhage in a pattern that suggests aneurysmal rupture.  2. Mild hydrocephalus.  3. No evidence for ischemic infarct.    CTP head 1/23/17:  --Normal CT perfusion of the brain.    Cerebral angiogram:   --Irregular 5.2 x 5 mm anterior communicating artery aneurysm with a broad 3 mm neck and a leftward directed daughter bleb/pseudoaneurysm measuring 2.4 x 2.9 mm.  Technically successful coil embolization with complete angiographic closure of the daughter bleb/pseudoaneurysm as the presumed ruptured site, and good coil packing of the aneurysm proper, with mild persistent interstitial filling.    CT head 1/24/17:  --Interval endovascular coiling, otherwise no significant change. There is some persistent mild ventriculomegaly which is most likely due to mild hydrocephalus.    TCD 1/28/17:  --No evidence of vasospasm.        Time Spent on This Encounter     Time:   Neurocritical care time:  I spent 45 minutes bedside and on the inpatient unit today managing the neurocritical care of Malvin Au in relation to the issues listed in this note. Patient has very high risk of deterioration

## 2017-01-28 NOTE — PLAN OF CARE
Problem: Goal Outcome Summary  Goal: Goal Outcome Summary  OT - Pt moving with CGA but quickly fatigues, some difficulty initiating self-care tasks, benefits from specific cueing. Continue to anticipate discharge home with A of spouse/family.

## 2017-01-28 NOTE — PROGRESS NOTES
"[facility] Interventional Neuroradiology:    S:  \"I'm tired.\"    O: Visit Vital Signs:   /98 mmHg  Pulse 76  Temp(Src) 99.8  F (37.7  C) (Oral)  Resp 13  Ht 1.829 m (6')  Wt 101 kg (222 lb 10.6 oz)  BMI 30.19 kg/m2  SpO2 95%    Intake/Output Summary (Last 24 hours) at 01/28/17 0953  Last data filed at 01/28/17 0900   Gross per 24 hour   Intake   3385 ml   Output   4475 ml   Net  -1090 ml     Imaging:  TCD's today ~  No evidence of vasospasm.    Labs:  Sodium = 126    Exam:  Resting quietly in bed.  Awakens easily, oriented x 3.  Speech is clear and fluent.  Endorses headache and fatigue.  Tolerating activity and PO intake.  No focal weakness.    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  This aneurysm was treated with successful endovascular coil embolization on 1/23/2017.  Mild, stable hydrocephalus.  No evidence for secondary cerebral vasospasm at this time.  Malvin is neurologically stable without focal deficits.  Hyponatremia secondary to cerebral salt wasting - on 1.5% saline and salt tablets.    P:  Continue ICU care with SAH protocols.  Appreciate NCC management per Dr. Chang.  TCD's to monitor for cerebral vasospasm.  Replace Sodium per protocol.  We will follow along.  Please call with any questions or concerns.  [phone]    Gloria Bustos CNP    "

## 2017-01-28 NOTE — PLAN OF CARE
Problem: Individualization  Goal: Patient Preferences  Outcome: No Change  Remains alert and oriented only neurological deficit remains is the headache.  PRN tramadol, oxycodone and tylenol keeps pain rating between a 4-6.  Pt able to sleep between cares.  Pt independently up to edge of bed to void and with minimal SBA of spouse.  Gait steady.  Just around 2L urine output during the past 12hrs.  1.5% NA+ I.V. infusing at 125ml/hr t/o night.

## 2017-01-29 ENCOUNTER — APPOINTMENT (OUTPATIENT)
Dept: PHYSICAL THERAPY | Facility: CLINIC | Age: 42
DRG: 021 | End: 2017-01-29
Attending: INTERNAL MEDICINE
Payer: COMMERCIAL

## 2017-01-29 ENCOUNTER — APPOINTMENT (OUTPATIENT)
Dept: ULTRASOUND IMAGING | Facility: CLINIC | Age: 42
DRG: 021 | End: 2017-01-29
Attending: PSYCHIATRY & NEUROLOGY
Payer: COMMERCIAL

## 2017-01-29 LAB
ERYTHROCYTE [DISTWIDTH] IN BLOOD BY AUTOMATED COUNT: 12.5 % (ref 10–15)
HCT VFR BLD AUTO: 37.6 % (ref 40–53)
HGB BLD-MCNC: 14.1 G/DL (ref 13.3–17.7)
MCH RBC QN AUTO: 32.5 PG (ref 26.5–33)
MCHC RBC AUTO-ENTMCNC: 37.5 G/DL (ref 31.5–36.5)
MCV RBC AUTO: 87 FL (ref 78–100)
OSMOLALITY SERPL: 254 MMOL/KG (ref 275–295)
OSMOLALITY SERPL: 257 MMOL/KG (ref 275–295)
OSMOLALITY SERPL: 260 MMOL/KG (ref 275–295)
OSMOLALITY SERPL: 265 MMOL/KG (ref 275–295)
PLATELET # BLD AUTO: 236 10E9/L (ref 150–450)
RBC # BLD AUTO: 4.34 10E12/L (ref 4.4–5.9)
SODIUM SERPL-SCNC: 124 MMOL/L (ref 133–144)
SODIUM SERPL-SCNC: 125 MMOL/L (ref 133–144)
SODIUM SERPL-SCNC: 126 MMOL/L (ref 133–144)
SODIUM SERPL-SCNC: 127 MMOL/L (ref 133–144)
WBC # BLD AUTO: 9.8 10E9/L (ref 4–11)

## 2017-01-29 PROCEDURE — 25000125 ZZHC RX 250: Performed by: PSYCHIATRY & NEUROLOGY

## 2017-01-29 PROCEDURE — 36415 COLL VENOUS BLD VENIPUNCTURE: CPT | Performed by: INTERNAL MEDICINE

## 2017-01-29 PROCEDURE — 99232 SBSQ HOSP IP/OBS MODERATE 35: CPT | Performed by: INTERNAL MEDICINE

## 2017-01-29 PROCEDURE — 25000132 ZZH RX MED GY IP 250 OP 250 PS 637: Performed by: INTERNAL MEDICINE

## 2017-01-29 PROCEDURE — 97116 GAIT TRAINING THERAPY: CPT | Mod: GP | Performed by: PHYSICAL THERAPIST

## 2017-01-29 PROCEDURE — 85027 COMPLETE CBC AUTOMATED: CPT | Performed by: PSYCHIATRY & NEUROLOGY

## 2017-01-29 PROCEDURE — 40000193 ZZH STATISTIC PT WARD VISIT: Performed by: PHYSICAL THERAPIST

## 2017-01-29 PROCEDURE — 36415 COLL VENOUS BLD VENIPUNCTURE: CPT | Performed by: PSYCHIATRY & NEUROLOGY

## 2017-01-29 PROCEDURE — 93886 INTRACRANIAL COMPLETE STUDY: CPT

## 2017-01-29 PROCEDURE — 84295 ASSAY OF SERUM SODIUM: CPT | Performed by: PSYCHIATRY & NEUROLOGY

## 2017-01-29 PROCEDURE — 83930 ASSAY OF BLOOD OSMOLALITY: CPT | Performed by: INTERNAL MEDICINE

## 2017-01-29 PROCEDURE — 27210995 ZZH RX 272: Performed by: PSYCHIATRY & NEUROLOGY

## 2017-01-29 PROCEDURE — 84295 ASSAY OF SERUM SODIUM: CPT | Performed by: INTERNAL MEDICINE

## 2017-01-29 PROCEDURE — 97161 PT EVAL LOW COMPLEX 20 MIN: CPT | Mod: GP | Performed by: PHYSICAL THERAPIST

## 2017-01-29 PROCEDURE — 25000132 ZZH RX MED GY IP 250 OP 250 PS 637: Performed by: PSYCHIATRY & NEUROLOGY

## 2017-01-29 PROCEDURE — 12000000 ZZH R&B MED SURG/OB

## 2017-01-29 PROCEDURE — 25000132 ZZH RX MED GY IP 250 OP 250 PS 637: Performed by: RADIOLOGY

## 2017-01-29 PROCEDURE — 83930 ASSAY OF BLOOD OSMOLALITY: CPT | Performed by: PSYCHIATRY & NEUROLOGY

## 2017-01-29 RX ORDER — AMOXICILLIN 250 MG
1 CAPSULE ORAL 2 TIMES DAILY
Status: DISCONTINUED | OUTPATIENT
Start: 2017-01-29 | End: 2017-02-01 | Stop reason: HOSPADM

## 2017-01-29 RX ORDER — POLYETHYLENE GLYCOL 3350 17 G/17G
17 POWDER, FOR SOLUTION ORAL DAILY
Status: DISCONTINUED | OUTPATIENT
Start: 2017-01-29 | End: 2017-02-01 | Stop reason: HOSPADM

## 2017-01-29 RX ORDER — LORAZEPAM 2 MG/ML
.5-1 INJECTION INTRAMUSCULAR EVERY 4 HOURS PRN
Status: DISCONTINUED | OUTPATIENT
Start: 2017-01-29 | End: 2017-02-01 | Stop reason: HOSPADM

## 2017-01-29 RX ORDER — BISACODYL 10 MG
10 SUPPOSITORY, RECTAL RECTAL DAILY PRN
Status: DISCONTINUED | OUTPATIENT
Start: 2017-01-29 | End: 2017-02-01 | Stop reason: HOSPADM

## 2017-01-29 RX ORDER — SORBITOL SOLUTION 70 %
30 SOLUTION, ORAL MISCELLANEOUS 2 TIMES DAILY PRN
Status: DISCONTINUED | OUTPATIENT
Start: 2017-01-29 | End: 2017-02-01 | Stop reason: HOSPADM

## 2017-01-29 RX ADMIN — OXYCODONE HYDROCHLORIDE 10 MG: 5 TABLET ORAL at 12:31

## 2017-01-29 RX ADMIN — Medication 1000 MCG: at 08:54

## 2017-01-29 RX ADMIN — SODIUM CHLORIDE TAB 1 GM 1 G: 1 TAB at 18:03

## 2017-01-29 RX ADMIN — HYDRALAZINE HYDROCHLORIDE 10 MG: 20 INJECTION INTRAMUSCULAR; INTRAVENOUS at 07:57

## 2017-01-29 RX ADMIN — SENNOSIDES AND DOCUSATE SODIUM 1 TABLET: 8.6; 5 TABLET ORAL at 10:01

## 2017-01-29 RX ADMIN — TRAMADOL HYDROCHLORIDE 50 MG: 50 TABLET, COATED ORAL at 16:44

## 2017-01-29 RX ADMIN — OXYCODONE HYDROCHLORIDE 10 MG: 5 TABLET ORAL at 00:10

## 2017-01-29 RX ADMIN — NIMODIPINE 60 MG: 30 CAPSULE ORAL at 13:50

## 2017-01-29 RX ADMIN — ACETAMINOPHEN 650 MG: 325 TABLET, FILM COATED ORAL at 03:42

## 2017-01-29 RX ADMIN — OXYCODONE HYDROCHLORIDE 10 MG: 5 TABLET ORAL at 15:40

## 2017-01-29 RX ADMIN — ACETAMINOPHEN 650 MG: 325 TABLET, FILM COATED ORAL at 08:03

## 2017-01-29 RX ADMIN — LEVETIRACETAM 500 MG: 500 TABLET, FILM COATED ORAL at 21:52

## 2017-01-29 RX ADMIN — NIMODIPINE 60 MG: 30 CAPSULE ORAL at 06:28

## 2017-01-29 RX ADMIN — TRAMADOL HYDROCHLORIDE 50 MG: 50 TABLET, COATED ORAL at 09:56

## 2017-01-29 RX ADMIN — ACETAMINOPHEN 650 MG: 325 TABLET, FILM COATED ORAL at 21:52

## 2017-01-29 RX ADMIN — SODIUM CHLORIDE TAB 1 GM 1 G: 1 TAB at 08:54

## 2017-01-29 RX ADMIN — ACETAMINOPHEN 650 MG: 325 TABLET, FILM COATED ORAL at 13:50

## 2017-01-29 RX ADMIN — OXYCODONE HYDROCHLORIDE 10 MG: 5 TABLET ORAL at 03:42

## 2017-01-29 RX ADMIN — OXYCODONE HYDROCHLORIDE 10 MG: 5 TABLET ORAL at 07:51

## 2017-01-29 RX ADMIN — SODIUM CHLORIDE: 234 INJECTION INTRAMUSCULAR; INTRAVENOUS; SUBCUTANEOUS at 03:39

## 2017-01-29 RX ADMIN — SODIUM CHLORIDE: 234 INJECTION INTRAMUSCULAR; INTRAVENOUS; SUBCUTANEOUS at 11:02

## 2017-01-29 RX ADMIN — POLYETHYLENE GLYCOL 3350 17 G: 17 POWDER, FOR SOLUTION ORAL at 10:01

## 2017-01-29 RX ADMIN — TRAMADOL HYDROCHLORIDE 50 MG: 50 TABLET, COATED ORAL at 06:28

## 2017-01-29 RX ADMIN — NIMODIPINE 60 MG: 30 CAPSULE ORAL at 21:52

## 2017-01-29 RX ADMIN — NIMODIPINE 60 MG: 30 CAPSULE ORAL at 02:11

## 2017-01-29 RX ADMIN — SENNOSIDES AND DOCUSATE SODIUM 1 TABLET: 8.6; 5 TABLET ORAL at 21:52

## 2017-01-29 RX ADMIN — ACETAMINOPHEN 650 MG: 325 TABLET, FILM COATED ORAL at 17:53

## 2017-01-29 RX ADMIN — ACETAMINOPHEN 650 MG: 325 TABLET, FILM COATED ORAL at 00:10

## 2017-01-29 RX ADMIN — LEVETIRACETAM 500 MG: 500 TABLET, FILM COATED ORAL at 08:54

## 2017-01-29 RX ADMIN — NIMODIPINE 60 MG: 30 CAPSULE ORAL at 09:56

## 2017-01-29 RX ADMIN — NIMODIPINE 60 MG: 30 CAPSULE ORAL at 18:02

## 2017-01-29 RX ADMIN — OXYCODONE HYDROCHLORIDE 10 MG: 5 TABLET ORAL at 21:52

## 2017-01-29 RX ADMIN — OXYCODONE HYDROCHLORIDE 10 MG: 5 TABLET ORAL at 18:41

## 2017-01-29 RX ADMIN — SODIUM CHLORIDE TAB 1 GM 1 G: 1 TAB at 12:31

## 2017-01-29 RX ADMIN — SODIUM CHLORIDE: 234 INJECTION INTRAMUSCULAR; INTRAVENOUS; SUBCUTANEOUS at 17:39

## 2017-01-29 ASSESSMENT — VISUAL ACUITY
OU: NORMAL ACUITY

## 2017-01-29 ASSESSMENT — PAIN DESCRIPTION - DESCRIPTORS
DESCRIPTORS: HEADACHE
DESCRIPTORS: HEADACHE

## 2017-01-29 NOTE — PLAN OF CARE
Problem: Goal Outcome Summary  Goal: Goal Outcome Summary  OT - Attempted to see pt for scheduled session, however, pt is sleeping and per spouse he needs to rest.

## 2017-01-29 NOTE — PROGRESS NOTES
[facility] Interventional Neuroradiology:    S:   Feeling about the same today.    O: Visit Vital Signs:   /96 mmHg  Pulse 76  Temp(Src) 99.6  F (37.6  C) (Oral)  Resp 16  Ht 1.829 m (6')  Wt 101.2 kg (223 lb 1.7 oz)  BMI 30.25 kg/m2  SpO2 97%    Intake/Output Summary (Last 24 hours) at 01/29/17 1044  Last data filed at 01/29/17 0700   Gross per 24 hour   Intake   2850 ml   Output   3475 ml   Net   -625 ml     Imaging:  TCD's today ~  No evidence of vasospasm.     Labs:  Sodium levels:     Ref. Range 1/28/2017 06:00 1/28/2017 11:39 1/28/2017 18:43 1/28/2017 23:55 1/29/2017 06:15   Sodium Latest Ref Range: 133-144 mmol/L 126 (L) 127 (L) 125 (L) 127 (L) 126 (L)       Exam:  Awake, ambulated with assist of Physical Therapy.  Generalized weakness, nothing focal.  Complains of intermittent headache and fatigue.  Oriented x 3.  Speech is clear.  Planning to move out of ICU.    A:  This is a 41 year old male with SAH - post hemorrhage day # 6 from a ruptured anterior communicating artery aneurysm.  This aneurysm was treated with successful endovascular coil embolization on 1/23/2017.  Mild, stable hydrocephalus.  No evidence for secondary cerebral vasospasm at this time.  Malvin is neurologically stable without focal deficits.  Refractory hyponatremia secondary to cerebral salt wasting - on 1.5% saline and salt tablets.    P:  Appreciate NCC management per Dr. Chang.  TCD's to monitor for cerebral vasospasm.  Recommend aggressive replacement of Sodium and continued monitoring.  We will follow along.  Please call with any questions or concerns.  [phone]    Gloria Bustos, SANDOVAL

## 2017-01-29 NOTE — PROGRESS NOTES
01/29/17 0900   Quick Adds   Type of Visit Initial PT Evaluation   Living Environment   Lives With child(amy), dependent;spouse   Living Arrangements house   Home Accessibility stairs to enter home;stairs within home   Number of Stairs to Enter Home 5   Number of Stairs Within Home 18   Transportation Available car;family or friend will provide  (Patient drives but wife can if needed. )   Living Environment Comment Patient works delivering food for    Self-Care   Dominant Hand right   Usual Activity Tolerance excellent   Current Activity Tolerance moderate   Equipment Currently Used at Home none   Activity/Exercise/Self-Care Comment 0   Functional Level Prior   Ambulation 0-->independent   Transferring 0-->independent   Toileting 0-->independent   Bathing 0-->independent   Dressing 0-->independent   Eating 0-->independent   Communication 0-->understands/communicates without difficulty   Swallowing 0-->swallows foods/liquids without difficulty   Cognition 0 - no cognition issues reported   Fall history within last six months no   Which of the above functional risks had a recent onset or change? none   General Information   Onset of Illness/Injury or Date of Surgery - Date 01/23/17   Referring Physician Demetrius Alan   Patient/Family Goals Statement To go home.   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 He was found to have subarachnoid hemorrhage and had coiling.    Precautions/Limitations fall precautions   Weight-Bearing Status - LUE full weight-bearing   Weight-Bearing Status - RUE full weight-bearing   Weight-Bearing Status - LLE full weight-bearing   Weight-Bearing Status - RLE full weight-bearing   General Observations Patients wife present and supportive.    Cognitive Status Examination   Orientation orientation to person, place and time   Level of Consciousness alert   Follows  Commands and Answers Questions 100% of the time   Personal Safety and Judgment intact   Memory intact   Pain Assessment   Patient Currently in Pain Yes, see Vital Sign flowsheet  (Headache 3-4 throughout session. )   Integumentary/Edema   Integumentary/Edema no deficits were identifed   Posture    Posture Not impaired   Range of Motion (ROM)   ROM Quick Adds No deficits were identified   Strength   Manual Muscle Testing Quick Adds No deficits were identified   Bed Mobility   Bed Mobility Comments Modified independent with use of rail.    Transfer Skills   Transfer Comments Supervision sit to/from stand from bed.    Gait   Gait Comments Gait without AD in orozco. Can perform dynamic challenges such as head turns without LOB or deviation but increases headache. Speed is slow per therapist observation and unable to change speed much when cued. Will time next session.    Balance   Balance Comments Good static standing, rhomberg -, rhomberg eyes closed with mild sway, SLS -6 seconds on left and 4 on right, able to retrieve and object from the floor without LOB.    Sensory Examination   Sensory Perception no deficits were identified   Coordination   Coordination no deficits were identified   Muscle Tone   Muscle Tone no deficits were identified   General Therapy Interventions   Planned Therapy Interventions bed mobility training;gait training;neuromuscular re-education   Clinical Impression   Criteria for Skilled Therapeutic Intervention yes, treatment indicated   PT Diagnosis Mildly impaired balance and decreased gait speed.    Influenced by the following impairments Headache   Functional limitations due to impairments Decreased speed with gait, impaired ability for age to perform SLS.    Clinical Presentation Stable/Uncomplicated   Clinical Presentation Rationale Current and prior level of function, stable medical condition.    Clinical Decision Making (Complexity) Low complexity   Therapy Frequency` daily   Predicted  "Duration of Therapy Intervention (days/wks) 3 days   Anticipated Equipment Needs at Discharge (none)   Anticipated Discharge Disposition Home   Risk & Benefits of therapy have been explained Yes   Patient, Family & other staff in agreement with plan of care Yes   [facility] TM \"6 Clicks\"   2016, Trustees of [facility], under license to [employer].  All rights reserved.   6 Clicks Short Forms Basic Mobility Inpatient Short Form   [facility]  \"6 Clicks\" V.2 Basic Mobility Inpatient Short Form   1. Turning from your back to your side while in a flat bed without using bedrails? 4 - None   2. Moving from lying on your back to sitting on the side of a flat bed without using bedrails? 4 - None   3. Moving to and from a bed to a chair (including a wheelchair)? 3 - A Little   4. Standing up from a chair using your arms (e.g., wheelchair, or bedside chair)? 4 - None   5. To walk in hospital room? 3 - A Little   6. Climbing 3-5 steps with a railing? 3 - A Little   Basic Mobility Raw Score (Score out of 24.Lower scores equate to lower levels of function) 21   Total Evaluation Time   Total Evaluation Time (Minutes) 18     "

## 2017-01-29 NOTE — PLAN OF CARE
Problem: Goal Outcome Summary  Goal: Goal Outcome Summary  Outcome: Improving  Transferred from ICU late this morning. A&O times 4. Neuros intact. VSS, except hypertensive, within parameters. Tele NSR. Up independently in room. Oxycodone and Tylenol given for headache. Also rest encouraged to decrease headache, as patient had many visitors. Voiding adequately. IVF with sodium infusing, sodium checks every 6  Hours. Continue to monitor.

## 2017-01-29 NOTE — PLAN OF CARE
Problem: Goal Outcome Summary  Goal: Goal Outcome Summary  PT-Patient seen for initial eval and treatment initiated. 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 He lives in a 2 story home with his wife and 2 dependent children. Currently he is rating his headache 3-4 and therefore a little groggy and slow with activity. Independent with sup to sit and sit to stand but again does with decreased speed. Tolerated amb 200 feet without AD-slow speed. Able to manage dynamic challenges such as head turns without deviation but this increases his headache. Decreased balance noted with difficulty standing on one leg and with slight instability with balance challenges when eyes closed. Balance deficits appear to be due to headache and lack of activity during hospital stay. Feel patient would benefit from continued skilled PT (don't anticipate many sessions needed) to progress his activity tolerance and balance back to baseline. Recommend disch to home. Do not anticipate any therapy will be needed after discharge.

## 2017-01-29 NOTE — PROGRESS NOTES
[facility]    Hospitalist Progress Note    Date of Service (when I saw the patient): 01/29/2017    Assessment and Plan  Malvin Au is a 41 year old male who was admitted on 1/23/2017.    1. Subarachnoid hemmorhage: donovan grade II, Mcguire-Salmeron grade II SAH secondary to ruptured anterior commincating artery aneurysm  --s/p coiling t.2 mm AcomA aneuyrsm on 1/23/17  --Doing well overall.   PT, OT, SLP consulted. Transcranial dopplers daily to monitor for vasospasm. Discussed with Dr. Alvarez today, who recommends transfer from ICU to Neuro floor    2. Hypertensive emergency:   --Now on nimodipine. Neurocritical care continues to recommend Systolic goal of <180.    3. Hyponatremia:.      Ref. Range 1/27/2017 06:20 1/27/2017 12:20 1/27/2017 18:03 1/28/2017 00:10 1/28/2017 06:00 1/28/2017 11:39 1/28/2017 18:43 1/28/2017 23:55 1/29/2017 06:15   Sodium Latest Ref Range: 133-144 mmol/L 128 (L) 128 (L) 129 (L) 127 (L) 126 (L) 127 (L) 125 (L) 127 (L) 126 (L)     Patient is asymptomatic, except poor appetite.     Salt tablets added by Neurocritical care team 1/28. Remains on 1.5% saline at 75 cc/hr   Discussed with Dr. Alvarez today, who recommends continuing current regimen. Checking Na every 6 hours.     4. Troponin elevation: likley secondary to #1 and #2. TTE showed mild LVH, negative bubble study.     5. Convulsions Secondary to #1: on keppra.     6. Nausea and impaired appetite.  Improving.   Continue IV ativan and zofran, prn compazine and reglan.     6.  Skin lesions: Concern for celiac disease: Wife is concerned that patient has celiac disease as he has other family members with this condition    Transglutaminase IgG and IgA are negative. Would defer further workup to outpatient setting if symptoms of diarhea, malabsorption present. Given strong family history, have advised family to consider further workup via GI if indicated.     7. Constipation: multifactorial. no BM since 1/24.   --will add  Miralax, scheduled senna/docusate. Add prn sorbitol.     DVT Prophylaxis: Pneumatic Compression Devices  Code Status: Full Code    Disposition: Expected discharge in 3-5 days . May benefit from an acute rehab stay, awaiting PT eval.     Demetrius Farah MD  [phone] (P)  Text Page (7 am to 6 pm)    Interval History      Wife and sister-in-law at bedside.    Was able to take a few steps with RN today. Feels a little improved. Nausea is better, but appetite is poor.     PT consult still pending.     NO BM since Tuesday.     Discussed with DUC Fisher.     Discussed with Dr. Alvarez.     -Data reviewed today: I reviewed all new labs and imaging results over the last 24 hours    dPhysical Exam  Temp: 99.6  F (37.6  C) Temp src: Oral BP: (!) 137/96 mmHg   Heart Rate: 72 Resp: 16 SpO2: 97 % O2 Device: None (Room air)    Filed Vitals:    01/23/17 1133 01/28/17 0600 01/29/17 0600   Weight: 104.327 kg (230 lb) 101 kg (222 lb 10.6 oz) 101.2 kg (223 lb 1.7 oz)     Vital Signs with Ranges  Temp:  [97.7  F (36.5  C)-99.9  F (37.7  C)] 99.6  F (37.6  C)  Heart Rate:  [63-84] 72  Resp:  [9-17] 16  BP: (125-151)/() 137/96 mmHg  SpO2:  [94 %-98 %] 97 %  I/O last 3 completed shifts:  In: 3200 [P.O.:320; I.V.:2880]  Out: 4075 [Urine:4075]    Constitutional: Alert, No acute distress  HEENT: no facial asymmetry. EOMI  Respiratory: Clear to auscultation bilaterally, no crackles  Cardiovascular: Regular rate and rhythm, S1, S2, no murmurs  GI: Abdomen soft, nontender, nondistended, bowel sounds are heard  Skin/Integumen: No jaundice, no suspicious rash  NEURO: no focal deficits.      Medications    IV infusion builder WITH LARGE additive list 150 mL/hr at 01/29/17 0339     niCARdipine 40 mg in 200 mL 0.9% NaCl Stopped (01/24/17 1021)     - MEDICATION INSTRUCTIONS -         polyethylene glycol  17 g Oral Daily     senna-docusate  1 tablet Oral BID     sodium chloride  1 g Oral TID w/meals     cyanocobalamin  1,000 mcg Sublingual  Daily     levETIRAcetam  500 mg Oral BID     niMODipine  60 mg Oral Q4H     ondansetron  4 mg Intravenous Q6H ZACKERY     sodium chloride (PF)  3 mL Intracatheter Q8H       Data    Recent Labs  Lab 01/29/17  0615 01/28/17  2355 01/28/17  1843  01/28/17  0600  01/27/17  0400  01/26/17  0500 01/24/17  2325 01/24/17  1724 01/24/17  1135 01/24/17  0455  01/23/17  0915   WBC 9.8  --   --   --  10.5  --  12.8*  < >  --   --   --   --  11.6*  --  6.3   HGB 14.1  --   --   --  13.6  --  13.7  < >  --   --   --   --  13.0*  --  14.4   MCV 87  --   --   --  87  --  87  < >  --   --   --   --  90  --  89     --   --   --  227  --  222  < >  --   --   --   --  196  --  180   * 127* 125*  < > 126*  < > 128*  < > 127*  --   --   --  137  --  140   POTASSIUM  --   --   --   --   --   --   --   --  3.6  --   --   --  3.6  --  3.5   CHLORIDE  --   --   --   --   --   --   --   --  95  --   --   --  102  --  105   CO2  --   --   --   --   --   --   --   --  25  --   --   --  26  --  29   BUN  --   --   --   --   --   --   --   --  10  --   --   --  6*  --  12   CR  --   --   --   --   --   --   --   --  0.58*  --   --   --  0.64*  --  0.68   ANIONGAP  --   --   --   --   --   --   --   --  7  --   --   --  9  --  6   RADHA  --   --   --   --   --   --   --   --  8.4*  --   --   --  8.0*  --  8.6   GLC  --   --   --   --   --   --   --   --  115*  --   --   --  132*  --  124*   TROPI  --   --   --   --   --   --   --   --   --  0.084* 0.062* 0.095* 0.095*  < >  --    < > = values in this interval not displayed.    Recent Results (from the past 24 hour(s))   US Transcranial Doppler Complete Port    Narrative    TRANSCRANIAL DOPPLER ULTRASOUND   1/29/2017 8:21 AM     COMPARISON: Transcranial Doppler results 1/28/2017.    HISTORY: Vasospasm.    FINDINGS:  Mean velocities are as follows:    Right M1:   61 cm/sec; pulsatility index: 1.3; Lindegaard index: 1.6   Right A1:    38 cm/sec.  Right PCA: 35 cm/sec.  Right ICA:   39 cm/sec.      Left M1:   67 cm/sec; pulsatility index: 1.1; Lindegaard index: 1.9   Left A1:    63 cm/sec.  Left PCA: 25 cm/sec.  Left ICA:   36 cm/sec.     Basilar:  36 cm/sec.      Impression    IMPRESSION: No evidence of vasospasm.

## 2017-01-29 NOTE — PLAN OF CARE
Problem: Individualization  Goal: Patient Preferences  Outcome: No Change  Pt alert, conversant and neurologically intact despite continued c/o of headache rated between 3/4.  PRNs tylenol, tramadol and oxycodone effective in keeping headache controlled.  1.5% saline infusion titrated to 150ml/hr based on serial Na+'s.  Pt up to edge of bed to void, urine clear.  Pt's spouse at bedside, supportive with cares and pt's needs.  Updated t/o shift.

## 2017-01-29 NOTE — PROGRESS NOTES
Pt alert, oriented neurologically intact despite persistent c/o headache relieved with PRN Oxycodone, tylenol and tramadol overnight.  Spouse at bedside, very supportive with cares.  1.5% saline infusion continues at rate of 150ml/hr per titration protocol/sodium levels.  Pt up to edge of bed to use urinal and independent with repositioning self in bed.

## 2017-01-30 ENCOUNTER — APPOINTMENT (OUTPATIENT)
Dept: ULTRASOUND IMAGING | Facility: CLINIC | Age: 42
DRG: 021 | End: 2017-01-30
Attending: PSYCHIATRY & NEUROLOGY
Payer: COMMERCIAL

## 2017-01-30 ENCOUNTER — APPOINTMENT (OUTPATIENT)
Dept: PHYSICAL THERAPY | Facility: CLINIC | Age: 42
DRG: 021 | End: 2017-01-30
Payer: COMMERCIAL

## 2017-01-30 LAB
ERYTHROCYTE [DISTWIDTH] IN BLOOD BY AUTOMATED COUNT: 12.5 % (ref 10–15)
HCT VFR BLD AUTO: 35.5 % (ref 40–53)
HGB BLD-MCNC: 13.2 G/DL (ref 13.3–17.7)
MCH RBC QN AUTO: 32.3 PG (ref 26.5–33)
MCHC RBC AUTO-ENTMCNC: 37.2 G/DL (ref 31.5–36.5)
MCV RBC AUTO: 87 FL (ref 78–100)
OSMOLALITY SERPL: 259 MMOL/KG (ref 275–295)
OSMOLALITY SERPL: 263 MMOL/KG (ref 275–295)
OSMOLALITY SERPL: 265 MMOL/KG (ref 275–295)
OSMOLALITY SERPL: 266 MMOL/KG (ref 275–295)
PLATELET # BLD AUTO: 187 10E9/L (ref 150–450)
RBC # BLD AUTO: 4.09 10E12/L (ref 4.4–5.9)
SODIUM SERPL-SCNC: 125 MMOL/L (ref 133–144)
SODIUM SERPL-SCNC: 127 MMOL/L (ref 133–144)
SODIUM SERPL-SCNC: 127 MMOL/L (ref 133–144)
SODIUM SERPL-SCNC: 128 MMOL/L (ref 133–144)
WBC # BLD AUTO: 7.9 10E9/L (ref 4–11)

## 2017-01-30 PROCEDURE — 12000000 ZZH R&B MED SURG/OB

## 2017-01-30 PROCEDURE — 99232 SBSQ HOSP IP/OBS MODERATE 35: CPT | Performed by: INTERNAL MEDICINE

## 2017-01-30 PROCEDURE — 84295 ASSAY OF SERUM SODIUM: CPT | Performed by: INTERNAL MEDICINE

## 2017-01-30 PROCEDURE — 25000132 ZZH RX MED GY IP 250 OP 250 PS 637: Performed by: INTERNAL MEDICINE

## 2017-01-30 PROCEDURE — 40000193 ZZH STATISTIC PT WARD VISIT

## 2017-01-30 PROCEDURE — 83930 ASSAY OF BLOOD OSMOLALITY: CPT | Performed by: INTERNAL MEDICINE

## 2017-01-30 PROCEDURE — 25000125 ZZHC RX 250: Performed by: PSYCHIATRY & NEUROLOGY

## 2017-01-30 PROCEDURE — 27210995 ZZH RX 272: Performed by: PSYCHIATRY & NEUROLOGY

## 2017-01-30 PROCEDURE — 36415 COLL VENOUS BLD VENIPUNCTURE: CPT | Performed by: INTERNAL MEDICINE

## 2017-01-30 PROCEDURE — 25000132 ZZH RX MED GY IP 250 OP 250 PS 637: Performed by: PSYCHIATRY & NEUROLOGY

## 2017-01-30 PROCEDURE — 25000132 ZZH RX MED GY IP 250 OP 250 PS 637: Performed by: RADIOLOGY

## 2017-01-30 PROCEDURE — 93886 INTRACRANIAL COMPLETE STUDY: CPT

## 2017-01-30 PROCEDURE — 97116 GAIT TRAINING THERAPY: CPT | Mod: GP

## 2017-01-30 PROCEDURE — 97750 PHYSICAL PERFORMANCE TEST: CPT | Mod: GP

## 2017-01-30 PROCEDURE — 85027 COMPLETE CBC AUTOMATED: CPT | Performed by: INTERNAL MEDICINE

## 2017-01-30 RX ORDER — GINSENG 100 MG
CAPSULE ORAL EVERY 8 HOURS PRN
Status: DISCONTINUED | OUTPATIENT
Start: 2017-01-30 | End: 2017-02-01 | Stop reason: HOSPADM

## 2017-01-30 RX ADMIN — OXYCODONE HYDROCHLORIDE 5 MG: 5 TABLET ORAL at 02:55

## 2017-01-30 RX ADMIN — Medication 1000 MCG: at 08:00

## 2017-01-30 RX ADMIN — TRAMADOL HYDROCHLORIDE 50 MG: 50 TABLET, COATED ORAL at 00:27

## 2017-01-30 RX ADMIN — NIMODIPINE 60 MG: 30 CAPSULE ORAL at 09:59

## 2017-01-30 RX ADMIN — ACETAMINOPHEN 650 MG: 325 TABLET, FILM COATED ORAL at 16:54

## 2017-01-30 RX ADMIN — NIMODIPINE 60 MG: 30 CAPSULE ORAL at 06:49

## 2017-01-30 RX ADMIN — BACITRACIN: 500 OINTMENT TOPICAL at 20:51

## 2017-01-30 RX ADMIN — OXYCODONE HYDROCHLORIDE 10 MG: 5 TABLET ORAL at 13:00

## 2017-01-30 RX ADMIN — SODIUM CHLORIDE: 234 INJECTION INTRAMUSCULAR; INTRAVENOUS; SUBCUTANEOUS at 00:03

## 2017-01-30 RX ADMIN — SENNOSIDES AND DOCUSATE SODIUM 1 TABLET: 8.6; 5 TABLET ORAL at 08:01

## 2017-01-30 RX ADMIN — OXYCODONE HYDROCHLORIDE 10 MG: 5 TABLET ORAL at 16:54

## 2017-01-30 RX ADMIN — ACETAMINOPHEN 650 MG: 325 TABLET, FILM COATED ORAL at 02:54

## 2017-01-30 RX ADMIN — ACETAMINOPHEN 650 MG: 325 TABLET, FILM COATED ORAL at 21:21

## 2017-01-30 RX ADMIN — TRAMADOL HYDROCHLORIDE 50 MG: 50 TABLET, COATED ORAL at 14:28

## 2017-01-30 RX ADMIN — ACETAMINOPHEN 650 MG: 325 TABLET, FILM COATED ORAL at 06:48

## 2017-01-30 RX ADMIN — SODIUM CHLORIDE TAB 1 GM 1 G: 1 TAB at 18:09

## 2017-01-30 RX ADMIN — LEVETIRACETAM 500 MG: 500 TABLET, FILM COATED ORAL at 08:01

## 2017-01-30 RX ADMIN — OXYCODONE HYDROCHLORIDE 10 MG: 5 TABLET ORAL at 06:48

## 2017-01-30 RX ADMIN — POLYETHYLENE GLYCOL 3350 17 G: 17 POWDER, FOR SOLUTION ORAL at 07:58

## 2017-01-30 RX ADMIN — SODIUM CHLORIDE TAB 1 GM 1 G: 1 TAB at 12:33

## 2017-01-30 RX ADMIN — NIMODIPINE 60 MG: 30 CAPSULE ORAL at 02:54

## 2017-01-30 RX ADMIN — TRAMADOL HYDROCHLORIDE 50 MG: 50 TABLET, COATED ORAL at 20:42

## 2017-01-30 RX ADMIN — SENNOSIDES AND DOCUSATE SODIUM 1 TABLET: 8.6; 5 TABLET ORAL at 20:42

## 2017-01-30 RX ADMIN — SODIUM CHLORIDE: 234 INJECTION INTRAMUSCULAR; INTRAVENOUS; SUBCUTANEOUS at 08:04

## 2017-01-30 RX ADMIN — NIMODIPINE 60 MG: 30 CAPSULE ORAL at 21:21

## 2017-01-30 RX ADMIN — SODIUM CHLORIDE TAB 1 GM 1 G: 1 TAB at 08:00

## 2017-01-30 RX ADMIN — NIMODIPINE 60 MG: 30 CAPSULE ORAL at 14:10

## 2017-01-30 RX ADMIN — OXYCODONE HYDROCHLORIDE 10 MG: 5 TABLET ORAL at 21:21

## 2017-01-30 RX ADMIN — OXYCODONE HYDROCHLORIDE 10 MG: 5 TABLET ORAL at 09:59

## 2017-01-30 RX ADMIN — LEVETIRACETAM 500 MG: 500 TABLET, FILM COATED ORAL at 20:42

## 2017-01-30 RX ADMIN — NIMODIPINE 60 MG: 30 CAPSULE ORAL at 18:09

## 2017-01-30 RX ADMIN — ACETAMINOPHEN 650 MG: 325 TABLET, FILM COATED ORAL at 11:55

## 2017-01-30 ASSESSMENT — VISUAL ACUITY
OU: NORMAL ACUITY

## 2017-01-30 NOTE — PLAN OF CARE
Problem: Goal Outcome Summary  Goal: Goal Outcome Summary  Outcome: No Change  A&Ox4. Neuros intact. VSS except elevated BP--within parameters. Tele NSR . Gluten free diet. Up with SBA. C/o moderate HA, decreased with tramadol, tylenol, & oxycodone. 0000 Na was 128- rate continued at 150mL/hr.

## 2017-01-30 NOTE — PROGRESS NOTES
"Interventional Neuroradiology    S: \"still have a headache\"    O: /66 mmHg  Pulse 66  Temp(Src) 98.4  F (36.9  C) (Oral)  Resp 16  Ht 1.829 m (6')  Wt 101.2 kg (223 lb 1.7 oz)  BMI 30.25 kg/m2  SpO2 96%    Intake/Output Summary (Last 24 hours) at 01/30/17 1008  Last data filed at 01/30/17 0919   Gross per 24 hour   Intake   4622 ml   Output   5275 ml   Net   -653 ml     Na; 127, on replacement     TCD: No evidence of vasospasm    Neuro intact  Headache  Working with PT  Moved to floor care yesterday    A: SAH post bleed day 7  Status post coil embolization of ruptured anterior communicating artery aneurysm  No evidence of vasospasm  Neuro intact  Headache  Hyponatremia    P: Appreciate NCC management  Continue Na replacement  We will sign off but follow along remotely at this time.  Please contact us if needed [phone]  We are planning a follow up cerebral angiogram in 6 months.  We will contact patient at that time to schedule    PRAKASH Serrano, CNP      "

## 2017-01-30 NOTE — PLAN OF CARE
Problem: Goal Outcome Summary  Goal: Goal Outcome Summary  Outcome: No Change  Alert and oriented, iv fluid infusing at 150ml/h,  Vitals are stable, regularwith gluten free diet, takes oxycodone and tylenol for headache, wife at bedside, neuro intact, sodium is 124 and will be rechecked at 12mn, has parameter for iv fluids, adequate urine output, continue to monitor.

## 2017-01-30 NOTE — PROGRESS NOTES
[facility]  [facility] [place]  Neurology Daily Note      Admission Date:1/23/2017   Date of service: 01/30/2017   Hospital Day: 8      Assessment and Plan  _______________________________  #. (I60.9) Subarachnoid hemorrhage (H)  (primary encounter diagnosis)  --Arevalo grade III, Mcguire-Salmeron grade II SAH  --Secondary to ruptured anterior communicating artery aneurysm   --no evidence of spasm on TCD 1/30/17, continue daily  --nimodipine for 21 days  #. (G91.9) Hydrocephalus  --Repeat CT head stable mild hydrocephalus  #. (R56.9) Convulsions, unspecified convulsion type (H)  --Seizure on the onset.    --Keppra for a week  #. (I10) Malignant essential hypertension  --Keep BP <180 mm Hg  #. (I60.9) Cerebral aneurysm rupture (H)  --Coiled 5.2 mm AcomA aneurysm 1/23/17  #. (E87.1) Hyponatremia  --Significant Na drop  --Very refractory to management  --stopped 1.5% saline  --Na management per sliding scale  ----Added salt tabs 1/28/17  #. PT/OT/Speech  --continue evaluations  #. Nutrition  --Per speech therapy evaluation   #. DVT Prophylaxis  --Mechanical only due to SAH    Code Status: Full Code    Disposition: Expected discharge in 1-2 days once    Interval History  _______________________________  Patient presented after being found unconscious on the loading dock at [facility] where he was making a delivery. Upon EMS arrival, he was drooling, hypertensive, and remained unresponsive. No obvious trauma or seizure activity. He was incontinent. Upon waking was confused and appeared postictal. He was found to have left leg weakness and code stroke was called. Imaging demonstrated SAH likely from AcomA aneurysm. Aneurysm coiled on 1/23/17.    Continues to have asymptomatic hyponatremia. Headache continues, not as severe. No seizures. No weakness.      Review of Systems  _______________________________  The Review of Systems is negative other than noted in the HPI  Physical  Exam  _______________________________  Vitals: Temp: 98.4  F (36.9  C) Temp src: Oral BP: (!) 153/99 mmHg Pulse: 66 Heart Rate: 74 Resp: 16 SpO2: 96 % O2 Device: None (Room air)    Vital Signs with Ranges: Temp:  [98  F (36.7  C)-98.4  F (36.9  C)] 98.4  F (36.9  C)  Pulse:  [66] 66  Heart Rate:  [68-74] 74  Resp:  [16] 16  BP: (131-162)/() 153/99 mmHg  SpO2:  [95 %-100 %] 96 %    General Appearance:  No acute distress  Neuro:       Mental Status Exam:   Awake, alert, oriented X3. Speech and language are intact. Mental status is normal       Cranial Nerves:  Pupils 3 mm, reactive. EOMI. Face sensation is normal. Face is symmetric. Tongue and uvula are midline. Other CN are normal           Motor:  5/5 X 4. Tone and bulk are normal            Reflexes:  Normal DTR. Toes downgoing.         Sensory:  Normal to light touch                   Coordination:   Intact finger-to-nose         Gait:  up with assistance   Cardiovascular: Regular rate and rhythm, no m/r/g  Lungs: Clear to auscultation  Abdomen: Soft, not tender, not distended  Extremities: No clubbing, no cyanosis, no edema    Medications  _______________________________    - MEDICATION INSTRUCTIONS -         polyethylene glycol  17 g Oral Daily     senna-docusate  1 tablet Oral BID     sodium chloride  1 g Oral TID w/meals     cyanocobalamin  1,000 mcg Sublingual Daily     levETIRAcetam  500 mg Oral BID     niMODipine  60 mg Oral Q4H     sodium chloride (PF)  3 mL Intracatheter Q8H       Data  _______________________________      Lab Data:   All data was reviewed by me personally  CBC RESULTS:  Recent Labs   Lab Test  01/30/17   0610  01/29/17   0615  01/28/17   0600   WBC  7.9  9.8  10.5   RBC  4.09*  4.34*  4.19*   HGB  13.2*  14.1  13.6   HCT  35.5*  37.6*  36.3*   PLT  187  236  227     Basic Metabolic Panel:  Recent Labs   Lab Test  01/30/17   0610  01/30/17   0005  01/29/17   1835   01/26/17   0500  01/24/17   0455  01/23/17   0915   NA  127*  128*   124*   < >  127*  137  140   POTASSIUM   --    --    --    --   3.6  3.6  3.5   CHLORIDE   --    --    --    --   95  102  105   CO2   --    --    --    --   25  26  29   BUN   --    --    --    --   10  6*  12   CR   --    --    --    --   0.58*  0.64*  0.68   GLC   --    --    --    --   115*  132*  124*   RADHA   --    --    --    --   8.4*  8.0*  8.6    < > = values in this interval not displayed.     Lipid Profile:  Recent Labs   Lab Test  01/23/17   1800   CHOL  129   HDL  43   LDL  68   TRIG  91     Thyroid Panel:  Recent Labs   Lab Test  01/23/17   1800   TSH  0.61      Vitamin B12:   Recent Labs   Lab Test  01/24/17   1135   B12  285  Quantity not sufficient      Vitamin D level:   Recent Labs   Lab Test  01/23/17   1800   VITDT  36     A1C:   Recent Labs   Lab Test  01/23/17   1800   A1C  4.6     Troponin I:   Recent Labs   Lab Test  01/24/17   2325  01/24/17   1724  01/24/17   1135  01/24/17   0455  01/23/17   1800   TROPI  0.084*  0.062*  0.095*  0.095*  0.190*     CRP inflammation:   Recent Labs   Lab Test  01/23/17   1800   CRP  <2.9        Cardiac US:   Left ventricular systolic function is normal. The visual ejection fraction is estimated at 60-65%. A contrast injection (Bubble Study) was performed that was negative for flow across the interatrial septum. Image quality was poor. Valsalva was performed. There is mild to moderate concentric left ventricular hypertrophy. The study was technically difficult. There is no comparison study available.       Imaging:   All imaging studies were reviewed personally  CT head 1/23/17:   1. Extensive subarachnoid hemorrhage in a pattern that suggests aneurysmal rupture.  2. Mild hydrocephalus.  3. No evidence for ischemic infarct.    CTP head 1/23/17:  --Normal CT perfusion of the brain.    Cerebral angiogram:   --Irregular 5.2 x 5 mm anterior communicating artery aneurysm with a broad 3 mm neck and a leftward directed daughter bleb/pseudoaneurysm measuring 2.4 x  2.9 mm.  Technically successful coil embolization with complete angiographic closure of the daughter bleb/pseudoaneurysm as the presumed ruptured site, and good coil packing of the aneurysm proper, with mild persistent interstitial filling.    CT head 1/24/17:  --Interval endovascular coiling, otherwise no significant change. There is some persistent mild ventriculomegaly which is most likely due to mild hydrocephalus.    TCD 1/29/17:  --No evidence of vasospasm.        Abigail Reyes, FNP-BC

## 2017-01-30 NOTE — PROGRESS NOTES
"SP[facility]ITUAL [facility]  Progress Note  [facility]    Length of stay visit.  Patient was sitting up in a chair and his spouse Becky was in the room.  The couple reflected on this journey over the past week.  They used the word \"miracle\" that this has turned out so well.  Patient states that he still has a headache and gets tired more easily.  He talks about his two daughters who are 10 and 12 and how they need him in their lives. They are not voicing any need for SH services at this time.   Plan: No need for SH to follow but of course we can be paged if a need arises.    Sofi Medina  Chaplain Resident  Pager [phone]                               "

## 2017-01-30 NOTE — PLAN OF CARE
Problem: Goal Outcome Summary  Goal: Goal Outcome Summary  PT: Pt with mild HA 2-3/10 at start of session, 5/10 at end of session, denies dizziness/lightheadeness/blurry or double vision. Pt demonstrates IND with supine to sit, sit<>stand. Pt ambulates 1x110' with SBA without AD, ambulates 1x250' IND without AD. Pt able to demonstrate ascending/descending 2x3 stairs with reciprocal patterning/without rails, limited by HA. Pt completed the Lovell Balance Test, scoring 52/56 (<45/56 indicates increased falls risk), pt with safe mobility demonstrated, good balance, however slowed movements with alternating foot task, turning, and difficulty maintaining SLS >8-9 seconds B. PT recommendation: disch to home. Do not anticipate any therapy will be needed after discharge. If pt is having difficulty returning to work full time, may benefit from work hardening program.     Goals updated to reflect pt progress.

## 2017-01-30 NOTE — PLAN OF CARE
Problem: Goal Outcome Summary  Goal: Goal Outcome Summary  Outcome: Improving  A&O. Neuros intact except for headache. BP slightly elevated but within SBP <180 parameters. All other VSS. Tele NSR. Na 127 at 0600 and 1200 today, continue with salt tabs TID. Gluten free diet. Up with SBA. Plan to start taking oxycodone and tylenol together and supplement with tramadol as needed to control headache pain. D/c plan pending.

## 2017-01-30 NOTE — PROGRESS NOTES
A&O times 4. Neuros intact. VSS, except hypertensive, within parameters. Tele NSR. Up with assist of one in room. Voiding in urinal. IVF infusing, 1.5 mg Na at 150 ml/hr.  Sodium tablets given. Oxycodone, Tylenol and Tramadol given for headache. Headache rated as 4-5/10. Spouse at bedside. Continue to monitor.

## 2017-01-30 NOTE — PROGRESS NOTES
"CLINICAL NUTRITION SERVICES  -  ASSESSMENT NOTE    Recommendations Ordered by Registered Dietitian (RD):   Traditional milkshakes/smoothie supplements PRN   Malnutrition: Pt does not meet two of the criteria necessary for diagnosing malnutrition.      REASON FOR ASSESSMENT  Malvin Au is a 41 year old male seen by Registered Dietitian for LOS    NUTRITION HISTORY  - Information obtained from Malvin and family.   - Pt's appetite was poor for about 1 week PTA. He had been sick with what they thought was the flu for about 5 days, and wasn't eating as much.   - He just began a gluten free diet on the 1st of the year because he is concerned he may have Celiac disease.     CURRENT NUTRITION ORDERS  Diet Order:     Gluten Free     Current Intake/Tolerance:  Pt eating 50% of adequate meals per RN documentation and meal review. Pt reports appetite is slowly improving, last night he had a popsicle and some nuts. This was the first time he really felt hungry since admission. Pt reports generally ordering meals TID. Prior to my visit, pt had drank a large milkshake. He said it tasted good and he tolerated it well.     PHYSICAL FINDINGS  Observed  No nutrition-related physical findings observed  Obtained from Chart/Interdisciplinary Team  No evidence for musculoskeletal deficit    ANTHROPOMETRICS  Height: 6' 0\"  Weight: 223 lbs 1.69 oz (101 kg)  Body mass index is 30.25 kg/(m^2).  Weight Status:  Obesity Grade I BMI 30-34.9  IBW: 80.9 kg  % IBW: 125%  Weight History: Per pt and wife, his UBW was 235# (last weighed a few months ago), and he is now down to 223#. They did not notice any dry weight or muscle loss. Possible 5% loss in the past 3-4 months.   Wt Readings from Last 10 Encounters:   01/29/17 101.2 kg (223 lb 1.7 oz)       LABS  Labs reviewed    MEDICATIONS  Medications reviewed  B12 sublingual 1000 mcg daily    Dosing Weight 86 kg - adjusted for obesity    ASSESSED NUTRITION NEEDS:  Estimated Energy Needs: " 5594-0468 kcals (25-30 Kcal/Kg)  Justification: maintenance  Estimated Protein Needs: 103-129 grams protein (1.2-1.5 g pro/Kg)  Justification: maintenance  Estimated Fluid Needs: 5487-6183 mL (1 mL/Kcal)  Justification: maintenance    MALNUTRITION:  % Weight Loss:  Weight loss does not meet criteria for malnutrition   % Intake:  </= 50% for >/= 5 days (severe malnutrition)  Subcutaneous Fat Loss:  None observed  Muscle Loss:  None observed  Fluid Retention:  None noted    Malnutrition Diagnosis: Patient does not meet two of the above criteria necessary for diagnosing malnutrition    NUTRITION DIAGNOSIS:  Inadequate oral intake related to poor appetite 2/2 nausea as evidenced by 50% intakes of meals over admission.     NUTRITION INTERVENTIONS  Recommendations / Nutrition Prescription  Regular diet   Nutritional supplements PRN    Implementation  Nutrition education: Provided education on supplements, protein foods, watching sodium for general heart health.   Medical Food Supplement: shakes or smoothies PRN    Nutrition Goals  Pt to consume at least 75% of all meals     MONITORING AND EVALUATION:  Progress towards goals will be monitored and evaluated per protocol and Practice Guidelines    Elma Middleton RD, LD        .

## 2017-01-30 NOTE — PLAN OF CARE
Problem: Goal Outcome Summary  Goal: Goal Outcome Summary  OT: pt declined participation with OT at this time secondary to c/o headache and fatigue, wanting to rest.

## 2017-01-30 NOTE — PROGRESS NOTES
[facility]  Hospitalist Progress Note  Lakeisha Fenton MD  01/30/2017    Assessment and Plan  Malvin Au is a 41 year old male with past medical hx of HTN who presented to emergency room after being found and noted to have subarachnoid hemorrhage.     Subarachnoid hemorrhage  secondary to ruptured anterior commincating artery aneurysm s/p coil embolization on 1/23/17  - donovan grade II, Hunt-Salmeron grade II SAH  - Transcranial dopplers daily to monitor for vasospasm, no spasms thus far  - continues on Nimodipine x 21 days  - on keppra BID for a week per neuro for possible seizure at presenation  - PT/OT   - neurology following, appreciate help  - pain control with oxycodone prn, tylenol prn and tramadol also available prn    Possible Convulsions Secondary to #1  Concern for possible seizure upon presentation  - on keppra for a week per neurology    Hypertensive emergency:    - continues on nimodipine and will need a different anti-hypertensive agent once 21 days is complete for better control of high BP  - Neurocritical care continues to recommend Systolic goal of <180.    Hyponateremia:  - Patient is asymptomatic, except poor appetite.   - 1.5% saline discontinued on 1/30 per neuro  - continues on Salt tabs TID    Troponin elevation likely demand ischemia secondary to SAH and hypertensive emergency  - TTE showed mild LVH, negative bubble study. Troponin peaked at 0.095    Nausea and impaired appetite.  improving  - Continue anti-emetics    Skin lesions: Concern for celiac disease:   Wife is concerned that patient has celiac disease as he has other family members with this condition  - Transglutaminase IgG and IgA are negative. Would defer further workup to outpatient setting if symptoms of diarhea, malabsorption present. Given strong family history, have advised family to consider further workup via GI if indicated.     Constipation  - no BM since 1/24  - started on bowel regimen on 1/29,  will continue    Communications: wife at bedside. Discussed with RN  DVT Prophylaxis: Pneumatic Compression Devices  Code Status: Full Code    Disposition: Expected discharge in 3-5 days when clear by neurology    30 minutes spent.     Interval History  Reports constant headache. Not worse than it has been before. Reports Saturday was better controlled when he was taking oxy and tylenol at the same time and intermittent tramadol. Denies nausea or vomiting. Denies chest pain or shortness of breath. Afebrile.     -Data reviewed today: I reviewed all new labs and imaging over the last 24 hours. I personally reviewed the transcranial doppler US report image(s) showing no vasospasm.    Physical Exam  Heart Rate: 82, Blood pressure 131/83, pulse 66, temperature 98.1  F (36.7  C), temperature source Oral, resp. rate 16, height 1.829 m (6'), weight 101.2 kg (223 lb 1.7 oz), SpO2 96 %.  Filed Vitals:    01/23/17 1133 01/28/17 0600 01/29/17 0600   Weight: 104.327 kg (230 lb) 101 kg (222 lb 10.6 oz) 101.2 kg (223 lb 1.7 oz)     Vital Signs with Ranges  Temp:  [98  F (36.7  C)-98.4  F (36.9  C)] 98.1  F (36.7  C)  Pulse:  [66] 66  Heart Rate:  [68-82] 82  Resp:  [16] 16  BP: (115-162)/() 131/83 mmHg  SpO2:  [95 %-100 %] 96 %  I/O's Last 24 hours  I/O last 3 completed shifts:  In: 2662 [P.O.:835; I.V.:1827]  Out: 4425 [Urine:4425]    Constitutional: Alert, awake and no apparent distress   Respiratory: Clear to auscultation bilaterally  Cardiovascular: Regular rate and rhythm  GI: soft, Non-tender and non-distended  Ext: no LE edema bilaterally      Medications  All medications I am responsible for were reviewed.    - MEDICATION INSTRUCTIONS -         polyethylene glycol  17 g Oral Daily     senna-docusate  1 tablet Oral BID     sodium chloride  1 g Oral TID w/meals     cyanocobalamin  1,000 mcg Sublingual Daily     levETIRAcetam  500 mg Oral BID     niMODipine  60 mg Oral Q4H     sodium chloride (PF)  3 mL Intracatheter  Q8H        Data    Recent Labs  Lab 01/30/17  1216 01/30/17  0610 01/30/17  0005  01/29/17  0615  01/28/17  0600  01/26/17  0500 01/24/17  2325 01/24/17  1724 01/24/17  1135 01/24/17  0455   WBC  --  7.9  --   --  9.8  --  10.5  < >  --   --   --   --  11.6*   HGB  --  13.2*  --   --  14.1  --  13.6  < >  --   --   --   --  13.0*   MCV  --  87  --   --  87  --  87  < >  --   --   --   --  90   PLT  --  187  --   --  236  --  227  < >  --   --   --   --  196   * 127* 128*  < > 126*  < > 126*  < > 127*  --   --   --  137   POTASSIUM  --   --   --   --   --   --   --   --  3.6  --   --   --  3.6   CHLORIDE  --   --   --   --   --   --   --   --  95  --   --   --  102   CO2  --   --   --   --   --   --   --   --  25  --   --   --  26   BUN  --   --   --   --   --   --   --   --  10  --   --   --  6*   CR  --   --   --   --   --   --   --   --  0.58*  --   --   --  0.64*   ANIONGAP  --   --   --   --   --   --   --   --  7  --   --   --  9   RADHA  --   --   --   --   --   --   --   --  8.4*  --   --   --  8.0*   GLC  --   --   --   --   --   --   --   --  115*  --   --   --  132*   TROPI  --   --   --   --   --   --   --   --   --  0.084* 0.062* 0.095* 0.095*   < > = values in this interval not displayed.    Recent Results (from the past 24 hour(s))   US Transcranial Doppler Complete Port    Narrative    TRANSCRANIAL DOPPLER ULTRASOUND January 30, 2017 8:50 AM     HISTORY: Vasospasm.    COMPARISON: 1/29/2017.    FINDINGS:  Mean velocities are as follows:    Right M1:   70 cm/sec  Right A1:    40 cm/sec  Right PCA: 27 cm/sec  Right ICA:   53 cm/sec; Lindegaard index: 1.3.    Left M1:   76 cm/sec  Left A1:    48 cm/sec  Left PCA: 66 cm/sec  Left ICA: 46 cm/sec; Lindegaard index: 1.6.      Impression    IMPRESSION: No evidence of vasospasm.    GRANT ALEXANDRA MD

## 2017-01-30 NOTE — PROGRESS NOTES
01/30/17 1500   Signing Clinician's Name / Credentials   Signing clinician's name / credentials Janna Mendenhall DPT   Lovell Balance Scale (GILES HOBSON, YUMI S, KEVIN HERNANDEZ, SHAAN REDMOND: MEASURING BALANCE IN THE ELDERLY: VALIDATION OF AN INSTRUMENT. CAN. J. PUB. HEALTH, JULY/AUGUST SUPPLEMENT 2:S7-11, 1992.)   Sit To Stand 4   Standing Unsupported 4   Sitting Unsupported 4   Stand to Sit 4   Transfers 4   Standing with Eyes Closed 4   Standing Unsupported, Feet Together 4   Reach Forward With Outstretched Arm 3   Retrieve Object From Floor 4   Turning to Look Behind 4   Turn 360 Degrees 2   Placing Alternate Foot on Stool (4-6 inches) 3   Unsupported Tandem Stand (Demonstrate to Subject) 4   One Leg Stand 4   Total Score (A score of 45 or less has been correlated with an increased risk of falls)   Total Score (out of 56) 52     Lovell Balance Scale (BBS) Cutoff Scores: A score of ? 45/56 indicates an increased risk for falls.   Patient Score: 52/56    The BBS is a measure of static and dynamic standing balance that has been validated in community dwelling elderly individuals and individuals who have Parkinson's Disease, MS, and those who are s/p CVA and TBI. The test is administered without an assistive device. Scores from the Lovell are used to determine the probability of falling based on the patient's previous history of falls and their test performance.     Minimal Detectable Change = 6.5   & Minimal Detectable Change (Parkinson's Disease) = 5 according to Timbo & Mattey 2008    Assessment (rationale for performing, application to patient s function & care plan): current hospitalization, decreased balance  Minutes billed as physical performance test: 15 minutes

## 2017-01-31 ENCOUNTER — APPOINTMENT (OUTPATIENT)
Dept: ULTRASOUND IMAGING | Facility: CLINIC | Age: 42
DRG: 021 | End: 2017-01-31
Attending: PSYCHIATRY & NEUROLOGY
Payer: COMMERCIAL

## 2017-01-31 ENCOUNTER — APPOINTMENT (OUTPATIENT)
Dept: PHYSICAL THERAPY | Facility: CLINIC | Age: 42
DRG: 021 | End: 2017-01-31
Payer: COMMERCIAL

## 2017-01-31 ENCOUNTER — APPOINTMENT (OUTPATIENT)
Dept: OCCUPATIONAL THERAPY | Facility: CLINIC | Age: 42
DRG: 021 | End: 2017-01-31
Payer: COMMERCIAL

## 2017-01-31 LAB
ANION GAP SERPL CALCULATED.3IONS-SCNC: 9 MMOL/L (ref 3–14)
BUN SERPL-MCNC: 8 MG/DL (ref 7–30)
CALCIUM SERPL-MCNC: 8.5 MG/DL (ref 8.5–10.1)
CHLORIDE SERPL-SCNC: 90 MMOL/L (ref 94–109)
CO2 SERPL-SCNC: 25 MMOL/L (ref 20–32)
CREAT SERPL-MCNC: 0.61 MG/DL (ref 0.66–1.25)
ERYTHROCYTE [DISTWIDTH] IN BLOOD BY AUTOMATED COUNT: 12.5 % (ref 10–15)
GFR SERPL CREATININE-BSD FRML MDRD: ABNORMAL ML/MIN/1.7M2
GLUCOSE SERPL-MCNC: 97 MG/DL (ref 70–99)
HCT VFR BLD AUTO: 35.8 % (ref 40–53)
HGB BLD-MCNC: 13.4 G/DL (ref 13.3–17.7)
MCH RBC QN AUTO: 32.4 PG (ref 26.5–33)
MCHC RBC AUTO-ENTMCNC: 37.4 G/DL (ref 31.5–36.5)
MCV RBC AUTO: 87 FL (ref 78–100)
OSMOLALITY SERPL: 257 MMOL/KG (ref 275–295)
OSMOLALITY SERPL: 258 MMOL/KG (ref 275–295)
OSMOLALITY SERPL: 258 MMOL/KG (ref 275–295)
OSMOLALITY SERPL: 261 MMOL/KG (ref 275–295)
PLATELET # BLD AUTO: 209 10E9/L (ref 150–450)
POTASSIUM SERPL-SCNC: 3.4 MMOL/L (ref 3.4–5.3)
RBC # BLD AUTO: 4.14 10E12/L (ref 4.4–5.9)
SODIUM SERPL-SCNC: 124 MMOL/L (ref 133–144)
SODIUM SERPL-SCNC: 124 MMOL/L (ref 133–144)
SODIUM SERPL-SCNC: 125 MMOL/L (ref 133–144)
SODIUM SERPL-SCNC: 126 MMOL/L (ref 133–144)
SODIUM SERPL-SCNC: 126 MMOL/L (ref 133–144)
WBC # BLD AUTO: 8.8 10E9/L (ref 4–11)

## 2017-01-31 PROCEDURE — 40000133 ZZH STATISTIC OT WARD VISIT: Performed by: OCCUPATIONAL THERAPY ASSISTANT

## 2017-01-31 PROCEDURE — 85027 COMPLETE CBC AUTOMATED: CPT | Performed by: INTERNAL MEDICINE

## 2017-01-31 PROCEDURE — 25000132 ZZH RX MED GY IP 250 OP 250 PS 637: Performed by: INTERNAL MEDICINE

## 2017-01-31 PROCEDURE — 83930 ASSAY OF BLOOD OSMOLALITY: CPT | Performed by: PSYCHIATRY & NEUROLOGY

## 2017-01-31 PROCEDURE — 83930 ASSAY OF BLOOD OSMOLALITY: CPT | Performed by: INTERNAL MEDICINE

## 2017-01-31 PROCEDURE — 36415 COLL VENOUS BLD VENIPUNCTURE: CPT | Performed by: INTERNAL MEDICINE

## 2017-01-31 PROCEDURE — 36415 COLL VENOUS BLD VENIPUNCTURE: CPT | Performed by: PSYCHIATRY & NEUROLOGY

## 2017-01-31 PROCEDURE — 25000132 ZZH RX MED GY IP 250 OP 250 PS 637: Performed by: PSYCHIATRY & NEUROLOGY

## 2017-01-31 PROCEDURE — 25000132 ZZH RX MED GY IP 250 OP 250 PS 637: Performed by: RADIOLOGY

## 2017-01-31 PROCEDURE — 12000000 ZZH R&B MED SURG/OB

## 2017-01-31 PROCEDURE — 25000132 ZZH RX MED GY IP 250 OP 250 PS 637: Performed by: NURSE PRACTITIONER

## 2017-01-31 PROCEDURE — 40000193 ZZH STATISTIC PT WARD VISIT

## 2017-01-31 PROCEDURE — 97750 PHYSICAL PERFORMANCE TEST: CPT | Mod: GP

## 2017-01-31 PROCEDURE — 97112 NEUROMUSCULAR REEDUCATION: CPT | Mod: GP

## 2017-01-31 PROCEDURE — 84295 ASSAY OF SERUM SODIUM: CPT | Performed by: INTERNAL MEDICINE

## 2017-01-31 PROCEDURE — 97535 SELF CARE MNGMENT TRAINING: CPT | Mod: GO | Performed by: OCCUPATIONAL THERAPY ASSISTANT

## 2017-01-31 PROCEDURE — 25000125 ZZHC RX 250: Performed by: RADIOLOGY

## 2017-01-31 PROCEDURE — 97530 THERAPEUTIC ACTIVITIES: CPT | Mod: GO | Performed by: OCCUPATIONAL THERAPY ASSISTANT

## 2017-01-31 PROCEDURE — 80048 BASIC METABOLIC PNL TOTAL CA: CPT | Performed by: INTERNAL MEDICINE

## 2017-01-31 PROCEDURE — 93886 INTRACRANIAL COMPLETE STUDY: CPT

## 2017-01-31 PROCEDURE — 84295 ASSAY OF SERUM SODIUM: CPT | Performed by: PSYCHIATRY & NEUROLOGY

## 2017-01-31 PROCEDURE — 99232 SBSQ HOSP IP/OBS MODERATE 35: CPT | Performed by: INTERNAL MEDICINE

## 2017-01-31 RX ORDER — SODIUM CHLORIDE 1 G/1
1 TABLET ORAL
Status: DISCONTINUED | OUTPATIENT
Start: 2017-01-31 | End: 2017-02-01 | Stop reason: HOSPADM

## 2017-01-31 RX ORDER — LISINOPRIL 2.5 MG/1
5 TABLET ORAL DAILY
Status: DISCONTINUED | OUTPATIENT
Start: 2017-01-31 | End: 2017-02-01 | Stop reason: HOSPADM

## 2017-01-31 RX ADMIN — NIMODIPINE 60 MG: 30 CAPSULE ORAL at 21:29

## 2017-01-31 RX ADMIN — NIMODIPINE 60 MG: 30 CAPSULE ORAL at 17:29

## 2017-01-31 RX ADMIN — LISINOPRIL 5 MG: 2.5 TABLET ORAL at 11:53

## 2017-01-31 RX ADMIN — SODIUM CHLORIDE TAB 1 GM 1 G: 1 TAB at 21:30

## 2017-01-31 RX ADMIN — NIMODIPINE 60 MG: 30 CAPSULE ORAL at 01:04

## 2017-01-31 RX ADMIN — TRAMADOL HYDROCHLORIDE 50 MG: 50 TABLET, COATED ORAL at 21:29

## 2017-01-31 RX ADMIN — TRAMADOL HYDROCHLORIDE 50 MG: 50 TABLET, COATED ORAL at 15:45

## 2017-01-31 RX ADMIN — ACETAMINOPHEN 650 MG: 325 TABLET, FILM COATED ORAL at 09:13

## 2017-01-31 RX ADMIN — ACETAMINOPHEN 650 MG: 325 TABLET, FILM COATED ORAL at 01:04

## 2017-01-31 RX ADMIN — NIMODIPINE 60 MG: 30 CAPSULE ORAL at 04:55

## 2017-01-31 RX ADMIN — SENNOSIDES AND DOCUSATE SODIUM 1 TABLET: 8.6; 5 TABLET ORAL at 08:26

## 2017-01-31 RX ADMIN — POLYETHYLENE GLYCOL 3350 17 G: 17 POWDER, FOR SOLUTION ORAL at 08:25

## 2017-01-31 RX ADMIN — LEVETIRACETAM 500 MG: 500 TABLET, FILM COATED ORAL at 08:26

## 2017-01-31 RX ADMIN — LEVETIRACETAM 500 MG: 500 TABLET, FILM COATED ORAL at 21:30

## 2017-01-31 RX ADMIN — SODIUM CHLORIDE TAB 1 GM 1 G: 1 TAB at 08:25

## 2017-01-31 RX ADMIN — NIMODIPINE 60 MG: 30 CAPSULE ORAL at 12:54

## 2017-01-31 RX ADMIN — ONDANSETRON HYDROCHLORIDE 4 MG: 2 INJECTION, SOLUTION INTRAMUSCULAR; INTRAVENOUS at 12:53

## 2017-01-31 RX ADMIN — OXYCODONE HYDROCHLORIDE 10 MG: 5 TABLET ORAL at 04:55

## 2017-01-31 RX ADMIN — OXYCODONE HYDROCHLORIDE 10 MG: 5 TABLET ORAL at 09:13

## 2017-01-31 RX ADMIN — Medication 1000 MCG: at 08:26

## 2017-01-31 RX ADMIN — ACETAMINOPHEN 650 MG: 325 TABLET, FILM COATED ORAL at 04:55

## 2017-01-31 RX ADMIN — SODIUM CHLORIDE TAB 1 GM 1 G: 1 TAB at 11:53

## 2017-01-31 RX ADMIN — SODIUM CHLORIDE TAB 1 GM 1 G: 1 TAB at 15:02

## 2017-01-31 RX ADMIN — SODIUM CHLORIDE TAB 1 GM 1 G: 1 TAB at 17:29

## 2017-01-31 RX ADMIN — SENNOSIDES AND DOCUSATE SODIUM 1 TABLET: 8.6; 5 TABLET ORAL at 21:30

## 2017-01-31 RX ADMIN — OXYCODONE HYDROCHLORIDE 10 MG: 5 TABLET ORAL at 01:04

## 2017-01-31 RX ADMIN — BACITRACIN: 500 OINTMENT TOPICAL at 21:31

## 2017-01-31 RX ADMIN — NIMODIPINE 60 MG: 30 CAPSULE ORAL at 08:25

## 2017-01-31 RX ADMIN — TRAMADOL HYDROCHLORIDE 50 MG: 50 TABLET, COATED ORAL at 08:30

## 2017-01-31 ASSESSMENT — VISUAL ACUITY
OU: NORMAL ACUITY

## 2017-01-31 NOTE — PROVIDER NOTIFICATION
Page to MD Lockhart, ? What is goal for NA over NOC and what at are values needed to call for high/low levels in jonna/NOC. Per MD no need to call with levels. MD aware of NA at 1600, please recheck at 1800 as timed.

## 2017-01-31 NOTE — PROVIDER NOTIFICATION
Pt had one time emesis at 1250. Headache was 6 before emesis, 4 after.  Neuro intact. Sodium at 1215 is 124. Will redraw at 1600 and monitor pt. Spoke with Dr. Chang regarding this.

## 2017-01-31 NOTE — PROVIDER NOTIFICATION
Page to hospitalist: FYZACKARY sodium 125    Per MD- call overnight if Na drops below 120 or if pt becomes symptomatic.

## 2017-01-31 NOTE — PROGRESS NOTES
[facility]    Hospitalist Progress Note    Date of Service (when I saw the patient): 01/31/2017    Assessment and Plan  Malvin Au is a 41 year old male who was admitted on 1/23/2017 with loss of consciousness.    1. SAH due to ruptured anterior communicating arterty s/p coil embolization 1/23/17.  - Continue transcranial dopplers for vasospasm  - Nimodipine x 21 days  - Keppra bid for a week for possible seizure at presentation  - PT/OT  - oxycodone for pain control    2. HTN  - Goal SBP < 180  - Start lisinopril 5 mg daily  - Continue nimodipine    3. Hyponatremia  - Salt tabs increased to 5 x daily  - Start 1200 mL fluid restriction  - Check Na every 6 hours    DVT Prophylaxis: Pneumatic Compression Devices  Code Status: Full Code    Disposition: Expected discharge in 3-5 days.    Jaicnto Lockhart  Text Page (7 am to 6 pm)    Interval History  The patient is sitting comfortably in his room.  He denies thirst.    -Data reviewed today: I reviewed all new labs and imaging results over the last 24 hours. I personally reviewed no images or EKG's today.    Physical Exam  Temp: 98  F (36.7  C) Temp src: Oral BP: (!) 148/99 mmHg   Heart Rate: 78 Resp: 16 SpO2: 95 % O2 Device: None (Room air)    Filed Vitals:    01/23/17 1133 01/28/17 0600 01/29/17 0600   Weight: 104.327 kg (230 lb) 101 kg (222 lb 10.6 oz) 101.2 kg (223 lb 1.7 oz)     Vital Signs with Ranges  Temp:  [97.8  F (36.6  C)-98.3  F (36.8  C)] 98  F (36.7  C)  Heart Rate:  [65-82] 78  Resp:  [16] 16  BP: (115-167)/() 148/99 mmHg  SpO2:  [95 %-97 %] 95 %  I/O last 3 completed shifts:  In: 2353 [P.O.:480; I.V.:1873]  Out: 2325 [Urine:2325]    Gen: Well nourished, well developed, alert and oriented x 3, no acute distressed  HEENT: Atraumatic, normocephalic  Lungs: Clear to ausculation without wheezes, rhonchi, or rales  Heart: Regular rate and rhythm, no murmurs, gallops, or rubs  GI: Bowel sound normal, no hepatosplenomegaly or  masses  Lymph: No lymphadenopathy or edema  Skin: No rashes     Medications    - MEDICATION INSTRUCTIONS -         sodium chloride  1 g Oral 5x Daily     lisinopril  5 mg Oral Daily     polyethylene glycol  17 g Oral Daily     senna-docusate  1 tablet Oral BID     cyanocobalamin  1,000 mcg Sublingual Daily     levETIRAcetam  500 mg Oral BID     niMODipine  60 mg Oral Q4H     sodium chloride (PF)  3 mL Intracatheter Q8H       Data    Recent Labs  Lab 01/31/17  0645 01/31/17  0005 01/30/17  1909  01/30/17  0610  01/29/17  0615  01/26/17  0500 01/24/17  2325 01/24/17  1724 01/24/17  1135   WBC 8.8  --   --   --  7.9  --  9.8  < >  --   --   --   --    HGB 13.4  --   --   --  13.2*  --  14.1  < >  --   --   --   --    MCV 87  --   --   --  87  --  87  < >  --   --   --   --      --   --   --  187  --  236  < >  --   --   --   --    * 125* 125*  < > 127*  < > 126*  < > 127*  --   --   --    POTASSIUM 3.4  --   --   --   --   --   --   --  3.6  --   --   --    CHLORIDE 90*  --   --   --   --   --   --   --  95  --   --   --    CO2 25  --   --   --   --   --   --   --  25  --   --   --    BUN 8  --   --   --   --   --   --   --  10  --   --   --    CR 0.61*  --   --   --   --   --   --   --  0.58*  --   --   --    ANIONGAP 9  --   --   --   --   --   --   --  7  --   --   --    RADHA 8.5  --   --   --   --   --   --   --  8.4*  --   --   --    GLC 97  --   --   --   --   --   --   --  115*  --   --   --    TROPI  --   --   --   --   --   --   --   --   --  0.084* 0.062* 0.095*   < > = values in this interval not displayed.    Recent Results (from the past 24 hour(s))   US Transcranial Doppler Complete Port    Narrative    ULTRASOUND TRANSCRANIAL DOPPLER COMPLETE PORTABLE 1/31/2017 8:29 AM     HISTORY: Vasospasm.    TECHNIQUE: Transcranial Doppler ultrasound.    COMPARISON: 1/30/2017.    FINDINGS: Mean velocities in centimeters per second are as follows.    Right MCA: 52; pulsatility index 1.02; Lindegaard index  1.2.    Right A1: 36    Right PCA: 26.    Right ICA: 42.    Left MCA: 64; pulsatility index 1.05; Lindegaard index 1.4.    Left A1: 32.    Left PCA: 32; left ICA: 46.    Basilar artery: 20.      Impression    IMPRESSION: No evidence for vasospasm.    PROSPER ENGLISH MD

## 2017-01-31 NOTE — PROGRESS NOTES
01/31/17 1422   Signing Clinician's Name / Credentials   Signing clinician's name / credentials Rajni Mcgraw PT   Dynamic Gait Index (Edwin and [employer] Meridian, 1995)   Gait Level Surface 3   Change in Gait Speed 3   Gait and Horizontal Head Turns 2   Gait with Vertical Head Turns 3   Gait and Pivot Turns 2   Step Over Obstacle 3   Step Around Obstacles 3   Steps 3   Total Dynamic Gait Index Score  (A score of 19 or less has been correlated to an increased risk of falls in community dwelling older adults, patients with vestibular disorders, and patients with MS.)   Total Score (out of 24) 22

## 2017-01-31 NOTE — PLAN OF CARE
Problem: Goal Outcome Summary  Goal: Goal Outcome Summary  PT: Pt is pleasant and cooperative, agreeable to PT. Pt did report a recent episode of emesis. Pt currently demonstrates independence with bed mobility and transfers. Independent with ambulation but slow pace, wide KIMBERLYN. Completed DGI with score 22/24 indicating low risk for falls. Pt does however continue to move slowly, only ambulating 72' in 25 seconds. Instructed pt in some SLS and balance activities to work on at home to improve speed of balance reactions. Wife present for education. Rec home with wife, balance and gait program.

## 2017-01-31 NOTE — PLAN OF CARE
Problem: Goal Outcome Summary  Goal: Goal Outcome Summary  Outcome: Improving  SAH secondary to ruptured anterior communicating artery aneurysm. VSS except HTN. A&O. Neuros intact except for headache. CMS intact, denies numbness/tingling. Tele NSR. Gluten free diet. Up independently. Voiding. Na draws q6hr, next Na draw at 1615; early d/t pt emesis. C/o moderate headache, controlled with PRN oxycodone, Tylenol, tramadol. D/c possibly tomorrow if sodium level is stabilized. Continue to monitor and follow POC.

## 2017-01-31 NOTE — PLAN OF CARE
Problem: Goal Outcome Summary  Goal: Goal Outcome Summary  OT: Per plan established by the Occupational Therapist, the recommended discharge location is home with assist from family as needed for ADLS/IADLS. Pt completed amb to/from bathroom, toileting, at sink ADLS independent, completed cognitive assessment SLUMS with pt score of 20/30 which indicates moderate cognitive deficits, deficits noted in attention, STM and executive functions.       OT: Following discussion with NICOLAS, new discharge recommendations made for home with increased A from family for IADLs, fidencio transportation, and follow up with OP OT for ongoing cognitive intervention.

## 2017-01-31 NOTE — PLAN OF CARE
Problem: Goal Outcome Summary  Goal: Goal Outcome Summary  Outcome: Improving  A/O, VSS. Tele NSR. Neuros intact ex anterior headache decreased with oxycodone, tylenol and ultram. Up with SBA. Poor/fair appetite on gluten free diet. Voiding per urinal. Na stable at 125.

## 2017-01-31 NOTE — PROGRESS NOTES
[facility]  Ne[facility] [place]  Neurology Daily Note      Admission Date:1/23/2017   Date of service: 01/31/2017   Hospital Day: 9      Assessment and Plan  _______________________________  #. (I60.9) Subarachnoid hemorrhage (H)  (primary encounter diagnosis)  --Arevalo grade III, Mcguire-Salmeron grade II SAH  --Secondary to ruptured anterior communicating artery aneurysm   --no evidence of spasm on TCD thus far, continue daily  --nimodipine for 21 days  #. (G91.9) Hydrocephalus  --Repeat CT head stable mild hydrocephalus  #. (R56.9) Convulsions, unspecified convulsion type (H)  --Seizure on the onset.    --Keppra for a week  #. (I10) Malignant essential hypertension  --Keep BP <180 mm Hg  #. (I60.9) Cerebral aneurysm rupture (H)  --Coiled 5.2 mm AcomA aneurysm 1/23/17  #. (E87.1) Hyponatremia  --Significant Na drop  --Very refractory to management  --stopped 1.5% saline, Na continued to drop  ----increase salt tabs to 5 times daily  #. PT/OT/Speech  --continue evaluations  #. Nutrition  --Per speech therapy evaluation   #. 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 Upon EMS arrival, he was drooling, hypertensive, and remained unresponsive. No obvious trauma or seizure activity. He was incontinent. Upon waking was confused and appeared postictal. He was found to have left leg weakness and code stroke was called. Imaging demonstrated SAH likely from AcomA aneurysm. Aneurysm coiled on 1/23/17.    Continues to have asymptomatic hyponatremia. Headache continues, not as severe. No seizures. No weakness.      Review of Systems  _______________________________  The Review of Systems is negative other than noted in the HPI  Physical  Exam  _______________________________  Vitals: Temp: 97.8  F (36.6  C) Temp src: Oral BP: (!) 149/102 mmHg   Heart Rate: 67 Resp: 16 SpO2: 95 % O2 Device: None (Room air)    Vital Signs with Ranges: Temp:  [97.8  F (36.6  C)-98.3  F (36.8  C)] 97.8  F (36.6  C)  Heart Rate:  [65-82] 67  Resp:  [16] 16  BP: (115-167)/() 149/102 mmHg  SpO2:  [95 %-97 %] 95 %    General Appearance:  No acute distress  Neuro:       Mental Status Exam:   Awake, alert, oriented X3. Speech and language are intact. Mental status is normal       Cranial Nerves:  Pupils 3 mm, reactive. EOMI. Face sensation is normal. Face is symmetric. Tongue and uvula are midline. Other CN are normal           Motor:  5/5 X 4. Tone and bulk are normal            Reflexes:  Normal DTR. Toes downgoing.         Sensory:  Normal to light touch                   Coordination:   Intact finger-to-nose         Gait:  up with assistance   Cardiovascular: Regular rate and rhythm, no m/r/g  Lungs: Clear to auscultation  Abdomen: Soft, not tender, not distended  Extremities: No clubbing, no cyanosis, no edema    Medications  _______________________________    - MEDICATION INSTRUCTIONS -         polyethylene glycol  17 g Oral Daily     senna-docusate  1 tablet Oral BID     sodium chloride  1 g Oral TID w/meals     cyanocobalamin  1,000 mcg Sublingual Daily     levETIRAcetam  500 mg Oral BID     niMODipine  60 mg Oral Q4H     sodium chloride (PF)  3 mL Intracatheter Q8H       Data  _______________________________      Lab Data:   All data was reviewed by me personally  CBC RESULTS:  Recent Labs   Lab Test  01/31/17   0645  01/30/17   0610  01/29/17   0615   WBC  8.8  7.9  9.8   RBC  4.14*  4.09*  4.34*   HGB  13.4  13.2*  14.1   HCT  35.8*  35.5*  37.6*   PLT  209  187  236     Basic Metabolic Panel:  Recent Labs   Lab Test  01/31/17   0645  01/31/17   0005  01/30/17   1909   01/26/17   0500  01/24/17   0455   NA  124*  125*  125*   < >  127*  137   POTASSIUM  3.4    --    --    --   3.6  3.6   CHLORIDE  90*   --    --    --   95  102   CO2  25   --    --    --   25  26   BUN  8   --    --    --   10  6*   CR  0.61*   --    --    --   0.58*  0.64*   GLC  97   --    --    --   115*  132*   RADHA  8.5   --    --    --   8.4*  8.0*    < > = values in this interval not displayed.     Lipid Profile:  Recent Labs   Lab Test  01/23/17   1800   CHOL  129   HDL  43   LDL  68   TRIG  91     Thyroid Panel:  Recent Labs   Lab Test  01/23/17   1800   TSH  0.61      Vitamin B12:   Recent Labs   Lab Test  01/24/17   1135   B12  285  Quantity not sufficient      Vitamin D level:   Recent Labs   Lab Test  01/23/17   1800   VITDT  36     A1C:   Recent Labs   Lab Test  01/23/17   1800   A1C  4.6     Troponin I:   Recent Labs   Lab Test  01/24/17   2325  01/24/17   1724  01/24/17   1135  01/24/17   0455  01/23/17   1800   TROPI  0.084*  0.062*  0.095*  0.095*  0.190*     CRP inflammation:   Recent Labs   Lab Test  01/23/17   1800   CRP  <2.9        Cardiac US:   Left ventricular systolic function is normal. The visual ejection fraction is estimated at 60-65%. A contrast injection (Bubble Study) was performed that was negative for flow across the interatrial septum. Image quality was poor. Valsalva was performed. There is mild to moderate concentric left ventricular hypertrophy. The study was technically difficult. There is no comparison study available.       Imaging:   All imaging studies were reviewed personally  CT head 1/23/17:   1. Extensive subarachnoid hemorrhage in a pattern that suggests aneurysmal rupture.  2. Mild hydrocephalus.  3. No evidence for ischemic infarct.    CTP head 1/23/17:  --Normal CT perfusion of the brain.    Cerebral angiogram:   --Irregular 5.2 x 5 mm anterior communicating artery aneurysm with a broad 3 mm neck and a leftward directed daughter bleb/pseudoaneurysm measuring 2.4 x 2.9 mm.  Technically successful coil embolization with complete angiographic closure of the  daughter bleb/pseudoaneurysm as the presumed ruptured site, and good coil packing of the aneurysm proper, with mild persistent interstitial filling.    CT head 1/24/17:  --Interval endovascular coiling, otherwise no significant change. There is some persistent mild ventriculomegaly which is most likely due to mild hydrocephalus.    TCD 1/29/17:  --No evidence of vasospasm.        Abigail Reyes, FNP-BC

## 2017-01-31 NOTE — PLAN OF CARE
Problem: Goal Outcome Summary  Goal: Goal Outcome Summary  Outcome: Improving  SAH secondary to ruptured anterior communicating artery aneurysm. VSS. A&O. Neuros intact except for headache. CMS intact, denies numbness/tingling. Tele NSR. Gluten free diet. Up independently. Voiding. Na draws q6hr, last Na draw 125. C/o moderate headache, controlled with PRN oxycodone, Tylenol, tramadol. Daily transcranial dopplers. D/c pending at this time. Will continue to monitor.

## 2017-01-31 NOTE — PROGRESS NOTES
Care Transition Initial Assessment - RN    Reason For Consult: care coordination/care conference, discharge planning   Met with: Patient and Family (wife).    DATA   Active Problems:    SAH (subarachnoid hemorrhage) (H)       Cognitive Status: awake, alert and oriented.  Primary Care Clinic Name: [facility]  Primary Care MD Name: Shoaib  Contact information and PCP information verified: Yes - established new PCP.    Lives With: child(amy), dependent, spouse  Living Arrangements: house  Quality Of Family Relationships: supportive, involved, helpful  Description of Support System: Supportive, Involved   Who is your support system?: Wife   Support Assessment: Adequate family and caregiver support, Adequate social supports     Insurance concerns: No Insurance issues identified    ASSESSMENT  Patient currently receives the following services:  none        Identified issues/concerns regarding health management: Wife works out of home. OT to assess with more memory tests.      PLAN  Patient anticipates discharging to home with wife.        Patient anticipates needs for home equipment: No    Plan/Disposition: Home with wife. Pt does not have a PCP, appt made. 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 Wife wondering when pt will be able to drive. Writer instructed her to ask neuro and to follow up at PCP appt for FMLA paperwork. Wife feels pt can walk up and down the stairs with no issue. Wife works outside of home.   Appointments: New PCP appt made for Wed 2/8.         Care  (CTS) will continue to follow as needed.

## 2017-02-01 ENCOUNTER — CARE COORDINATION (OUTPATIENT)
Dept: CARE COORDINATION | Facility: CLINIC | Age: 42
End: 2017-02-01

## 2017-02-01 ENCOUNTER — APPOINTMENT (OUTPATIENT)
Dept: ULTRASOUND IMAGING | Facility: CLINIC | Age: 42
DRG: 021 | End: 2017-02-01
Attending: PSYCHIATRY & NEUROLOGY
Payer: COMMERCIAL

## 2017-02-01 ENCOUNTER — APPOINTMENT (OUTPATIENT)
Dept: OCCUPATIONAL THERAPY | Facility: CLINIC | Age: 42
DRG: 021 | End: 2017-02-01
Payer: COMMERCIAL

## 2017-02-01 VITALS
WEIGHT: 223.11 LBS | TEMPERATURE: 98.1 F | HEART RATE: 66 BPM | RESPIRATION RATE: 16 BRPM | DIASTOLIC BLOOD PRESSURE: 97 MMHG | SYSTOLIC BLOOD PRESSURE: 143 MMHG | BODY MASS INDEX: 30.22 KG/M2 | HEIGHT: 72 IN | OXYGEN SATURATION: 95 %

## 2017-02-01 LAB
ERYTHROCYTE [DISTWIDTH] IN BLOOD BY AUTOMATED COUNT: 12.7 % (ref 10–15)
HCT VFR BLD AUTO: 37.3 % (ref 40–53)
HGB BLD-MCNC: 14 G/DL (ref 13.3–17.7)
MCH RBC QN AUTO: 32.6 PG (ref 26.5–33)
MCHC RBC AUTO-ENTMCNC: 37.5 G/DL (ref 31.5–36.5)
MCV RBC AUTO: 87 FL (ref 78–100)
OSMOLALITY SERPL: 263 MMOL/KG (ref 275–295)
OSMOLALITY SERPL: 266 MMOL/KG (ref 275–295)
OSMOLALITY SERPL: 267 MMOL/KG (ref 275–295)
PLATELET # BLD AUTO: 228 10E9/L (ref 150–450)
RBC # BLD AUTO: 4.3 10E12/L (ref 4.4–5.9)
SODIUM SERPL-SCNC: 124 MMOL/L (ref 133–144)
SODIUM SERPL-SCNC: 126 MMOL/L (ref 133–144)
SODIUM SERPL-SCNC: 127 MMOL/L (ref 133–144)
WBC # BLD AUTO: 9.9 10E9/L (ref 4–11)

## 2017-02-01 PROCEDURE — 99239 HOSP IP/OBS DSCHRG MGMT >30: CPT | Performed by: INTERNAL MEDICINE

## 2017-02-01 PROCEDURE — 25000132 ZZH RX MED GY IP 250 OP 250 PS 637: Performed by: INTERNAL MEDICINE

## 2017-02-01 PROCEDURE — 85027 COMPLETE CBC AUTOMATED: CPT | Performed by: INTERNAL MEDICINE

## 2017-02-01 PROCEDURE — 36415 COLL VENOUS BLD VENIPUNCTURE: CPT | Performed by: INTERNAL MEDICINE

## 2017-02-01 PROCEDURE — 84295 ASSAY OF SERUM SODIUM: CPT | Performed by: INTERNAL MEDICINE

## 2017-02-01 PROCEDURE — 25000132 ZZH RX MED GY IP 250 OP 250 PS 637: Performed by: NURSE PRACTITIONER

## 2017-02-01 PROCEDURE — 40000133 ZZH STATISTIC OT WARD VISIT: Performed by: OCCUPATIONAL THERAPY ASSISTANT

## 2017-02-01 PROCEDURE — 97535 SELF CARE MNGMENT TRAINING: CPT | Mod: GO | Performed by: OCCUPATIONAL THERAPY ASSISTANT

## 2017-02-01 PROCEDURE — 83930 ASSAY OF BLOOD OSMOLALITY: CPT | Performed by: INTERNAL MEDICINE

## 2017-02-01 PROCEDURE — 93886 INTRACRANIAL COMPLETE STUDY: CPT

## 2017-02-01 PROCEDURE — 25000132 ZZH RX MED GY IP 250 OP 250 PS 637: Performed by: PSYCHIATRY & NEUROLOGY

## 2017-02-01 RX ORDER — LISINOPRIL 5 MG/1
5 TABLET ORAL DAILY
Qty: 30 TABLET | Refills: 0 | Status: SHIPPED | OUTPATIENT
Start: 2017-02-01 | End: 2017-02-06

## 2017-02-01 RX ORDER — LEVETIRACETAM 500 MG/1
500 TABLET ORAL DAILY
Qty: 2 TABLET | Refills: 0 | Status: SHIPPED | OUTPATIENT
Start: 2017-02-02 | End: 2017-02-24

## 2017-02-01 RX ORDER — TRAMADOL HYDROCHLORIDE 50 MG/1
50 TABLET ORAL EVERY 6 HOURS PRN
Qty: 40 TABLET | Refills: 0 | Status: SHIPPED | OUTPATIENT
Start: 2017-02-01 | End: 2017-07-28

## 2017-02-01 RX ORDER — SODIUM CHLORIDE 1 G/1
1 TABLET ORAL
Qty: 150 TABLET | Refills: 0 | Status: SHIPPED | OUTPATIENT
Start: 2017-02-01 | End: 2017-02-24

## 2017-02-01 RX ORDER — LEVETIRACETAM 500 MG/1
500 TABLET ORAL DAILY
Status: DISCONTINUED | OUTPATIENT
Start: 2017-02-02 | End: 2017-02-01 | Stop reason: HOSPADM

## 2017-02-01 RX ORDER — NIMODIPINE 30 MG/1
60 CAPSULE, LIQUID FILLED ORAL EVERY 4 HOURS
Qty: 228 CAPSULE | Refills: 0 | Status: SHIPPED | OUTPATIENT
Start: 2017-02-01 | End: 2017-02-24

## 2017-02-01 RX ADMIN — NIMODIPINE 60 MG: 30 CAPSULE ORAL at 08:09

## 2017-02-01 RX ADMIN — Medication 1000 MCG: at 08:08

## 2017-02-01 RX ADMIN — ACETAMINOPHEN 650 MG: 325 TABLET, FILM COATED ORAL at 08:08

## 2017-02-01 RX ADMIN — ACETAMINOPHEN 650 MG: 325 TABLET, FILM COATED ORAL at 12:52

## 2017-02-01 RX ADMIN — TRAMADOL HYDROCHLORIDE 50 MG: 50 TABLET, COATED ORAL at 10:03

## 2017-02-01 RX ADMIN — NIMODIPINE 60 MG: 30 CAPSULE ORAL at 12:10

## 2017-02-01 RX ADMIN — SENNOSIDES AND DOCUSATE SODIUM 1 TABLET: 8.6; 5 TABLET ORAL at 08:08

## 2017-02-01 RX ADMIN — NIMODIPINE 60 MG: 30 CAPSULE ORAL at 04:56

## 2017-02-01 RX ADMIN — SODIUM CHLORIDE TAB 1 GM 1 G: 1 TAB at 10:03

## 2017-02-01 RX ADMIN — TRAMADOL HYDROCHLORIDE 50 MG: 50 TABLET, COATED ORAL at 04:05

## 2017-02-01 RX ADMIN — LEVETIRACETAM 500 MG: 500 TABLET, FILM COATED ORAL at 08:08

## 2017-02-01 RX ADMIN — NIMODIPINE 60 MG: 30 CAPSULE ORAL at 00:34

## 2017-02-01 RX ADMIN — LISINOPRIL 5 MG: 2.5 TABLET ORAL at 08:09

## 2017-02-01 RX ADMIN — SODIUM CHLORIDE TAB 1 GM 1 G: 1 TAB at 07:25

## 2017-02-01 ASSESSMENT — VISUAL ACUITY
OU: NORMAL ACUITY

## 2017-02-01 NOTE — PLAN OF CARE
Problem: Goal Outcome Summary  Goal: Goal Outcome Summary  Outcome: Adequate for Discharge Date Met:  02/01/17  Neuros stable.  Mild headache relieved with Ultram and tylenol.  Up independently in room.  Discharge instructions & medications reviewed and questions answered.

## 2017-02-01 NOTE — PROGRESS NOTES
[facility]    Hospitalist Progress Note    Date of Service (when I saw the patient): 02/01/2017    Assessment and Plan  Malvin Au is a 41 year old male who was admitted on 1/23/2017 with loss of consciousness.    1. SAH due to ruptured anterior communicating arterty s/p coil embolization 1/23/17.  - Continue transcranial dopplers for vasospasm  - Nimodipine x 21 days  - Keppra bid for a week for possible seizure at presentation  - PT/OT  - oxycodone for pain control    2. HTN  - Goal SBP < 180  - Started lisinopril 5 mg daily on 1/31  - Continue nimodipine    3. Hyponatremia  - Salt tabs increased to 5 x daily on 1/31  - Started 1200 mL fluid restriction on 1/31  - Check Na every 6 hours    DVT Prophylaxis: Pneumatic Compression Devices  Code Status: Full Code    Disposition: Expected discharge in 3-5 days.    Jacinto Lockhart  Text Page (7 am to 6 pm)    Interval History  The patient is sitting comfortably in his room.  He denies thirst.    -Data reviewed today: I reviewed all new labs and imaging results over the last 24 hours. I personally reviewed no images or EKG's today.    Physical Exam  Temp: 98.1  F (36.7  C) Temp src: Oral BP: (!) 143/97 mmHg   Heart Rate: 75 Resp: 16 SpO2: 95 % O2 Device: None (Room air)    Filed Vitals:    01/23/17 1133 01/28/17 0600 01/29/17 0600   Weight: 104.327 kg (230 lb) 101 kg (222 lb 10.6 oz) 101.2 kg (223 lb 1.7 oz)     Vital Signs with Ranges  Temp:  [97.2  F (36.2  C)-98.7  F (37.1  C)] 98.1  F (36.7  C)  Heart Rate:  [70-83] 75  Resp:  [16] 16  BP: (140-159)/() 143/97 mmHg  SpO2:  [93 %-100 %] 95 %  I/O last 3 completed shifts:  In: 1390 [P.O.:1390]  Out: 1000 [Urine:1000]    Gen: Well nourished, well developed, alert and oriented x 3, no acute distressed  HEENT: Atraumatic, normocephalic  Lungs: Clear to ausculation without wheezes, rhonchi, or rales  Heart: Regular rate and rhythm, no murmurs, gallops, or rubs  GI: Bowel sound normal, no  hepatosplenomegaly or masses  Lymph: No lymphadenopathy or edema  Skin: No rashes     Medications    - MEDICATION INSTRUCTIONS -         sodium chloride  1 g Oral 5x Daily     lisinopril  5 mg Oral Daily     polyethylene glycol  17 g Oral Daily     senna-docusate  1 tablet Oral BID     cyanocobalamin  1,000 mcg Sublingual Daily     levETIRAcetam  500 mg Oral BID     niMODipine  60 mg Oral Q4H     sodium chloride (PF)  3 mL Intracatheter Q8H       Data    Recent Labs  Lab 02/01/17  0600 02/01/17  0005 01/31/17  1900  01/31/17  0645  01/30/17  0610  01/26/17  0500   WBC 9.9  --   --   --  8.8  --  7.9  < >  --    HGB 14.0  --   --   --  13.4  --  13.2*  < >  --    MCV 87  --   --   --  87  --  87  < >  --      --   --   --  209  --  187  < >  --    * 127* 126*  < > 124*  < > 127*  < > 127*   POTASSIUM  --   --   --   --  3.4  --   --   --  3.6   CHLORIDE  --   --   --   --  90*  --   --   --  95   CO2  --   --   --   --  25  --   --   --  25   BUN  --   --   --   --  8  --   --   --  10   CR  --   --   --   --  0.61*  --   --   --  0.58*   ANIONGAP  --   --   --   --  9  --   --   --  7   RADHA  --   --   --   --  8.5  --   --   --  8.4*   GLC  --   --   --   --  97  --   --   --  115*   < > = values in this interval not displayed.    No results found for this or any previous visit (from the past 24 hour(s)).

## 2017-02-01 NOTE — PLAN OF CARE
Problem: Goal Outcome Summary  Goal: Goal Outcome Summary  Outcome: No Change  Pt a/ox4 with VS, runs HTN but in SBP parameters, note left to address DBP goal. Neuros intact with exception of frontal HA, managed with ultram PRN. CMS with trace BLE edema. On tele in SR. Serial NA trending up, see note regarding notification over NOC. Fluid restriction at 1200 ml, pt rec'd education this jonna and strategies to maintain close restriction. Groin site ecchymotic and SELMA, R AC site CDI. D/c plan pending.

## 2017-02-01 NOTE — PROGRESS NOTES
Dr Chang ordered a sodium level for Fri 2/3 to be called to him.  Pt's PCP appt is next week and this will be his first visit.  Contacted [facility] to see if they will allow lab draw before being seen by PCP as long as it is called to neurologist.  He can do this.  Scheduled Na for Fri at 10am.   confirmed that they have the order in [facility]. Will f/u with pt/wife after OT eval later this morning.   Addendum:  Pt passed safety eval w/ OT but still should not drive for 2 weeks until cleared by neurology and he and wife have been instructed on this.  He will get OP OT and he has transport from wife. He is aware of Fri appt for Na.

## 2017-02-01 NOTE — PLAN OF CARE
Problem: Goal Outcome Summary  Goal: Goal Outcome Summary  Outcome: Improving  SAH secondary to Anterior communicating artery aneurysm rupture. VSS except for DBP in low 100s, SBP elevated but within parameters. Neuros intact except for headache. CMS intact, denies numbness/tingling. Tele NSR. Regular diet, 1200 cc fluid restriction. Pt consumed 200 cc fluid this shift. Na checks q6hr. C/o mild to moderate headache, controlled with Tramadol q6hr PRN. Plan for TCD today. Pt showered this AM. Possible d/c home. Will continue to monitor.

## 2017-02-01 NOTE — DISCHARGE SUMMARY
HOSPITAL COURSE:  Malvin Au is a 41-year-old male who presents after loss of consciousness.  CT scan in the emergency room demonstrated extensive subarachnoid hemorrhage suggesting aneurysmal rupture.  Neurology was consulted.  The patient underwent cerebral angiogram and underwent an endovascular coil embolization of a ruptured anterior communicating artery aneurysm.  The patient was then admitted to the Intensive Care Unit, started on a nicardipine drip.  He was also started on Keppra for possible seizures.  The patient's hospital course was complicated by a persistent hyponatremia.  He was discharged on salt tabs and nicardipine was transitioned to nimodipine for 21 days.  Lisinopril was added to improve blood pressure control and patient was discharged to home.  Daily transcranial Dopplers demonstrated no evidence for vasospasm during hospitalization.      DIAGNOSES AT TIME OF DISCHARGE:   1.  Subarachnoid hemorrhage secondary to ruptured anterior communicating artery aneurysm, status post coil embolization on 1/23/2017.   2.  Hypertensive urgency.   3.  Hyponatremia secondary to cerebral salt wasting.   4.  Hydrocephalus.   5.  Convulsions, unspecified convulsion type.      MEDICATIONS:   1.  Tramadol 50 mg every 6 hours as needed.   2.  Lisinopril 5 mg daily.   3.  Vitamin B12 1000 mcg daily.   4.  Sodium chloride 1 gram 5 times daily.   5.  Nimodipine 60 mg every 4 hours for 19 days.   6.  Keppra 500 mg daily for 2 days.   7.  Multivitamin daily.   8.  Cholecalciferol 1 tab daily.      PHYSICAL EXAMINATION AT TIME OF DISCHARGE:   GENERAL:  Well-nourished, well-developed male sitting comfortably in bed.   VITAL SIGNS:  Temperature 98.1, blood pressure 143/97, pulse 75, respiratory rate 16, satting 95% on room air.   HEENT:  Head is atraumatic, normocephalic.   NECK:  Supple without any lymphadenopathy.   LUNGS:  Clear to auscultation bilaterally.   HEART:  Regular rate and rhythm without murmurs,  gallops, rubs.   ABDOMEN:  Bowel sounds positive.  Soft, nontender, nondistended, no hepatosplenomegaly.   EXTREMITIES:  Without clubbing, cyanosis or edema.      DISPOSITION:  The patient will be discharged to home.  Sodium to be drawn on  with results to Dr. Chang.  Follow up with primary care physician next week with a BMP.  Follow up with Dr. Chang in 2 weeks.  No driving until seen by Neurology.      TOTAL TIME SPENT:  Greater than 30 minutes discharge planning were spent with this patient.         STEFANI FORTUNE MD             D: 2017 13:33   T: 2017 13:52   MT: GABRIEL      Name:     CORAZON ALVAREZ   MRN:      [id number]        Account:        [id number]   :      1975           Admit Date:                                       Discharge Date: 2017      Document: [id number]

## 2017-02-01 NOTE — PLAN OF CARE
Problem: Goal Outcome Summary  Goal: Goal Outcome Summary  PT: Attempted AM PT session; pt just going down for US.      Pt discharged home today.    PT goals partially met.

## 2017-02-01 NOTE — PLAN OF CARE
Problem: Goal Outcome Summary  Goal: Goal Outcome Summary  OT: Per plan established by the Occupational Therapist, the recommended discharge location is home with increased A from family for IADLs, fidencio transportation, and follow up with OP OT for ongoing cognitive intervention.   Pt completed health and safety questions to assess for safety when discharging home. Pt was able to answer 12/12 questions with 100% accuracy. pt declined FB dressing stating he is able to complete independently, anticipate pt will be independent with completing dressing, pt family is available to assist as needed    Pt OT GOALS PARTIALLY MET, pt declined FB dressing family to assist as needed.  See discharge summary

## 2017-02-01 NOTE — PROGRESS NOTES
[facility]  [facility] [place]  Neurology Daily Note      Admission Date:1/23/2017   Date of service: 02/01/2017   Hospital Day: 10      Assessment and Plan  _______________________________  #. (I60.9) Subarachnoid hemorrhage (H)  (primary encounter diagnosis)  --Arevalo grade III, Mcguire-Salmeron grade II SAH  --Secondary to ruptured anterior communicating artery aneurysm   --no evidence of spasm on TCD thus far, continue daily  --nimodipine for 21 days  #. (G91.9) Hydrocephalus  --Repeat CT head stable mild hydrocephalus  #. (R56.9) Convulsions, unspecified convulsion type (H)  --Seizure on the onset, related to high ICP.    --Keppra 500 mg for 2 more days, then stop  --Would not need to have 3 months driving restriction  #. (I10) Malignant essential hypertension  --Keep BP <180 mm Hg  #. (I60.9) Cerebral aneurysm rupture (H)  --Coiled 5.2 mm AcomA aneurysm 1/23/17  #. (E87.1) Hyponatremia  --Significant Na drop  --Very refractory to management  ----Continue salt tabs to 5 times daily  --Fluid restriction is contraindicated due to SAH  ----Check sodium outpatient on Friday and send results to my clinic  #. PT/OT/Speech  --continue evaluations  --Can be d/c home  --Outpatient OT  #. Nutrition  --Per speech therapy evaluation   #. DVT Prophylaxis  --Mechanical only due to SAH    Code Status: Full Code    Disposition: Expected discharge - today  Follow up with my clinic in 2 weeks.     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 Upon EMS arrival, he was drooling, hypertensive, and remained unresponsive. No obvious trauma or seizure activity. He was incontinent. Upon waking was confused and appeared postictal. He was found to have left leg weakness and code stroke was called. Imaging demonstrated SAH likely from AcomA aneurysm. Aneurysm coiled on 1/23/17.    Continues to have  asymptomatic hyponatremia. Headache has improved. No seizures. No weakness. Wants to go home     Review of Systems  _______________________________  The Review of Systems is negative other than noted in the HPI  Physical Exam  _______________________________  Vitals: Temp: 98.1  F (36.7  C) Temp src: Oral BP: (!) 143/97 mmHg   Heart Rate: 75 Resp: 16 SpO2: 95 % O2 Device: None (Room air)    Vital Signs with Ranges: Temp:  [97.2  F (36.2  C)-98.7  F (37.1  C)] 98.1  F (36.7  C)  Heart Rate:  [70-83] 75  Resp:  [16] 16  BP: (140-159)/() 143/97 mmHg  SpO2:  [93 %-100 %] 95 %    General Appearance:  No acute distress  Neuro:       Mental Status Exam:   Awake, alert, oriented X3. Speech and language are intact. Mental status is normal       Cranial Nerves:  Pupils 3 mm, reactive. EOMI. Face sensation is normal. Face is symmetric. Tongue and uvula are midline. Other CN are normal           Motor:  5/5 X 4. Tone and bulk are normal            Reflexes:  Normal DTR. Toes downgoing.         Sensory:  Normal to light touch                   Coordination:   Intact finger-to-nose         Gait:  up with assistance   Cardiovascular: Regular rate and rhythm, no m/r/g  Lungs: Clear to auscultation  Abdomen: Soft, not tender, not distended  Extremities: No clubbing, no cyanosis, no edema    Medications  _______________________________    - MEDICATION INSTRUCTIONS -         sodium chloride  1 g Oral 5x Daily     lisinopril  5 mg Oral Daily     polyethylene glycol  17 g Oral Daily     senna-docusate  1 tablet Oral BID     cyanocobalamin  1,000 mcg Sublingual Daily     levETIRAcetam  500 mg Oral BID     niMODipine  60 mg Oral Q4H     sodium chloride (PF)  3 mL Intracatheter Q8H       Data  _______________________________      Lab Data:   All data was reviewed by me personally  CBC RESULTS:  Recent Labs   Lab Test  02/01/17   0600  01/31/17   0645  01/30/17   0610   WBC  9.9  8.8  7.9   RBC  4.30*  4.14*  4.09*   HGB  14.0  13.4   13.2*   HCT  37.3*  35.8*  35.5*   PLT  228  209  187     Basic Metabolic Panel:  Recent Labs   Lab Test  02/01/17   0600  02/01/17   0005  01/31/17   1900   01/31/17   0645   01/26/17   0500  01/24/17   0455   NA  126*  127*  126*   < >  124*   < >  127*  137   POTASSIUM   --    --    --    --   3.4   --   3.6  3.6   CHLORIDE   --    --    --    --   90*   --   95  102   CO2   --    --    --    --   25   --   25  26   BUN   --    --    --    --   8   --   10  6*   CR   --    --    --    --   0.61*   --   0.58*  0.64*   GLC   --    --    --    --   97   --   115*  132*   RADHA   --    --    --    --   8.5   --   8.4*  8.0*    < > = values in this interval not displayed.     Lipid Profile:  Recent Labs   Lab Test  01/23/17   1800   CHOL  129   HDL  43   LDL  68   TRIG  91     Thyroid Panel:  Recent Labs   Lab Test  01/23/17   1800   TSH  0.61      Vitamin B12:   Recent Labs   Lab Test  01/24/17   1135   B12  285  Quantity not sufficient      Vitamin D level:   Recent Labs   Lab Test  01/23/17   1800   VITDT  36     A1C:   Recent Labs   Lab Test  01/23/17   1800   A1C  4.6     Troponin I:   Recent Labs   Lab Test  01/24/17   2325  01/24/17   1724  01/24/17   1135  01/24/17   0455  01/23/17   1800   TROPI  0.084*  0.062*  0.095*  0.095*  0.190*     CRP inflammation:   Recent Labs   Lab Test  01/23/17   1800   CRP  <2.9        Cardiac US:   Left ventricular systolic function is normal. The visual ejection fraction is estimated at 60-65%. A contrast injection (Bubble Study) was performed that was negative for flow across the interatrial septum. Image quality was poor. Valsalva was performed. There is mild to moderate concentric left ventricular hypertrophy. The study was technically difficult. There is no comparison study available.       Imaging:   All imaging studies were reviewed personally  CT head 1/23/17:   1. Extensive subarachnoid hemorrhage in a pattern that suggests aneurysmal rupture.  2. Mild  hydrocephalus.  3. No evidence for ischemic infarct.    CTP head 1/23/17:  --Normal CT perfusion of the brain.    Cerebral angiogram:   --Irregular 5.2 x 5 mm anterior communicating artery aneurysm with a broad 3 mm neck and a leftward directed daughter bleb/pseudoaneurysm measuring 2.4 x 2.9 mm.  Technically successful coil embolization with complete angiographic closure of the daughter bleb/pseudoaneurysm as the presumed ruptured site, and good coil packing of the aneurysm proper, with mild persistent interstitial filling.    CT head 1/24/17:  --Interval endovascular coiling, otherwise no significant change. There is some persistent mild ventriculomegaly which is most likely due to mild hydrocephalus.    TCD 1/31/17:  --No evidence of vasospasm.

## 2017-02-02 ENCOUNTER — CARE COORDINATION (OUTPATIENT)
Dept: CARE COORDINATION | Facility: CLINIC | Age: 42
End: 2017-02-02

## 2017-02-02 NOTE — PROGRESS NOTES
Clinic Care Coordination Contact  [facility]/Voicemail    Referral Source: CTS  Clinical Data: Care Coordinator Outreach  Outreach attempted x 1.  Left message on voicemail with call back information and requested return call.  Plan:Care Coordinator will try to reach patient again in 1-2 business days.  Dung LIVE,RN- BC  Clinic Care Coordinator  [facility]  Phone: [phone]

## 2017-02-03 DIAGNOSIS — E87.1 HYPONATREMIA: ICD-10-CM

## 2017-02-03 LAB — SODIUM SERPL-SCNC: 129 MMOL/L (ref 133–144)

## 2017-02-03 PROCEDURE — 84295 ASSAY OF SERUM SODIUM: CPT | Performed by: PSYCHIATRY & NEUROLOGY

## 2017-02-03 PROCEDURE — 36415 COLL VENOUS BLD VENIPUNCTURE: CPT | Performed by: PSYCHIATRY & NEUROLOGY

## 2017-02-06 ENCOUNTER — OFFICE VISIT (OUTPATIENT)
Dept: FAMILY MEDICINE | Facility: CLINIC | Age: 42
End: 2017-02-06
Payer: COMMERCIAL

## 2017-02-06 ENCOUNTER — CARE COORDINATION (OUTPATIENT)
Dept: CARE COORDINATION | Facility: CLINIC | Age: 42
End: 2017-02-06

## 2017-02-06 VITALS
HEART RATE: 70 BPM | SYSTOLIC BLOOD PRESSURE: 127 MMHG | TEMPERATURE: 97.8 F | WEIGHT: 218.4 LBS | DIASTOLIC BLOOD PRESSURE: 90 MMHG | OXYGEN SATURATION: 99 % | BODY MASS INDEX: 29.61 KG/M2

## 2017-02-06 DIAGNOSIS — I10 ESSENTIAL HYPERTENSION: Primary | ICD-10-CM

## 2017-02-06 DIAGNOSIS — E87.1 HYPONATREMIA: ICD-10-CM

## 2017-02-06 DIAGNOSIS — I60.9 SAH (SUBARACHNOID HEMORRHAGE) (H): ICD-10-CM

## 2017-02-06 LAB
CREAT UR-MCNC: 133 MG/DL
MICROALBUMIN UR-MCNC: 6 MG/L
MICROALBUMIN/CREAT UR: 4.28 MG/G CR (ref 0–17)
SODIUM SERPL-SCNC: 132 MMOL/L (ref 133–144)

## 2017-02-06 PROCEDURE — 84295 ASSAY OF SERUM SODIUM: CPT | Performed by: INTERNAL MEDICINE

## 2017-02-06 PROCEDURE — 82043 UR ALBUMIN QUANTITATIVE: CPT | Performed by: INTERNAL MEDICINE

## 2017-02-06 PROCEDURE — 36415 COLL VENOUS BLD VENIPUNCTURE: CPT | Performed by: INTERNAL MEDICINE

## 2017-02-06 PROCEDURE — 99214 OFFICE O/P EST MOD 30 MIN: CPT | Performed by: INTERNAL MEDICINE

## 2017-02-06 RX ORDER — LISINOPRIL 5 MG/1
5 TABLET ORAL DAILY
Qty: 180 TABLET | Refills: 1 | Status: SHIPPED | OUTPATIENT
Start: 2017-02-06 | End: 2017-02-24

## 2017-02-06 NOTE — PROGRESS NOTES
Clinic Care Coordination Contact  OUTREACH    Referral Information:  Referral Source: CTS  Reason for Contact: Hospital F/U     [place] Utilization:   ED Visits in last year: 1  Hospital visits in last year: 1  Last PCP appointment:  (none with this PCP)  Multiple Providers or Specialists: Neurology    Clinical Concerns:  Current Medical Concerns: Discharged from [facility] after loss of consciousness R/T subarachnoid bleed from ruptured aneurysm.  Discharged home with OP therapies. Patient healthy until this episode. He continues to improve but is still having headaches (was having prior to hospital DC). Has F/U with new PCP-Dr Cramer 2/13. Discussed making F/U with Dr Chang at the [facility] [phone].  Current Behavioral Concerns: None   Education Provided to patient: Importance of F/U. Introduction to CC role   Clinical Pathway Name: None    Medication Management:  Has many new medications. Is taking as prescribed. Is aware that he needs to take Keppra on a regular schedule and not stop abruptly.    Functional Status:  Mobility Status: Independent  Equipment Currently Used at Home: none  Transportation: Wife transporting at this time. 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 He has others filling in for him right now.     Psychosocial:  Current living arrangement: I live in a private home with family  Financial/Insurance: No concerns at this time.     Resources and Interventions:  Current Resources: Other (Comments) (outpatient PT/OT);    Barriers: Illness is new to patient. Has been healthy prior to this  Strengths: he is following instructions and adjusting his life to deal with new health issues.  Patient/Caregiver understanding: Describes this all as a shock. Didn't know how he could miss work to be in the hospital for a week and now he is still at home with slight headaches. He has a PCP appointment scheduled and will make F/U with  neurology. Didn't know if he needed to or they would call him-he will call.  Upcoming appointment: 02/13/17     Plan: No CC needs identified  at this time. Will close to active CC but remain available should further needs arise.    Dung ORTEGAN,RN- BC  Clinic Care Coordinator  [facility]  Phone: [phone]

## 2017-02-06 NOTE — PROGRESS NOTES
SUBJECTIVE:                                                    Malvin Au is a 41 year old male who presents to clinic today for a follow-up after hospital discharge. He was diagnosed to have malignant hypertension and subarachnoid hemorrhage. 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 He was noted to be drooling, incontinent of urine, unresponsive and hypertensive (systolic blood pressure more than 200 and diastolic blood pressure more than 150). The patient was brought to [facility]. CT of head shows extensive subarachnoid hemorrhage, suggestive of aneurysmal rupture. The patient was started on Nicardipine drip, and underwent emergent endovascular coil embolization  for ruptured anterior communicating artery aneurysm. Subsequent transcranial doppler ultrasound, postoperatively, did not show any vasospasm. Repeat CT scans of the head shows decreasing subarachnoid hemorrhage and normal brain perfusion. No complications such as acute kidney injury, nosocomial pneumonia, myocardial ischemia (elevated troponin I attributed to subarachnoid hemorrhage) nor seizures. The patient was also hyponatremic (expected due to possibly hypothalamic injury from subarachnoid hemorrhage) but no worsening menta status changes since hospital discharge. The patient was also started on Levetiracetam. He is currently asymptomatic, without any complaints of dizziness, blurred vision, nausea, vomiting or nape pain (which was his predominant symptoms prior to hospitalization).          Hospital Follow-up Visit:    Hospital/Nursing Home/IP Rehab Facility: [facility]  Date of Admission: 1/23/17  Date of Discharge: 2/1/17  Reason(s) for Admission: Brain aneurism            Problems taking medications regularly:  None       Medication changes since discharge: None       Problems adhering to non-medication therapy:  None    Summary of  hospitalization:  [facility] discharge summary reviewed  Diagnostic Tests/Treatments reviewed.  Follow up needed: Yes  Other Healthcare Providers Involved in Patient s Care:         None  Update since discharge: improved.     Post Discharge Medication Reconciliation: discharge medications reconciled and changed, per note/orders (see AVS).  Plan of care communicated with patient and family     Coding guidelines for this visit:  Type of Medical   Decision Making Face-to-Face Visit       within 7 Days of discharge Face-to-Face Visit        within 14 days of discharge   Moderate Complexity [id number]   High Complexity [id number]                Problem list and histories reviewed & adjusted, as indicated.  Additional history: as documented    Patient Active Problem List   Diagnosis     SAH (subarachnoid hemorrhage) (H)     Essential hypertension     CARDIOVASCULAR SCREENING; LDL GOAL LESS THAN 130     Past Surgical History   Procedure Laterality Date     Anesthesia out of or mri N/A 1/23/2017     Procedure: ANESTHESIA OUT OF OR MRI;  Surgeon: GENERIC ANESTHESIA PROVIDER;  Location:  OR     Eye surgery       lasik     Vascular surgery       angiogram       Social History   Substance Use Topics     Smoking status: Never Smoker      Smokeless tobacco: Not on file     Alcohol Use: Yes      Comment: a couple per day     History reviewed. No pertinent family history.      No Known Allergies  Recent Labs   Lab Test  01/31/17   0645  01/26/17   0500   01/23/17   1800   A1C   --    --    --   4.6   LDL   --    --    --   68   HDL   --    --    --   43   TRIG   --    --    --   91   CR  0.61*  0.58*   < >   --    GFRESTIMATED  >90  Non  GFR Calc    >90  Non  GFR Calc     < >   --    GFRESTBLACK  >90   GFR Calc    >90   GFR Calc     < >   --    POTASSIUM  3.4  3.6   < >   --    TSH   --    --    --   0.61    < > = values in this interval not displayed.       BP Readings from Last 3 Encounters:   02/06/17 127/90   02/01/17 143/97    Wt Readings from Last 3 Encounters:   02/06/17 218 lb 6.4 oz (99.066 kg)   01/29/17 223 lb 1.7 oz (101.2 kg)                  Labs reviewed in EPIC  Problem list, Medication list, Allergies, and Medical/Social/Surgical histories reviewed in [facility] and updated as appropriate.    ROS:  C: NEGATIVE for fever, chills, change in weight  I: NEGATIVE for worrisome rashes, moles or lesions  E: NEGATIVE for vision changes or irritation  E/M: NEGATIVE for ear, mouth and throat problems  R: NEGATIVE for significant cough or SOB  CV: NEGATIVE for chest pain, palpitations or peripheral edema  GI: NEGATIVE for nausea, abdominal pain, heartburn, or change in bowel habits  : NEGATIVE for frequency, dysuria, or hematuria  M: NEGATIVE for significant arthralgias or myalgia  N: NEGATIVE for weakness, dizziness or paresthesias. LDL last 1/23/17 is 68.  E: NEGATIVE for temperature intolerance, skin/hair changes. A1 last 1/23/17 is 4.6%.  H: NEGATIVE for bleeding problems  P: NEGATIVE for changes in mood or affect    OBJECTIVE:                                                    /90 mmHg  Pulse 70  Temp(Src) 97.8  F (36.6  C) (Oral)  Wt 218 lb 6.4 oz (99.066 kg)  SpO2 99%  Body mass index is 29.61 kg/(m^2).  GENERAL: healthy, alert and no distress  EYES: Eyes grossly normal to inspection  RESP: lungs clear to auscultation - no rales, rhonchi or wheezes  CV: regular rate and rhythm, normal S1 S2, no S3 or S4, no murmur, click or rub, no peripheral edema and peripheral pulses strong  ABDOMEN: soft, nontender, no hepatosplenomegaly, no masses and bowel sounds normal  MS: no gross musculoskeletal defects noted, no edema  SKIN: no suspicious lesions or rashes  NEURO: Normal strength and tone, mentation intact and speech normal  PSYCH: mentation appears normal, affect normal/bright    Diagnostic Test Results:  Results for orders placed or performed in visit  on 02/06/17   Sodium   Result Value Ref Range    Sodium 132 (L) 133 - 144 mmol/L   Albumin Random Urine Quantitative   Result Value Ref Range    Creatinine Urine 133 mg/dL    Albumin Urine mg/L 6 mg/L    Albumin Urine mg/g Cr 4.28 0 - 17 mg/g Cr        ASSESSMENT/PLAN:                                                            (I10) Essential hypertension  (primary encounter diagnosis)  Comment: Goal SBP < 120 due to recent subarachnoid hemorrhage.  Plan: lisinopril (PRINIVIL/ZESTRIL) 5 MG tablet once a day, but increase to 10 mg/day if systolic blood pressure is consistently more than 140. Consider adding calcium channel blockers if goal SBP not achieved. For now, avoid thiazide diuretics due to hyponatremia.        Sodium, Albumin Random Urine Quantitative            (I60.9) SAH (subarachnoid hemorrhage) (H)  Comment: No episodes of seizures nor persistent headaches since hospital discharge.  Plan: Continue Nimodipine per Neurosurgery service. Continue Levetiracetam.    (E87.1) Hyponatremia  Comment: Improving. Secondary to condition #2.  Plan: Repeat sodium level during next clinic visit.    FUTURE APPOINTMENTS:       - Follow-up visit as needed.    Santi Wright MD  [facility]

## 2017-02-06 NOTE — MR AVS SNAPSHOT
After Visit Summary   2/6/2017    Malvin Au    MRN: [id number]           Patient Information     Date Of Birth          1975        Visit Information        Provider Department      2/6/2017 7:40 AM Santi Wright MD [facility]        Today's Diagnoses     Essential hypertension    -  1        Follow-ups after your visit        Your next 10 appointments already scheduled     Feb 13, 2017  4:40 PM   Office Visit with [facility] Cramer MD   [facility]    [address] [place] [id number] [phone]           Bring a current list of meds and any records pertaining to this visit.  For Physicals, please bring immunization records and any forms needing to be filled out.  Please arrive 10 minutes early to complete paperwork.              Who to contact     If you have questions or need follow up information about today's clinic visit or your schedule please contact [facility] directly at [phone].  Normal or non-critical lab and imaging results will be communicated to you by KitCheckhart, letter or phone within 4 business days after the clinic has received the results. If you do not hear from us within 7 days, please contact the clinic through Textbrokert or phone. If you have a critical or abnormal lab result, we will notify you by phone as soon as possible.  Submit refill requests through SonoPlot or call your pharmacy and they will forward the refill request to us. Please allow 3 business days for your refill to be completed.          Additional Information About Your Visit        KitCheckhart Information     SonoPlot gives you secure access to your electronic health record. If you see a primary care provider, you can also send messages to your care team and make appointments. If you have questions, please call your primary care clinic.  If you do not have a primary care provider, please  call [phone] and they will assist you.        Care EveryWhere ID     This is your Care EveryWhere ID. This could be used by other organizations to access your [facility] medical records  [id number]        Your Vitals Were     Pulse Temperature Pulse Oximetry             70 97.8  F (36.6  C) (Oral) 99%          Blood Pressure from Last 3 Encounters:   02/06/17 127/90   02/01/17 143/97    Weight from Last 3 Encounters:   02/06/17 99.066 kg (218 lb 6.4 oz)   01/29/17 101.2 kg (223 lb 1.7 oz)              We Performed the Following     Albumin Random Urine Quantitative     Sodium          Today's Medication Changes          These changes are accurate as of: 2/6/17  8:27 AM.  If you have any questions, ask your nurse or doctor.               These medicines have changed or have updated prescriptions.        Dose/Directions    lisinopril 5 MG tablet   Commonly known as:  PRINIVIL/ZESTRIL   This may have changed:  additional instructions   Used for:  Essential hypertension   Changed by:  Santi Wright MD        Dose:  5 mg   Take 1 tablet (5 mg) by mouth daily May take additional 1 tablet if SBP > 140. Ideal SBP goal is less than 120.   Quantity:  180 tablet   Refills:  1            Where to get your medicines      These medications were sent to [id number] - [place] - [address]  [address] [place] [address]    Hours:  test fax successful 4/5/04  Phone:  [phone]    - lisinopril 5 MG tablet             Primary Care Provider Office Phone # Fax #    Mariya Cramer [phone]       [address] [address]        Thank you!     Thank you for choosing [facility]  for your care. Our goal is always to provide you with excellent care. Hearing back from our patients is one way we can continue to improve our services. Please take a few minutes to complete the written survey that  you may receive in the mail after your visit with us. Thank you!             Your Updated Medication List - Protect others around you: Learn how to safely use, store and throw away your medicines at www.disposemymeds.org.          This list is accurate as of: 2/6/17  8:27 AM.  Always use your most recent med list.                   Brand Name Dispense Instructions for use    cyanocobalamin 500 MCG Subl sublingual tablet     150 tablet    Place 2 tablets (1,000 mcg) under the tongue daily       levETIRAcetam 500 MG tablet    KEPPRA    2 tablet    Take 1 tablet (500 mg) by mouth daily       lisinopril 5 MG tablet    PRINIVIL/ZESTRIL    180 tablet    Take 1 tablet (5 mg) by mouth daily May take additional 1 tablet if SBP > 140. Ideal SBP goal is less than 120.       multivitamin, therapeutic with minerals Tabs tablet      Take 1 tablet by mouth daily       niMODipine 30 MG capsule    NIMOTOP    228 capsule    Take 2 capsules (60 mg) by mouth every 4 hours for 19 days       sodium chloride 1 GM tablet     150 tablet    Take 1 tablet (1 g) by mouth 5 times daily       traMADol 50 MG tablet    ULTRAM    40 tablet    Take 1 tablet (50 mg) by mouth every 6 hours as needed       VITAMIN D (CHOLECALCIFEROL) PO      Take by mouth daily OTC

## 2017-02-06 NOTE — NURSING NOTE
Chief Complaint   Patient presents with     Hospital F/U     Brain Aneuresym       Initial /90 mmHg  Pulse 70  Temp(Src) 97.8  F (36.6  C) (Oral)  Wt 99.066 kg (218 lb 6.4 oz)  SpO2 99% Estimated body mass index is 29.61 kg/(m^2) as calculated from the following:    Height as of 1/23/17: 1.829 m (6').    Weight as of this encounter: 99.066 kg (218 lb 6.4 oz).  Medication Reconciliation: complete   Tracey Dueñas CMA  February 6, 2017 7:52 AM

## 2017-02-07 PROBLEM — I10 ESSENTIAL HYPERTENSION: Status: ACTIVE | Noted: 2017-02-07

## 2017-02-07 PROBLEM — E66.3 OVERWEIGHT (BMI 25.0-29.9): Status: ACTIVE | Noted: 2017-02-07

## 2017-02-07 PROBLEM — Z13.6 CARDIOVASCULAR SCREENING; LDL GOAL LESS THAN 130: Status: ACTIVE | Noted: 2017-02-07

## 2017-02-16 ENCOUNTER — DOCUMENTATION ONLY (OUTPATIENT)
Dept: PSYCHOLOGY | Facility: CLINIC | Age: 42
End: 2017-02-16

## 2017-02-16 NOTE — PROGRESS NOTES
Received referral  fax from [facility]. Called patient to schedule consultation and patient did not schedule appt at this time. Patient stated he will call back to schedule at another time.

## 2017-02-17 DIAGNOSIS — I60.7 CEREBRAL ANEURYSM RUPTURE (H): ICD-10-CM

## 2017-02-17 DIAGNOSIS — I60.9 SUBARACHNOID HEMORRHAGE (H): ICD-10-CM

## 2017-02-17 DIAGNOSIS — G40.109 SPECIAL SENSORY ATTACKS (H): Primary | ICD-10-CM

## 2017-02-17 DIAGNOSIS — I10 ESSENTIAL HYPERTENSION, BENIGN: ICD-10-CM

## 2017-02-17 DIAGNOSIS — E87.1 HYPONATREMIA: ICD-10-CM

## 2017-02-17 PROCEDURE — 36415 COLL VENOUS BLD VENIPUNCTURE: CPT | Performed by: NURSE PRACTITIONER

## 2017-02-17 PROCEDURE — 84295 ASSAY OF SERUM SODIUM: CPT | Performed by: NURSE PRACTITIONER

## 2017-02-20 LAB — SODIUM SERPL-SCNC: 142 MMOL/L (ref 133–144)

## 2017-02-24 ENCOUNTER — TELEPHONE (OUTPATIENT)
Dept: FAMILY MEDICINE | Facility: CLINIC | Age: 42
End: 2017-02-24

## 2017-02-24 ENCOUNTER — RADIANT APPOINTMENT (OUTPATIENT)
Dept: CT IMAGING | Facility: CLINIC | Age: 42
End: 2017-02-24
Attending: INTERNAL MEDICINE
Payer: COMMERCIAL

## 2017-02-24 ENCOUNTER — OFFICE VISIT (OUTPATIENT)
Dept: FAMILY MEDICINE | Facility: CLINIC | Age: 42
End: 2017-02-24
Payer: COMMERCIAL

## 2017-02-24 VITALS
SYSTOLIC BLOOD PRESSURE: 122 MMHG | OXYGEN SATURATION: 96 % | DIASTOLIC BLOOD PRESSURE: 96 MMHG | HEIGHT: 72 IN | TEMPERATURE: 97.9 F | WEIGHT: 219 LBS | BODY MASS INDEX: 29.66 KG/M2 | HEART RATE: 105 BPM

## 2017-02-24 DIAGNOSIS — R07.9 RIGHT-SIDED CHEST PAIN: ICD-10-CM

## 2017-02-24 DIAGNOSIS — I10 UNCONTROLLED HYPERTENSION, STAGE 1: Primary | ICD-10-CM

## 2017-02-24 DIAGNOSIS — R51.9 FRONTAL HEADACHE: ICD-10-CM

## 2017-02-24 DIAGNOSIS — I10 HYPERTENSION GOAL BP (BLOOD PRESSURE) < 130/80: Primary | ICD-10-CM

## 2017-02-24 DIAGNOSIS — J98.11 ATELECTASIS OF RIGHT LUNG: ICD-10-CM

## 2017-02-24 DIAGNOSIS — I45.10 RIGHT BUNDLE BRANCH BLOCK: ICD-10-CM

## 2017-02-24 DIAGNOSIS — I60.9 SAH (SUBARACHNOID HEMORRHAGE) (H): ICD-10-CM

## 2017-02-24 DIAGNOSIS — R91.8 PULMONARY NODULES: ICD-10-CM

## 2017-02-24 LAB — RADIOLOGIST FLAGS: NORMAL

## 2017-02-24 PROCEDURE — 99214 OFFICE O/P EST MOD 30 MIN: CPT | Performed by: INTERNAL MEDICINE

## 2017-02-24 PROCEDURE — 71260 CT THORAX DX C+: CPT | Performed by: RADIOLOGY

## 2017-02-24 PROCEDURE — 93000 ELECTROCARDIOGRAM COMPLETE: CPT | Performed by: INTERNAL MEDICINE

## 2017-02-24 RX ORDER — ATENOLOL AND CHLORTHALIDONE TABLET 50; 25 MG/1; MG/1
1 TABLET ORAL DAILY
Qty: 30 TABLET | Refills: 11 | Status: SHIPPED | OUTPATIENT
Start: 2017-02-24 | End: 2018-05-17

## 2017-02-24 RX ORDER — IOPAMIDOL 755 MG/ML
107 INJECTION, SOLUTION INTRAVASCULAR ONCE
Status: COMPLETED | OUTPATIENT
Start: 2017-02-24 | End: 2017-02-24

## 2017-02-24 RX ADMIN — IOPAMIDOL 107 ML: 755 INJECTION, SOLUTION INTRAVASCULAR at 15:27

## 2017-02-24 NOTE — MR AVS SNAPSHOT
After Visit Summary   2/24/2017    Malvin Au    MRN: [id number]           Patient Information     Date Of Birth          1975        Visit Information        Provider Department      2/24/2017 1:20 PM Santi Wright MD [facility]        Today's Diagnoses     Essential hypertension    -  1    Right-sided chest pain        Right bundle branch block          Care Instructions    This summary includes the important diagnoses, test, medications and other important parts of your medical history.  Below are a few good we sites you can use to learn more about these.     Www.[employer].org : Up to date and easily searchable information on multiple topics.  Www.[employer].Upworthy/Pharmacy/c_539084.asp : [facility] $4.99 medications  Www.Litebi.gov : medication info, interactive tutorials, watch real surgeries online  Www.familydoctor.org : good info from the Academy of Family Physicians  Www.Podaddies.LibraryThing : good info from the [facility]  Www.cdc.gov : public health info, travel advisories, epidemics (H1N1)  Www.aap.org : children's health info, normal development, vaccinations  Www.health.[facility].[place].us : [place] dept of heatlh, public health issues in [place], N1N1    Based on your medical history and these are the current health maintenance or preventive care services that you are due for (some may have been done at this visit:)  Health Maintenance Due   Topic Date Due     TETANUS IMMUNIZATION (SYSTEM ASSIGNED)  04/10/1991     INFLUENZA VACCINE (SYSTEM ASSIGNED)  09/01/2016     =================================================================================  Normal Values   Blood pressure  <140/90 for most adults    <130/80 for some chronic diseases (ask your care team about yours)    BMI (body mass index)  18.5-25 kg/m2 (based on height and weight)     Thank you for visiting [facility]    Normal or non-critical lab and imaging results will be  communicated to you by MyChart, letter or phone within 7 days.  If you do not hear from us within 10 days, please call the clinic. If you have a critical or abnormal lab result, we will notify you by phone as soon as possible.     If you have any questions regarding your visit please contact:     Team Comfort:   Clinic Hours Telephone Number   Dr. Donald Petit   7am-5pm  Monday - Friday [phone]  Esequiel RN   Pharmacy 8am-8pm Monday-Thursday      8am-6pm Friday  9am-5pm Saturday-Sunday [phone]   Urgent Care 11am-8pm Monday-Friday        9am-5pm Saturday-Sunday [phone]     After hours, weekend or if you need to make an appointment with your primary provider please call [phone].   After Hours nurse advise: call [facility]: [phone]    Medication Refills:  Call your pharmacy and they will forward the refill to us. Please allow 3 business days for your refills to be completed.    Use [employer] (secure email communication and access to your chart) to send your primary care provider a message or make an appointment. Ask someone on your Team how to sign up for Uni2. To log on to [employer] or for more information in 3 day Blinds please visit the website at [email or web address].  As of October 8, 2013, all password changes, disabled accounts, or ID changes in Uni2/MyHealth will be done by our Access Services Department.   If you need help with your account or password, call: [phone]. Clinic staff no longer has the ability to change passwords.           Follow-ups after your visit        Future tests that were ordered for you today     Open Future Orders        Priority Expected Expires Ordered    CT Chest w Contrast Routine 2/24/2017 2/24/2018 2/24/2017            Who to contact     If you have questions or need follow up information about today's clinic visit or your schedule please contact [facility] directly at [phone].  Normal or non-critical lab and imaging results will be communicated to you by MyChart, letter or phone within 4 business days after the clinic has received the results. If you do not hear from us within 7 days, please contact the clinic through WishGeniehart or phone. If you have a critical or abnormal lab result, we will notify you by phone as soon as possible.  Submit refill requests through AppTrigger or call your pharmacy and they will forward the refill request to us. Please allow 3 business days for your refill to be completed.          Additional Information About Your Visit        WishGenieharLEPOW Information     AppTrigger gives you secure access to your electronic health record. If you see a primary care provider, you can also send messages to your care team and make appointments. If you have questions, please call your primary care clinic.  If you do not have a primary care provider, please call [phone] and they will assist you.        Care EveryWhere ID     This is your Care EveryWhere ID. This could be used by other organizations to access your [place] medical records  [id number]        Your Vitals Were     Pulse Temperature Height Pulse Oximetry BMI (Body Mass Index)       105 97.9  F (36.6  C) (Oral) 6' 0.25\" (1.835 m) 96% 29.5 kg/m2        Blood Pressure from Last 3 Encounters:   02/24/17 (!) 122/96   02/06/17 127/90   02/01/17 (!) 143/97    Weight from Last 3 Encounters:   02/24/17 219 lb (99.3 kg)   02/06/17 218 lb 6.4 oz (99.1 kg)   01/29/17 223 lb 1.7 oz (101.2 kg)              We Performed the Following     EKG 12-lead complete w/read - Clinics          Today's Medication Changes          These changes are accurate as of: 2/24/17  2:24 PM.  If you have any questions, ask your nurse or doctor.               Start taking these medicines.        Dose/Directions    atenolol-chlorthalidone 50-25 MG per tablet   Commonly known as:  TENORETIC   Used for:  Essential " hypertension   Started by:  Santi Wright MD        Dose:  1 tablet   Take 1 tablet by mouth daily   Quantity:  30 tablet   Refills:  11            Where to get your medicines      These medications were sent to [id number] - [place] - [address] [facility]  [address] [place] [address]    Hours:  test fax successful 4/5/04 kr Phone:  [phone]     atenolol-chlorthalidone 50-25 MG per tablet                Primary Care Provider Office Phone # Fax #    Mariya Cramer [phone]       [address] [address]        Thank you!     Thank you for choosing [facility]  for your care. Our goal is always to provide you with excellent care. Hearing back from our patients is one way we can continue to improve our services. Please take a few minutes to complete the written survey that you may receive in the mail after your visit with us. Thank you!             Your Updated Medication List - Protect others around you: Learn how to safely use, store and throw away your medicines at www.disposemymeds.org.          This list is accurate as of: 2/24/17  2:24 PM.  Always use your most recent med list.                   Brand Name Dispense Instructions for use    atenolol-chlorthalidone 50-25 MG per tablet    TENORETIC    30 tablet    Take 1 tablet by mouth daily       cyanocobalamin 500 MCG Subl sublingual tablet     150 tablet    Place 2 tablets (1,000 mcg) under the tongue daily       multivitamin, therapeutic with minerals Tabs tablet      Take 1 tablet by mouth daily       traMADol 50 MG tablet    ULTRAM    40 tablet    Take 1 tablet (50 mg) by mouth every 6 hours as needed       VITAMIN D (CHOLECALCIFEROL) PO      Take by mouth daily OTC

## 2017-02-24 NOTE — TELEPHONE ENCOUNTER
Reason for Call:  Request for results:    Name of test or procedure: CT Chest w Contrast     Date of test of procedure: 02/24/17    Location of the test or procedure: [facility]    OK to leave the result message on voice mail or with a family member? YES    Phone number Patient can be reached at:  Home number on file [phone] (home)    Additional comments: Pt calling for he had his CT scan done at [facility] and was told Dr. Wright would be contacting immediately after the CT Scan.  He would like for Dr. Wright to contact him before Tuesday 03/28/17 to go over the results.    Call taken on 2/24/2017 at 3:47 PM by Alli Beltrán

## 2017-02-24 NOTE — TELEPHONE ENCOUNTER
Patient informed by phone, CT result not available yet. Provider will contact patient as soon as result become available. Patient asked that provider check again Monday.  Bárbara Benítez MA

## 2017-02-24 NOTE — TELEPHONE ENCOUNTER
Unfortunately, CT chest results are not yet available. He will be notified as soon as it is avilable.

## 2017-02-24 NOTE — PATIENT INSTRUCTIONS
This summary includes the important diagnoses, test, medications and other important parts of your medical history.  Below are a few good we sites you can use to learn more about these.     Www.XATA.org : Up to date and easily searchable information on multiple topics.  Www.XATA.org/Pharmacy/c_539084.asp : [facility] $4.99 medications  Www.medlineplus.gov : medication info, interactive tutorials, watch real surgeries online  Www.familydoctor.org : good info from the Academy of Family Physicians  Www.Emerging Travelinic.com : good info from the [facility]  Www.cdc.gov : public health info, travel advisories, epidemics (H1N1)  Www.aap.org : children's health info, normal development, vaccinations  Www.health.[facility].[place].us : [place] dept of heat, public health issues in [place], N1N1    Based on your medical history and these are the current health maintenance or preventive care services that you are due for (some may have been done at this visit:)  Health Maintenance Due   Topic Date Due     TETANUS IMMUNIZATION (SYSTEM ASSIGNED)  04/10/1991     INFLUENZA VACCINE (SYSTEM ASSIGNED)  09/01/2016     =================================================================================  Normal Values   Blood pressure  <140/90 for most adults    <130/80 for some chronic diseases (ask your care team about yours)    BMI (body mass index)  18.5-25 kg/m2 (based on height and weight)     Thank you for visiting [facility]    Normal or non-critical lab and imaging results will be communicated to you by MyChart, letter or phone within 7 days.  If you do not hear from us within 10 days, please call the clinic. If you have a critical or abnormal lab result, we will notify you by phone as soon as possible.     If you have any questions regarding your visit please contact:     Team Comfort:   Clinic Hours Telephone Number   Dr. Donald Petit    7am-5pm  Monday - Friday [phone]  Esequiel XAVIER   Pharmacy 8am-8pm Monday-Thursday      8am-6pm Friday  9am-5pm Saturday-Sunday [phone]   Urgent Care 11am-8pm Monday-Friday        9am-5pm Saturday-Sunday [phone]     After hours, weekend or if you need to make an appointment with your primary provider please call [phone].   After Hours nurse advise: call [facility]: [phone]    Medication Refills:  Call your pharmacy and they will forward the refill to us. Please allow 3 business days for your refills to be completed.    Use [employer] (secure email communication and access to your chart) to send your primary care provider a message or make an appointment. Ask someone on your Team how to sign up for [employer]. To log on to Memeoirs or for more information in Active-Semi please visit the website at [email or web address].[email or web address].[email or web address]/[employer].  As of October 8, 2013, all password changes, disabled accounts, or ID changes in [employer]/MyHealth will be done by our Access Services Department.   If you need help with your account or password, call: [phone]. Clinic staff no longer has the ability to change passwords.     Right Bundle Branch Block    Right bundle branch block is a problem in the heart s electrical system. Your heart uses electrical signals to keep pumping normally. With right bundle branch block, some signals are slowed when they pass through the heart. This results in less blood flow being sent out to the body. It usually doesn t cause symptoms unless you have some other heart condition.  What happens with right bundle branch block  When you have right bundle branch block, there is a problem with the right branch of the system that sends the electrical signal to the right ventricle. The signal can t travel down this path the way it should. The signal still gets to the right ventricle, but it is slowed down compared with the left. Because of this, the right ventricle contracts a little later  than it should. This can cause the heart to pump out slightly less blood.  What causes right bundle branch block?  Right bundle branch block can result from a number of conditions, such as:    Heart disease from high blood pressure    Chronic obstructive lung disease (COPD)    Pulmonary embolism    Failure of the right side of the heart because of high blood pressure in the lungs (cor pulmonale)    Cardiomyopathy    Myocarditis    Heart attack    Congenital heart disease    Surgery or other procedures on the heart  In some case, the cause of right bundle branch block is not known. The heart structure may be normal.  Symptoms of right bundle branch block  Right bundle branch block does not cause symptoms on its own. It may make symptoms of other heart conditions worse.  Diagnosing right bundle branch block  Right bundle branch block is diagnosed with an electrocardiogram (EKG). This test looks at the heart s rhythm. The condition is often found during an EKG for another reason. Your doctor may want to check you for other health conditions. He or she may ask about your medical history and give you a medical exam. You may also have other tests, such as:    Echocardiogram, to look at your heart s motion and blood flow through the heart    Testing to check the health and function of your lungs    Blood tests to look at your overall health  Treatment for right bundle branch block  If you have no symptoms and no other heart conditions, you will likely need no treatment. If you have or may have heart disease, your health care provider will want to keep track of your heart health. You may be at higher risk for death after a heart attack.  Living with right bundle branch block  Your doctor may give you more instructions about how to manage your overall heart health. You might need to make lifestyle changes. These may include losing weight, quitting smoking, or improving your diet.  Keep track of any heart symptoms you have.  See your doctor regularly, even if you don t have any symptoms. Make sure all your health care providers know about your right bundle branch block.     When to call your health care provider  Call your health care provider right away if you have any of these:    New symptoms    Chest pain    Fainting    Shortness of breath     [id number] [employer]. [address] [place] [address], [place] [address]. All rights reserved. This information is not intended as a substitute for professional medical care. Always follow your healthcare professional's instructions.

## 2017-02-24 NOTE — NURSING NOTE
"Chief Complaint   Patient presents with     Headache     Fatigue     Musculoskeletal Problem     pain on right side of chest       Initial BP (!) 148/97 (BP Location: Left arm, Patient Position: Chair, Cuff Size: Adult Small)  Pulse 105  Temp 97.9  F (36.6  C) (Oral)  Ht 6' 0.25\" (1.835 m)  Wt 219 lb (99.3 kg)  SpO2 96%  BMI 29.5 kg/m2 Estimated body mass index is 29.5 kg/(m^2) as calculated from the following:    Height as of this encounter: 6' 0.25\" (1.835 m).    Weight as of this encounter: 219 lb (99.3 kg).  Medication Reconciliation: complete     Pa Lali Young MA      "

## 2017-02-24 NOTE — PROGRESS NOTES
[facility] INTERNAL MEDICINE PROGRESS NOTE    Malvin Au is a 41 year old male who presents to clinic today for the following health issues:    Headache     Onset: 3 weeks    Description:   Location: frontal   Character: throbbing pain  Frequency:  2-3 times per day  Duration:  1-2 hours    Intensity: severe    Progression of Symptoms:  same    Accompanying Signs & Symptoms:  Stiff neck: no  Neck or upper back pain: no  Fever: no  Sinus pressure: no  Nausea or vomiting: no  Dizziness: YES- lightheadedness  Numbness: no  Weakness: YES- fatigue  Visual changes: no   History:   Head trauma: No  Family history of migraines: no  Previous tests for headaches: no  Neurologist evaluations: no  Able to do daily activities: no  Wake with a headaches: YES  Do headaches wake you up: YES  Daily pain medication use: YES- tylenol  Work/school stressors/changes: no    Precipitating factors:   Does light make it worse: no  Does sound make it worse: no    Alleviating factors:  Does sleep help: YES         Therapies Tried and outcome: Tylenol      Chest Pain  Duration of complaint: acute   Description:   Location:  right side  Character: sharp  Radiation: none  Duration: intermittent   Intensity: moderate  Progression of Symptoms:  same  Accompanying Signs & Symptoms:  Shortness of breath: YES  Sweating: no   Nausea/vomiting: no   Lightheadedness: no   Palpitations: no  Fever/Chills: no   Cough: YES  Heartburn: no    History:   Family history of heart disease no   Tobacco use: no   Precipitating factors:   Worse with exertion: no   Worse with deep breaths :  no   Related to food: no   Alleviating factors: none  Therapies Tried and outcome: none     Hypertension Follow-up      Outpatient blood pressures monitoring: YES. Blood pressure readings at home still shows diastolic pressure above 90s and occasionally above 100s.    Low Salt Diet: yes    Adverse effects:  headaches, possibly from Lisinopril.    Compliance: excellent    Secondary causes: unknown but probably slightly worse with intake of sodium chloride tablets, given for hyponatremia (resolved last 2/17/17).    Chronic kidney disease: no    Hyperthyroidism: no    Anxiety: no    Decongestants: no    Substance abuse: none    Diabetes: no    Ischemic heart disease: no    Stroke: YES, extensive subarachnoid hemorrhage, secondary to aneurysmal rupture, S/P emergent endovascular  coil embolizaiton for ruptured anterior communicating artery aneurysm.     Hyperlipidemia: no         Patient Active Problem List   Diagnosis     SAH (subarachnoid hemorrhage) (H)     Essential hypertension     CARDIOVASCULAR SCREENING; LDL GOAL LESS THAN 130     Overweight (BMI 25.0-29.9)     Right bundle branch block     Past Surgical History   Procedure Laterality Date     Anesthesia out of or mri N/A 1/23/2017     Procedure: ANESTHESIA OUT OF OR MRI;  Surgeon: GENERIC ANESTHESIA PROVIDER;  Location:  OR     Eye surgery       lasik     Vascular surgery       angiogram       Social History   Substance Use Topics     Smoking status: Never Smoker     Smokeless tobacco: Not on file     Alcohol use Yes      Comment: a couple per day     History reviewed. No pertinent family history.      No Known Allergies  Recent Labs   Lab Test  01/31/17   0645  01/26/17   0500   01/23/17   1800   A1C   --    --    --   4.6   LDL   --    --    --   68   HDL   --    --    --   43   TRIG   --    --    --   91   CR  0.61*  0.58*   < >   --    GFRESTIMATED  >90  Non  GFR Calc    >90  Non  GFR Calc     < >   --    GFRESTBLACK  >90   GFR Calc    >90   GFR Calc     < >   --    POTASSIUM  3.4  3.6   < >   --    TSH   --    --    --   0.61    < > = values in this interval not displayed.      BP Readings from Last 3 Encounters:   02/24/17 (!) 122/96   02/06/17 127/90   02/01/17 (!) 143/97    Wt Readings from  "Last 3 Encounters:   02/24/17 219 lb (99.3 kg)   02/06/17 218 lb 6.4 oz (99.1 kg)   01/29/17 223 lb 1.7 oz (101.2 kg)                  Labs reviewed in EPIC  Problem list, Medication list, Allergies, and Medical/Social/Surgical histories [facility] and updated as appropriate.    ROS:  C: NEGATIVE for fever, chills, change in weight  I: NEGATIVE for worrisome rashes, moles or lesions  E: NEGATIVE for vision changes or irritation  E/M: NEGATIVE for ear, mouth and throat problems  RESP:POSITIVE for cough-non productive and NEGATIVE for hemoptysis, sputum  and wheezing  CV: NEGATIVE for claudication, cyanosis, diaphoresis, irregular heart beat, lower extremity edema, orthopnea, palpitations and phlebitis  GI: NEGATIVE for nausea, abdominal pain, heartburn, or change in bowel habits  : NEGATIVE for frequency, dysuria, or hematuria  M: NEGATIVE for significant arthralgias or myalgia  N: NEGATIVE for weakness, dizziness or paresthesias  E: NEGATIVE for temperature intolerance, skin/hair changes  H: NEGATIVE for bleeding problems  P: NEGATIVE for changes in mood or affect    OBJECTIVE:                                                    BP (!) 122/96  Pulse 105  Temp 97.9  F (36.6  C) (Oral)  Ht 6' 0.25\" (1.835 m)  Wt 219 lb (99.3 kg)  SpO2 96%  BMI 29.5 kg/m2  Body mass index is 29.5 kg/(m^2).  GENERAL: healthy, alert and no distress  EYES: Eyes grossly normal to inspection  RESP: lungs clear to auscultation - no rales, rhonchi or wheezes  CV: regular rate and rhythm, normal S1 S2, no S3 or S4, no murmur, click or rub, no peripheral edema and peripheral pulses strong  MS: no gross musculoskeletal defects noted, no edema  SKIN: no suspicious lesions or rashes  NEURO: Normal strength and tone, mentation intact and speech normal  PSYCH: mentation appears normal, affect normal/bright    Diagnostic Test Results:  Urine 133   mg/dL Final 02/06/2017  8:41 AM 59   Albumin Urine mg/L 6   mg/L Final 02/06/2017  8:41 AM 59 "   Albumin Urine mg/g Cr 4.28  0 - 17 mg/g Cr Final 02/06/2017  8:41 AM 59     Component Value Flag Ref Range Units Status Collected Lab   Sodium 142  133 - 144 mmol/L Final 02/17/2017  3:18 PM MG     Exam: CT chest with IV contrast 2/24/2017 4:11 PM     History: Chest pain, unspecified     Comparison: None     Technique: Helical CT imaging from the thoracic inlet through the  diaphragms with IV contrast. Multiplanar reconstructions including  axial and coronal. Images were reviewed in bone, soft tissue, and lung  windows. Contrast Dose: 107 ml isovue 370     Findings:     Chest:     Visualized thyroid is unremarkable. No abnormal axillary  lymphadenopathy. 13 mm short axis right hilar lymph node on series 2  image 26. 9 mm short axis left hilar node on series 2 image 28.  Prominent bilateral hilar soft tissue on series 2 image 30. Heart size  within normal limits. No pericardial effusion. No central pulmonary  embolism. Unremarkable appearance of the thoracic esophagus.     The central tracheobronchial tree is patent. No pleural effusion. Mild  hazy dependent groundglass, likely atelectasis. Patchy groundglass in  the lateral right lower lobe on series 8 image 174, likely  atelectatic. 5 mm subpleural nodule in the right middle lobe on series  8 image 186. 5 mm nodule in the right upper lobe on series 8 image  122. 4 mm subpleural nodule in the lateral left upper lobe on series 8  image 160. Nodularity along the left major fissure on series 8 image  148.     Upper abdomen:     Visualized upper abdomen is unremarkable.     Bones:     No aggressive appearing bony lesion.         Impression:   1. Scattered pulmonary nodules measuring up to 6 mm. Outside of a  known history of malignancy favor infectious or inflammatory etiology  such as sarcoidosis, however follow-up in 3-6 months is recommended.  2. Hilar lymphadenopathy, likely reactive.     [Recommend Follow Up: Lung nodule]     ASSESSMENT/PLAN:                                                             (I10) Uncontrolled hypertension, stage 1  (primary encounter diagnosis)  Comment: Not within goal range. Ideal SBP is less than 120. Ideal DBP is less than 80. Home BP monitoring consistently high DBP (more than 90s). Most probable cause of his frontal headaches. D/C Lisinopril. Start Atenolol/Chlorthalidone. Patient counseled about hypokalemia. Repeat BP and electrolytes during next clinic visit (two weeks)  Despite recent hyponatremia (triggered by subarachnoid hemorrhage), still recommend thiazide diuretics due to uncontrolled diastolic BP.  Plan: atenolol-chlorthalidone (TENORETIC) 50-25 MG         per tablet,          (R07.9) Right-sided chest pain  Comment: Pre-test probability is low due to absence of dyspnea, confirmed by recent CT of chest. However, it also shows right lateral lower opacity, suggestive of atelectasis, which may cause pleuritic pain. Suggested flutter valve such as Acapella device or even macrolide antibiotics if productive cough occurs. EKG does not show any acute ST-T changes.  Plan: CT Chest w Contrast            EKG 12-lead.    (I45.10) Right bundle branch block  Comment: Noted from EKG today. No history of COPD. May be due to uncontrolled hypertension or undiagnosed sleep apnea.      (R51) Frontal headache  Comment: Most likely due to uncontrolled hypertension. Patient correlates headaches with Lisinopril (unlikely but it may simply indicate inadequate BP control with current dose of Lisinopril).  Plan: D/C Lisinopirl. Go to ER if headaches persists even if BP is within goal range.    (R91.8) Pulmonary nodules  Comment: Three nodules, measuring less than 1 cm and non-calcified.  Plan: Despite low probability of malignancy since patient is non-smoker. Still Repeat CT of chest in 3 - 6 months.               Results explained to the patient.    (I60.9) SAH (subarachnoid hemorrhage) (H)  Comment: Triggered by malignant hypertension last month, leading  to aneurysmal rupture and subsequent coil embolization. No changes in mental status.  Plan: Go to ER if frontal headaches are worse or if mental status changes occur.    FUTURE APPOINTMENTS:       - Follow-up visit in 2 weeks.    Santi Wright MD  [facility]

## 2017-02-25 NOTE — TELEPHONE ENCOUNTER
"Patient's wife Becky calling. Verbal ok obtained from patient to speak with wife. Becky reports antibiotics not at pharmacy. Per caller Dr. Wright spoke with patient and gave CT results today. Per \"mychart\" message (this writer unable to find message in chart) wife read that azithromycin or acapella device from a medical supply store is recommended. Antibiotics are not at pharmacy. Spoke with provider on call and he does not see note indicating antibiotics would be ordered. Recommended patient try ibuprofen and if pain not controlled to be seen in [facility] or ER with note to provider to follow up with patient.   Chart routed to Dr. Wright.    Kayla Gaitan RN  [place] Nurse Advisor  [phone]    "

## 2017-02-25 NOTE — TELEPHONE ENCOUNTER
"Per review of Dr. Wright's Mychart message patient to try and use Tylenol before ibuprofen. Called wife Becky and gave her this information. Patient is \"a little better\".    Kayla Gaitan RN  [facility] Nurse Advisor  [phone]    "

## 2017-02-26 ENCOUNTER — TELEPHONE (OUTPATIENT)
Dept: NURSING | Facility: CLINIC | Age: 42
End: 2017-02-26

## 2017-02-26 NOTE — TELEPHONE ENCOUNTER
"Call Type: Triage Call    Presenting Problem: Wife Becky and patient calling\" Malvin was seen in  clinic on 2/24 see epic and imaging. He is still having upper right  chest pain, no coughing. \" Denies other or new sx since seen. Per  epic/imaging there is note of ordering  antibiotic if cough or pain  persist. Paged on call Dr. Montero (BP group) through page op to FNA at  12:05. Per MD. OK to order the Zpack, call clinic in am regarding  Acapella device (see note per imaging/MD) If pain worsens or  breathing sx develop advised ER. Call back as needed. Called  information to Becky (wife ) and RX to [facility]/[place].  Triage Note:  Guideline Title: Medication Questions - Adult ; Medication Questions -  Adult  Recommended Disposition: Call Provider within 4 Hours  Original Inclination: Wanted to speak with a nurse  Override Disposition: See Provider Immediately  Intended Action: Follow advice given  Physician Contacted: Yes  Recurrence of a symptom(s) or illness post prescribed medication treatment AND  provider instructed patient to call if symptom(s) returned. ?  YES  Sign(s) or symptom(s) associated with a diagnosed condition or with a new illness  ? NO  Prescription ordered today and not available at pharmacy putting patient at  clinical risk ? NO  Unable to obtain prescribed medication related to available resources AND  situation poses immediate clinical risk ? NO  Pharmacy calling to clarify prescription order. ? NO  Requests refill of prescribed medication that does NOT have a valid refill; lack  of medication may cause clinical risk to patient if not available. ? NO  Requests refill of prescribed medication without valid refills OR requests refill  of prescribed medication with valid refills but does not have prescription number  (no RX container); lack of medication does not put patient at clinical risk ? NO  Physician Instructions: See encounter/order  Care Advice: Have pharmacy phone number and prescription " information  available when you speak with provider.

## 2017-02-27 RX ORDER — AZITHROMYCIN 250 MG/1
TABLET, FILM COATED ORAL
Qty: 6 TABLET | Refills: 1 | Status: SHIPPED | OUTPATIENT
Start: 2017-02-27 | End: 2017-07-28

## 2017-02-27 NOTE — TELEPHONE ENCOUNTER
Written script for DME: Acapella Flutter Valve and demographic face sheet is faxed to [facility] at fax # [phone] per Dr. Wright.  Trevin Syed,  [facility]

## 2017-03-02 ENCOUNTER — TELEPHONE (OUTPATIENT)
Dept: FAMILY MEDICINE | Facility: CLINIC | Age: 42
End: 2017-03-02

## 2017-03-02 RX ORDER — AMLODIPINE BESYLATE 10 MG/1
10 TABLET ORAL EVERY EVENING
Qty: 30 TABLET | Refills: 5 | Status: SHIPPED | OUTPATIENT
Start: 2017-03-02 | End: 2017-03-02

## 2017-03-02 NOTE — TELEPHONE ENCOUNTER
Reason for call: Medication   If this is a refill request, has the caller requested the refill from the pharmacy already? N/A  Will the patient be using a [facility]? N/A  Name of the pharmacy and phone number for the current request: na    Name of the medication: the new blood pressure med    Other request: pt was told with the old BP med to take another dose if BP was high  They are asking if they should do that with his new med also  Reading today at 12:15pm was 140/100    Phone Number Pt can be reached at: [phone] (G)  Best Time: any  Can we leave a detailed message on this number? YES

## 2017-03-02 NOTE — TELEPHONE ENCOUNTER
Patient contacted and informed of the below per provider documentation. Patient verbalizes understanding.     Left a voice mail message on [facility] voice mail to cancel amlodipine. Med list updated.    Rajni Justice RN

## 2017-03-02 NOTE — TELEPHONE ENCOUNTER
Patient contacted. He does not have any symptoms. He was just randomly checking his blood pressure and does not know why this one was elevated. Patient's last few readings have been 115/77; 116/90; and 123/87. His most recent blood pressure was 127/89. Patient is wondering if provider still advises adding the Amlodipine, or should he wait and monitor for now? (See MyChart message)    Routing to provider to review and advise.   Rajni Justice RN

## 2017-03-02 NOTE — TELEPHONE ENCOUNTER
[facility]. Based on majority of blood pressure readings, notify Malvin to continue Atenolol-Chlorthalidone at same dose (1 tab daily). Best time to check BP is morning (as soon as he wakes up).

## 2017-03-20 DIAGNOSIS — I10 HYPERTENSION GOAL BP (BLOOD PRESSURE) < 140/90: Primary | ICD-10-CM

## 2017-03-20 RX ORDER — RAMIPRIL 10 MG/1
10 CAPSULE ORAL
Qty: 30 CAPSULE | Refills: 5 | Status: SHIPPED | OUTPATIENT
Start: 2017-03-20 | End: 2017-09-13

## 2017-04-03 DIAGNOSIS — E87.1 HYPONATREMIA: ICD-10-CM

## 2017-04-03 DIAGNOSIS — I10 ESSENTIAL HYPERTENSION: ICD-10-CM

## 2017-04-03 LAB
ANION GAP SERPL CALCULATED.3IONS-SCNC: 8 MMOL/L (ref 3–14)
BUN SERPL-MCNC: 13 MG/DL (ref 7–30)
CALCIUM SERPL-MCNC: 9 MG/DL (ref 8.5–10.1)
CHLORIDE SERPL-SCNC: 98 MMOL/L (ref 94–109)
CO2 SERPL-SCNC: 30 MMOL/L (ref 20–32)
CREAT SERPL-MCNC: 0.83 MG/DL (ref 0.66–1.25)
GFR SERPL CREATININE-BSD FRML MDRD: ABNORMAL ML/MIN/1.7M2
GLUCOSE SERPL-MCNC: 129 MG/DL (ref 70–99)
POTASSIUM SERPL-SCNC: 3.2 MMOL/L (ref 3.4–5.3)
SODIUM SERPL-SCNC: 136 MMOL/L (ref 133–144)

## 2017-04-03 PROCEDURE — 80048 BASIC METABOLIC PNL TOTAL CA: CPT | Performed by: INTERNAL MEDICINE

## 2017-04-03 PROCEDURE — 36415 COLL VENOUS BLD VENIPUNCTURE: CPT | Performed by: INTERNAL MEDICINE
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
## 2017-07-17 DIAGNOSIS — I67.1 CEREBRAL ANEURYSM, NONRUPTURED: Primary | ICD-10-CM

## 2017-07-24 ENCOUNTER — MYC MEDICAL ADVICE (OUTPATIENT)
Dept: FAMILY MEDICINE | Facility: CLINIC | Age: 42
End: 2017-07-24

## 2017-07-28 ENCOUNTER — OFFICE VISIT (OUTPATIENT)
Dept: FAMILY MEDICINE | Facility: CLINIC | Age: 42
End: 2017-07-28
Payer: COMMERCIAL

## 2017-07-28 VITALS
SYSTOLIC BLOOD PRESSURE: 110 MMHG | HEIGHT: 72 IN | HEART RATE: 74 BPM | BODY MASS INDEX: 32.51 KG/M2 | WEIGHT: 240 LBS | OXYGEN SATURATION: 98 % | TEMPERATURE: 97.3 F | DIASTOLIC BLOOD PRESSURE: 86 MMHG

## 2017-07-28 DIAGNOSIS — Z01.818 PREOP GENERAL PHYSICAL EXAM: Primary | ICD-10-CM

## 2017-07-28 DIAGNOSIS — I10 HYPERTENSION GOAL BP (BLOOD PRESSURE) < 130/80: ICD-10-CM

## 2017-07-28 DIAGNOSIS — N52.2 DRUG-INDUCED ERECTILE DYSFUNCTION: ICD-10-CM

## 2017-07-28 DIAGNOSIS — Z23 NEED FOR PROPHYLACTIC VACCINATION WITH TETANUS-DIPHTHERIA (TD): ICD-10-CM

## 2017-07-28 DIAGNOSIS — Z11.1 SCREENING EXAMINATION FOR PULMONARY TUBERCULOSIS: ICD-10-CM

## 2017-07-28 PROBLEM — L40.9 PSORIASIS: Status: ACTIVE | Noted: 2017-07-28

## 2017-07-28 LAB
ALBUMIN SERPL-MCNC: 4 G/DL (ref 3.4–5)
ALP SERPL-CCNC: 59 U/L (ref 40–150)
ALT SERPL W P-5'-P-CCNC: 82 U/L (ref 0–70)
ANION GAP SERPL CALCULATED.3IONS-SCNC: 6 MMOL/L (ref 3–14)
AST SERPL W P-5'-P-CCNC: 56 U/L (ref 0–45)
BILIRUB SERPL-MCNC: 0.7 MG/DL (ref 0.2–1.3)
BUN SERPL-MCNC: 15 MG/DL (ref 7–30)
CALCIUM SERPL-MCNC: 9.2 MG/DL (ref 8.5–10.1)
CHLORIDE SERPL-SCNC: 97 MMOL/L (ref 94–109)
CO2 SERPL-SCNC: 32 MMOL/L (ref 20–32)
CREAT SERPL-MCNC: 0.74 MG/DL (ref 0.66–1.25)
DIFFERENTIAL METHOD BLD: ABNORMAL
EOSINOPHIL # BLD AUTO: 0.2 10E9/L (ref 0–0.7)
EOSINOPHIL NFR BLD AUTO: 4 %
ERYTHROCYTE [DISTWIDTH] IN BLOOD BY AUTOMATED COUNT: 13.2 % (ref 10–15)
GFR SERPL CREATININE-BSD FRML MDRD: ABNORMAL ML/MIN/1.7M2
GLUCOSE SERPL-MCNC: 107 MG/DL (ref 70–99)
HCT VFR BLD AUTO: 40.6 % (ref 40–53)
HGB BLD-MCNC: 14.3 G/DL (ref 13.3–17.7)
INR PPP: 1.02 (ref 0.86–1.14)
LYMPHOCYTES # BLD AUTO: 1.1 10E9/L (ref 0.8–5.3)
LYMPHOCYTES NFR BLD AUTO: 18 %
MCH RBC QN AUTO: 33.1 PG (ref 26.5–33)
MCHC RBC AUTO-ENTMCNC: 35.2 G/DL (ref 31.5–36.5)
MCV RBC AUTO: 94 FL (ref 78–100)
MONOCYTES # BLD AUTO: 0.7 10E9/L (ref 0–1.3)
MONOCYTES NFR BLD AUTO: 12 %
NEUTROPHILS # BLD AUTO: 4.1 10E9/L (ref 1.6–8.3)
NEUTROPHILS NFR BLD AUTO: 66 %
PLATELET # BLD AUTO: 162 10E9/L (ref 150–450)
PLATELET # BLD EST: NORMAL 10*3/UL
POTASSIUM SERPL-SCNC: 3.5 MMOL/L (ref 3.4–5.3)
PROT SERPL-MCNC: 7.9 G/DL (ref 6.8–8.8)
RBC # BLD AUTO: 4.32 10E12/L (ref 4.4–5.9)
RBC MORPH BLD: NORMAL
SODIUM SERPL-SCNC: 135 MMOL/L (ref 133–144)
WBC # BLD AUTO: 6.1 10E9/L (ref 4–11)

## 2017-07-28 PROCEDURE — 85610 PROTHROMBIN TIME: CPT | Performed by: INTERNAL MEDICINE

## 2017-07-28 PROCEDURE — 36415 COLL VENOUS BLD VENIPUNCTURE: CPT | Performed by: INTERNAL MEDICINE

## 2017-07-28 PROCEDURE — 80053 COMPREHEN METABOLIC PANEL: CPT | Performed by: INTERNAL MEDICINE

## 2017-07-28 PROCEDURE — 85025 COMPLETE CBC W/AUTO DIFF WBC: CPT | Performed by: INTERNAL MEDICINE

## 2017-07-28 PROCEDURE — 90715 TDAP VACCINE 7 YRS/> IM: CPT | Performed by: INTERNAL MEDICINE

## 2017-07-28 PROCEDURE — 90471 IMMUNIZATION ADMIN: CPT | Performed by: INTERNAL MEDICINE

## 2017-07-28 PROCEDURE — 86480 TB TEST CELL IMMUN MEASURE: CPT | Performed by: INTERNAL MEDICINE

## 2017-07-28 PROCEDURE — 99214 OFFICE O/P EST MOD 30 MIN: CPT | Mod: 25 | Performed by: INTERNAL MEDICINE

## 2017-07-28 RX ORDER — CHLORTHALIDONE 25 MG/1
25 TABLET ORAL
Qty: 90 TABLET | Refills: 3 | Status: ON HOLD | OUTPATIENT
Start: 2017-07-28 | End: 2017-08-03

## 2017-07-28 RX ORDER — SILDENAFIL CITRATE 20 MG/1
20 TABLET ORAL DAILY PRN
Qty: 30 TABLET | Refills: 5 | Status: SHIPPED | OUTPATIENT
Start: 2017-07-28 | End: 2020-12-13

## 2017-07-28 RX ORDER — CARVEDILOL 6.25 MG/1
6.25 TABLET ORAL 2 TIMES DAILY WITH MEALS
Qty: 180 TABLET | Refills: 3 | Status: ON HOLD | OUTPATIENT
Start: 2017-07-28 | End: 2017-08-03

## 2017-07-28 NOTE — NURSING NOTE
"Chief Complaint   Patient presents with     Pre-Op Exam       Initial BP (!) 142/95 (BP Location: Left arm, Patient Position: Chair, Cuff Size: Adult Regular)  Pulse 74  Temp 97.3  F (36.3  C) (Oral)  Ht 6' 0.25\" (1.835 m)  Wt 240 lb (108.9 kg)  SpO2 98%  BMI 32.32 kg/m2 Estimated body mass index is 32.32 kg/(m^2) as calculated from the following:    Height as of this encounter: 6' 0.25\" (1.835 m).    Weight as of this encounter: 240 lb (108.9 kg).  Medication Reconciliation: complete   Jeremiah Young MA      "

## 2017-07-28 NOTE — PROGRESS NOTES
[address] [address]  [phone]  Dept: [phone]    PRE-OP EVALUATION:  Today's date: 2017    Malvin Au (: 1975) presents for pre-operative evaluation assessment as requested by Dr. Caruso.  He requires evaluation and anesthesia risk assessment prior to undergoing surgery/procedure for treatment of nonruptured cerebral aneurysm.  Proposed procedure: repeat cerebral angiogram    Date of Surgery/ Procedure: 8/3/17  Time of Surgery/ Procedure: 10AM  H[facility]Surgical Facility: [facility]  Fax number for surgical facility:   Primary Physician: Santi Wright  Type of Anesthesia Anticipated: General, to be determined     Patient has a Health Care Directive or Living Will:  NO    1. YES - DO YOU HAVE A HISTORY OF HEART ATTACK, STROKE (YES), STENT, BYPASS OR SURGERY ON AN ARTERY IN THE HEAD, NECK, HEART OR LEG?   2. NO - Do you ever have any pain or discomfort in your chest?  3. NO - Do you have a history of  Heart Failure?  4. NO - Are you troubled by shortness of breath when: walking on the level, up a slight hill or at night?  5. NO - Do you currently have a cold, bronchitis or other respiratory infection?  6. NO - Do you have a cough, shortness of breath or wheezing?  7. NO - Do you sometimes get pains in the calves of your legs when you walk?  8. NO - Do you or anyone in your family have previous history of blood clots?  9. NO - Do you or does anyone in your family have a serious bleeding problem such as prolonged bleeding following surgeries or cuts?  10. NO - Have you ever had problems with anemia or been told to take iron pills?  11. NO - Have you had any abnormal blood loss such as black, tarry or bloody stools, or abnormal vaginal bleeding?  12. NO - Have you ever had a blood transfusion?  13. NO - Have you or any of your relatives ever had problems with anesthesia?  14. NO - Do you have sleep apnea,  excessive snoring or daytime drowsiness?  15. NO - Do you have any prosthetic heart valves?  16. NO - Do you have prosthetic joints?  17. NO - Is there any chance that you may be pregnant?        HPI:                                                      Brief HPI related to upcoming procedure:            This is a 42 year old male who comes in today for a preoperative evaluation. Mr. Au is scheduled for a repeat cerebral angiogram on August 3, 2017. He has history of subarachnoid hemorrhage 6 months ago, due to a ruptured anterior communicating artery aneurysm, secondary to malignant hypertension. Prior to this event, he is not aware of his hypertension nor was he symptomatic until 1/23/2017. Mr. Au underwent emergent endovascular coil embolization on that day. Nonetheless, he is currently in stable condition with well-controlled hypertension with excellent compliance.                                 MEDICAL HISTORY:                                                    Patient Active Problem List    Diagnosis Date Noted     Right bundle branch block 02/24/2017     Priority: Medium     Subarachnoid hemorrhage (H) 02/24/2017     Priority: Medium     Essential hypertension 02/07/2017     Priority: Medium     Goal SBP < 120.  Goal DBP < 70.       CARDIOVASCULAR SCREENING; LDL GOAL LESS THAN 130 02/07/2017     Priority: Medium     Overweight (BMI 25.0-29.9) 02/07/2017     Priority: Medium     SAH (subarachnoid hemorrhage) (H) 01/24/2017     Priority: Medium      Past Medical History:   Diagnosis Date     Brain aneurysm      Cerebral infarction (H) 1-     Hypertension      Past Surgical History:   Procedure Laterality Date     ANESTHESIA OUT OF OR MRI N/A 1/23/2017    Procedure: ANESTHESIA OUT OF OR MRI;  Surgeon: GENERIC ANESTHESIA PROVIDER;  Location:  OR     EYE SURGERY      lasik     VASCULAR SURGERY      angiogram     Current Outpatient Prescriptions   Medication Sig Dispense Refill     ramipril  (ALTACE) 10 MG capsule Take 1 capsule (10 mg) by mouth daily (with dinner) 30 capsule 5     azithromycin (ZITHROMAX) 250 MG tablet Two tablets first day, then one tablet daily for four days. 6 tablet 1     order for DME Equipment being ordered: Acapella Flutter Valve. 1 each 0     diclofenac (VOLTAREN) 50 MG EC tablet Take 1 tablet (50 mg) by mouth 3 times daily as needed for moderate pain Take with meals. Do not take Aleve or Ibuprofen. 90 tablet 2     linaclotide (LINZESS) 145 MCG capsule Take 1 capsule (145 mcg) by mouth every morning (before breakfast) May also take with breakfast to improve potency. 30 capsule 5     atenolol-chlorthalidone (TENORETIC) 50-25 MG per tablet Take 1 tablet by mouth daily 30 tablet 11     traMADol (ULTRAM) 50 MG tablet Take 1 tablet (50 mg) by mouth every 6 hours as needed 40 tablet 0     cyanocobalamin 500 MCG SUBL sublingual tablet Place 2 tablets (1,000 mcg) under the tongue daily 150 tablet 0     multivitamin, therapeutic with minerals (THERA-VIT-M) TABS tablet Take 1 tablet by mouth daily       VITAMIN D, CHOLECALCIFEROL, PO Take by mouth daily OTC       OTC products: none    No Known Allergies   Latex Allergy: NO    Social History   Substance Use Topics     Smoking status: Never Smoker     Smokeless tobacco: Not on file     Alcohol use Yes      Comment: a couple per day     History   Drug Use No       REVIEW OF SYSTEMS:                                                    C: NEGATIVE for fever, chills, change in weight  I: POSITIVE for psoriasis. He is due for Quantiferon test prior to starting any DMARDs.  E: NEGATIVE for vision changes or irritation  E/M: NEGATIVE for ear, mouth and throat problems  R: NEGATIVE for significant cough or SOB  CV: NEGATIVE for chest pain, palpitations or peripheral edema  GI: NEGATIVE for nausea, abdominal pain, heartburn, or change in bowel habits   male :positive for erectile dysfunction, first noted before taking anti-hypertensive agents, but  worse after starting Ramipril and Atenolol-Chlorthalidone.  M: NEGATIVE for significant arthralgias or myalgia  N: NEGATIVE for weakness, dizziness or paresthesias  E: NEGATIVE for temperature intolerance, skin/hair changes  H: NEGATIVE for bleeding problems  P: NEGATIVE for changes in mood or affect    EXAM:                                                    There were no vitals taken for this visit.    GENERAL APPEARANCE: healthy, alert and no distress     EYES: Eyes grossly normal to inspection and conjunctivae and sclerae normal     RESP: lungs clear to auscultation - no rales, rhonchi or wheezes     CV: regular rates and rhythm, normal S1 S2, no S3 or S4 and no murmur, click or rub     MS: extremities normal- no gross deformities noted, no evidence of inflammation in joints, FROM in all extremities.     NEURO: Normal strength and tone, sensory exam grossly normal, mentation intact and speech normal     PSYCH: mentation appears normal. and affect normal/bright    DIAGNOSTICS:                                                    EKG not indicated due age of less than 60 years old. EKG 5 months ago shows normal sinus rhythm and incomplete right bundle branch block.    IMPRESSION:                                                    Reason for surgery/procedure: Cerebral angiography  Diagnosis/reason for consult: Preoperative evaluation.    The proposed surgical procedure is considered INTERMEDIATE risk.    REVISED CARDIAC RISK INDEX  The patient has the following serious cardiovascular risks for perioperative complications such as (MI, PE, VFib and 3  AV Block):  History of Cerebrovascular Disease last 1/23/2017)  INTERPRETATION: 1 risks: Class II (low risk - 0.9% complication rate)    The patient has the following additional risks for perioperative complications:  No identified additional risks    No diagnosis found.    RECOMMENDATIONS:                                                          --Patient is to take all  scheduled medications on the day of surgery except multivitamins.    APPROVAL GIVEN to proceed with proposed procedure, without further diagnostic evaluation       Signed Electronically by: Santi Wright MD    Copy of this evaluation report is provided to requesting physician.    [place] Preop Guidelines

## 2017-07-28 NOTE — MR AVS SNAPSHOT
After Visit Summary   7/28/2017    Malvin Au    MRN: [id number]           Patient Information     Date Of Birth          1975        Visit Information        Provider Department      7/28/2017 9:40 AM Santi Wright MD [facility]        Today's Diagnoses     Preop general physical exam    -  1    Need for prophylactic vaccination with tetanus-diphtheria (TD)        Screening examination for pulmonary tuberculosis          Care Instructions      Before Your Surgery      Call your surgeon if there is any change in your health. This includes signs of a cold or flu (such as a sore throat, runny nose, cough, rash or fever).    Do not smoke, drink alcohol or take over the counter medicine (unless your surgeon or primary care doctor tells you to) for the 24 hours before and after surgery.    If you take prescribed drugs: Follow your doctor s orders about which medicines to take and which to stop until after surgery.    Eating and drinking prior to surgery: follow the instructions from your surgeon    Take a shower or bath the night before surgery. Use the soap your surgeon gave you to gently clean your skin. If you do not have soap from your surgeon, use your regular soap. Do not shave or scrub the surgery site.  Wear clean pajamas and have clean sheets on your bed.     This summary includes the important diagnoses, test, medications and other important parts of your medical history.  Below are a few good we sites you can use to learn more about these.     Www.Vatgia.com.org : Up to date and easily searchable information on multiple topics.  Www.Vatgia.com.org/Pharmacy/c_539084.asp : [facility] $4.99 medications  Www.medlineplus.gov : medication info, interactive tutorials, watch real surgeries online  Www.familydoctor.org : good info from the Academy of Family Physicians  Www.mayoQui.ltinic.com : good info from the [facility]  Www.cdc.gov : public health info, travel  advisories, epidemics (H1N1)  Www.aap.org : children's health info, normal development, vaccinations  Www.health.[facility].[place].us : [place] dept of heat, public health issues in [place], N1N1    Based on your medical history and these are the current health maintenance or preventive care services that you are due for (some may have been done at this visit:)  Health Maintenance Due   Topic Date Due     TETANUS IMMUNIZATION (SYSTEM ASSIGNED)  04/10/1991     =================================================================================  Normal Values   Blood pressure  <140/90 for most adults    <130/80 for some chronic diseases (ask your care team about yours)    BMI (body mass index)  18.5-25 kg/m2 (based on height and weight)     Thank you for visiting [facility]    Normal or non-critical lab and imaging results will be communicated to you by MyChart, letter or phone within 7 days.  If you do not hear from us within 10 days, please call the clinic. If you have a critical or abnormal lab result, we will notify you by phone as soon as possible.     If you have any questions regarding your visit please contact:     Team Comfort:   Clinic Hours Telephone Number   Dr. Donald Petit   7am-5pm  Monday - Friday [phone]  [facility]   Pharmacy 8am-8pm Monday-Thursday      8am-6pm Friday  9am-5pm Saturday-Sunday [phone]   Urgent Care 11am-8pm Monday-Friday        9am-5pm Saturday-Sunday [phone]     After hours, weekend or if you need to make an appointment with your primary provider please call [phone].   After Hours nurse advise: call [facility]: [phone]    Medication Refills:  Call your pharmacy and they will forward the refill to us. Please allow 3 business days for your refills to be completed.    Use [employer] (secure email communication and access to your chart) to send your primary care provider  a message or make an appointment. Ask someone on your Team how to sign up for [employer]an. To log on to [employer]yard or for more information in Smart Panel please visit the website at [email or web address].  As of October 8, 2013, all password changes, disabled accounts, or ID changes in Glycosan/MyHealth will be done by our Access Services Department.   If you need help with your account or password, call: [phone]. Clinic staff no longer has the ability to change passwords.             Follow-ups after your visit        Your next 10 appointments already scheduled     Aug 03, 2017 11:30 AM CDT   IR CAROTID CEREBRAL ANGIOGRAM BILATERAL with SHIR3   [facility] Interventional Radiology ([facility])    [address] [place] [id number] [phone]           1. Your doctor will need to do a history and physical within 30 days before this procedure. 2. Your doctor will determine whether you need a 12 lead EKG, as well as which medications should not be taken the morning of the exam. 3. Laboratory tests are to be obtained by your doctor prior to the exam (creatinine, Hgb/Hct, platelet count, and INR) 4. If you have allergies to x-ray contrast or iodine, contact your doctor or a Radiology nurse prior to the exam day for instructions. 5. Someone will need to drive you to and from the hospital. 6. Bring a list of all drugs you are taking; include supplements and over-the-counter medications. 7. Wear comfortable clothes and leave your valuables at home. 8. If you are or may be pregnant, contact your doctor or a Radiology nurse prior to the day of the exam. 9.  If you have diabetes, check with your doctor or a Radiology nurse to see if your insulin needs to be adjusted for the exam. 10. If you are taking Coumadin (to thin you blood) please contact your doctor or a Radiology nurse at least 5 days before the exam for special instructions. 11. You should not have received barium (x-ray contrast)  within 48 hours of this exam. 12. The day before your exam you may eat your regular diet and are encouraged to drink at least 2 quarts of clear liquids. Drink no alcoholic beverages for 24 hours prior to the exam. 13. If you have a colostomy you will need to irrigate it with tap water at 8PM the evening before and again at 6AM the morning of the exam. 14. Do not smoke for 24 hours prior to the procedure. 15. Do not eat any solid food or milk products for 6 hours prior to the exam. You may drink clear liquids until 2 hours prior to the exam. Clear liquids include the following: water, Jell-O, clear broth, apple juice or any non-carbonated drink that you can see through (no pop!) 16. The morning of the exam you may brush your teeth and take medications as directed with a sip of water. 17. Tell the Radiology nurse if you have any allergies. 18. You will be asked to empty your bladder before the exam begins. 19. Following the exam you will need to remain on complete bedrest for 4-6 hours. The nurse will monitor your vital signs, puncture site, and the pulses and temperature of the arm or leg that was punctured. 20. When discharged, you cannot drive until morning, and an adult must be with you until then. You should stay in the [facility] area overnight.              Who to contact     If you have questions or need follow up information about today's clinic visit or your schedule please contact [facility] directly at [phone].  Normal or non-critical lab and imaging results will be communicated to you by MyChart, letter or phone within 4 business days after the clinic has received the results. If you do not hear from us within 7 days, please contact the clinic through BitPosterhart or phone. If you have a critical or abnormal lab result, we will notify you by phone as soon as possible.  Submit refill requests through [employer] or call your pharmacy and they will forward the refill request to us. Please  "allow 3 business days for your refill to be completed.          Additional Information About Your Visit        MyChart Information     Lumiyhart gives you secure access to your electronic health record. If you see a primary care provider, you can also send messages to your care team and make appointments. If you have questions, please call your primary care clinic.  If you do not have a primary care provider, please call [phone] and they will assist you.        Care EveryWhere ID     This is your Care EveryWhere ID. This could be used by other organizations to access your [place] medical records  [id number]        Your Vitals Were     Pulse Temperature Height Pulse Oximetry BMI (Body Mass Index)       74 97.3  F (36.3  C) (Oral) 6' 0.25\" (1.835 m) 98% 32.32 kg/m2        Blood Pressure from Last 3 Encounters:   07/28/17 110/86   02/24/17 (!) 122/96   02/06/17 127/90    Weight from Last 3 Encounters:   07/28/17 240 lb (108.9 kg)   02/24/17 219 lb (99.3 kg)   02/06/17 218 lb 6.4 oz (99.1 kg)              We Performed the Following     CBC with platelets and differential     Comprehensive metabolic panel (BMP + Alb, Alk Phos, ALT, AST, Total. Bili, TP)     INR     M Tuberculosis by Quantiferon     TDAP VACCINE (ADACEL)        Primary Care Provider Office Phone # Fax #    Santi Wright [phone]       [facility] [address]  [facility] [address]        Equal Access to Services     ADI MOORE : Hadii aad ku hadasho Soomaali, waaxda luqadaha, qaybta kaalmada adeegyada, waxay shanta gonzales. So [facility] [phone].    ATENCIÓN: Si habla español, tiene a garcia disposición servicios gratuitos de asistencia lingüística. Llame al [phone].    We comply with applicable federal civil rights laws and [place] laws. We do not discriminate on the basis of race, color, national origin, age, disability sex, sexual orientation or gender identity.          "   Thank you!     Thank you for choosing [facility]  for your care. Our goal is always to provide you with excellent care. Hearing back from our patients is one way we can continue to improve our services. Please take a few minutes to complete the written survey that you may receive in the mail after your visit with us. Thank you!             Your Updated Medication List - Protect others around you: Learn how to safely use, store and throw away your medicines at www.disposemymeds.org.          This list is accurate as of: 7/28/17 10:16 AM.  Always use your most recent med list.                   Brand Name Dispense Instructions for use Diagnosis    atenolol-chlorthalidone 50-25 MG per tablet    TENORETIC    30 tablet    Take 1 tablet by mouth daily    Uncontrolled hypertension, stage 1       multivitamin, therapeutic with minerals Tabs tablet      Take 1 tablet by mouth daily        ramipril 10 MG capsule    ALTACE    30 capsule    Take 1 capsule (10 mg) by mouth daily (with dinner)    Hypertension goal BP (blood pressure) < 140/90

## 2017-07-28 NOTE — NURSING NOTE
Screening Questionnaire for Adult Immunization    Are you sick today?   No   Do you have allergies to medications, food, a vaccine component or latex?   No   Have you ever had a serious reaction after receiving a vaccination?   No   Do you have a long-term health problem with heart disease, lung disease, asthma, kidney disease, metabolic disease (e.g. diabetes), anemia, or other blood disorder?   No   Do you have cancer, leukemia, HIV/AIDS, or any other immune system problem?   No   In the past 3 months, have you taken medications that affect  your immune system, such as prednisone, other steroids, or anticancer drugs; drugs for the treatment of rheumatoid arthritis, Crohn s disease, or psoriasis; or have you had radiation treatments?   No   Have you had a seizure, or a brain or other nervous system problem?   Yes   During the past year, have you received a transfusion of blood or blood     products, or been given immune (gamma) globulin or antiviral drug?   No   For women: Are you pregnant or is there a chance you could become        pregnant during the next month?   No   Have you received any vaccinations in the past 4 weeks?   No     Immunization questionnaire was positive for at least one answer.  Notified Dr. Wright.      [facility] doesn't apply on this patient     Screening performed by Jeremiah Young on 7/28/2017 at 10:33 AM.

## 2017-07-28 NOTE — PATIENT INSTRUCTIONS
Before Your Surgery      Call your surgeon if there is any change in your health. This includes signs of a cold or flu (such as a sore throat, runny nose, cough, rash or fever).    Do not smoke, drink alcohol or take over the counter medicine (unless your surgeon or primary care doctor tells you to) for the 24 hours before and after surgery.    If you take prescribed drugs: Follow your doctor s orders about which medicines to take and which to stop until after surgery.    Eating and drinking prior to surgery: follow the instructions from your surgeon    Take a shower or bath the night before surgery. Use the soap your surgeon gave you to gently clean your skin. If you do not have soap from your surgeon, use your regular soap. Do not shave or scrub the surgery site.  Wear clean pajamas and have clean sheets on your bed.     This summary includes the important diagnoses, test, medications and other important parts of your medical history.  Below are a few good we sites you can use to learn more about these.     Www.H-art (WPP[employer] : Up to date and easily searchable information on multiple topics.  Www.CyberHeart/Pharmacy/c_539084.asp : [facility] $4.99 medications  Www.Webrazzi.gov : medication info, interactive tutorials, watch real surgeries online  Www.familydoctor.org : good info from the Academy of Family Physicians  Www.MediVisioninic.com : good info from the [facility]  Www.cdc.gov : public health info, travel advisories, epidemics (H1N1)  Www.aap.org : children's health info, normal development, vaccinations  Www.health.[facility] : [place] dept of heatlh, public health issues in [place], N1N1    Based on your medical history and these are the current health maintenance or preventive care services that you are due for (some may have been done at this visit:)  Health Maintenance Due   Topic Date Due     TETANUS IMMUNIZATION (SYSTEM ASSIGNED)  04/10/1991      =================================================================================  Normal Values   Blood pressure  <140/90 for most adults    <130/80 for some chronic diseases (ask your care team about yours)    BMI (body mass index)  18.5-25 kg/m2 (based on height and weight)     Thank you for visiting [facility]    Normal or non-critical lab and imaging results will be communicated to you by MyChart, letter or phone within 7 days.  If you do not hear from us within 10 days, please call the clinic. If you have a critical or abnormal lab result, we will notify you by phone as soon as possible.     If you have any questions regarding your visit please contact:     Team Comfort:   Clinic Hours Telephone Number   Dr. Donald Garcia   [facility]   7am-5pm  Monday - Friday [phone]  [facility]   Pharmacy 8am-8pm Monday-Thursday      8am-6pm Friday  9am-5pm Saturday-Sunday [phone]   Urgent Care 11am-8pm Monday-Friday        9am-5pm Saturday-Sunday [phone]     After hours, weekend or if you need to make an appointment with your primary provider please call [phone].   After Hours nurse advise: call [facility]: [phone]    Medication Refills:  Call your pharmacy and they will forward the refill to us. Please allow 3 business days for your refills to be completed.    Use [email or web address] (secure email communication and access to your chart) to send your primary care provider a message or make an appointment. Ask someone on your Team how to sign up for [email or web address]. To log on to [employer] or for more information in [employer] please visit the website at [email or web address].  As of October 8, 2013, all password changes, disabled accounts, or ID changes in [employer]/MyHealth will be done by our Access Services Department.   If you need help with your account or password, call: [phone]. Clinic staff no longer has  the ability to change passwords.

## 2017-07-31 LAB
M TB TUBERC IFN-G BLD QL: NEGATIVE
M TB TUBERC IFN-G/MITOGEN IGNF BLD: 0 IU/ML

## 2017-08-03 ENCOUNTER — APPOINTMENT (OUTPATIENT)
Dept: INTERVENTIONAL RADIOLOGY/VASCULAR | Facility: CLINIC | Age: 42
End: 2017-08-03
Attending: NURSE PRACTITIONER
Payer: COMMERCIAL

## 2017-08-03 ENCOUNTER — HOSPITAL ENCOUNTER (OUTPATIENT)
Facility: CLINIC | Age: 42
Discharge: HOME | End: 2017-08-03
Attending: PSYCHIATRY & NEUROLOGY | Admitting: RADIOLOGY
Payer: COMMERCIAL

## 2017-08-03 VITALS
RESPIRATION RATE: 20 BRPM | WEIGHT: 237 LBS | SYSTOLIC BLOOD PRESSURE: 133 MMHG | BODY MASS INDEX: 32.1 KG/M2 | OXYGEN SATURATION: 97 % | HEIGHT: 72 IN | HEART RATE: 64 BPM | TEMPERATURE: 98 F | DIASTOLIC BLOOD PRESSURE: 99 MMHG

## 2017-08-03 DIAGNOSIS — I67.1 CEREBRAL ANEURYSM, NONRUPTURED: ICD-10-CM

## 2017-08-03 PROCEDURE — 25000128 H RX IP 250 OP 636: Performed by: RADIOLOGY

## 2017-08-03 PROCEDURE — 25000125 ZZHC RX 250: Performed by: RADIOLOGY

## 2017-08-03 PROCEDURE — 27210886 ZZH ACCESSORY CR5

## 2017-08-03 PROCEDURE — 27210732 ZZH ACCESSORY CR1

## 2017-08-03 PROCEDURE — 40000853 ZZH STATISTIC ANGIOGRAM, STENT, VERTEBRO PLASTY

## 2017-08-03 PROCEDURE — 25000128 H RX IP 250 OP 636: Performed by: NURSE PRACTITIONER

## 2017-08-03 PROCEDURE — C1769 GUIDE WIRE: HCPCS

## 2017-08-03 PROCEDURE — 36223 PLACE CATH CAROTID/INOM ART: CPT

## 2017-08-03 PROCEDURE — 99152 MOD SED SAME PHYS/QHP 5/>YRS: CPT

## 2017-08-03 PROCEDURE — 27210906 ZZH KIT CR8

## 2017-08-03 RX ORDER — FLUMAZENIL 0.1 MG/ML
0.2 INJECTION, SOLUTION INTRAVENOUS
Status: DISCONTINUED | OUTPATIENT
Start: 2017-08-03 | End: 2017-08-03 | Stop reason: HOSPADM

## 2017-08-03 RX ORDER — FENTANYL CITRATE 50 UG/ML
INJECTION, SOLUTION INTRAMUSCULAR; INTRAVENOUS
Status: DISCONTINUED
Start: 2017-08-03 | End: 2017-08-03 | Stop reason: HOSPADM

## 2017-08-03 RX ORDER — SODIUM CHLORIDE 9 MG/ML
INJECTION, SOLUTION INTRAVENOUS CONTINUOUS
Status: DISCONTINUED | OUTPATIENT
Start: 2017-08-03 | End: 2017-08-03 | Stop reason: HOSPADM

## 2017-08-03 RX ORDER — FENTANYL CITRATE 50 UG/ML
25-50 INJECTION, SOLUTION INTRAMUSCULAR; INTRAVENOUS EVERY 5 MIN PRN
Status: DISCONTINUED | OUTPATIENT
Start: 2017-08-03 | End: 2017-08-03 | Stop reason: HOSPADM

## 2017-08-03 RX ORDER — LIDOCAINE HYDROCHLORIDE 10 MG/ML
1-30 INJECTION, SOLUTION EPIDURAL; INFILTRATION; INTRACAUDAL; PERINEURAL
Status: COMPLETED | OUTPATIENT
Start: 2017-08-03 | End: 2017-08-03

## 2017-08-03 RX ORDER — IOPAMIDOL 612 MG/ML
150 INJECTION, SOLUTION INTRAVASCULAR ONCE
Status: COMPLETED | OUTPATIENT
Start: 2017-08-03 | End: 2017-08-03

## 2017-08-03 RX ORDER — LIDOCAINE 40 MG/G
CREAM TOPICAL
Status: DISCONTINUED | OUTPATIENT
Start: 2017-08-03 | End: 2017-08-03 | Stop reason: HOSPADM

## 2017-08-03 RX ORDER — NALOXONE HYDROCHLORIDE 0.4 MG/ML
.1-.4 INJECTION, SOLUTION INTRAMUSCULAR; INTRAVENOUS; SUBCUTANEOUS
Status: DISCONTINUED | OUTPATIENT
Start: 2017-08-03 | End: 2017-08-03 | Stop reason: HOSPADM

## 2017-08-03 RX ORDER — ACETAMINOPHEN 500 MG
500-1000 TABLET ORAL EVERY 6 HOURS PRN
Status: DISCONTINUED | OUTPATIENT
Start: 2017-08-03 | End: 2017-08-03 | Stop reason: HOSPADM

## 2017-08-03 RX ORDER — HEPARIN SODIUM 1000 [USP'U]/ML
INJECTION, SOLUTION INTRAVENOUS; SUBCUTANEOUS
Status: DISCONTINUED
Start: 2017-08-03 | End: 2017-08-03 | Stop reason: HOSPADM

## 2017-08-03 RX ADMIN — LIDOCAINE HYDROCHLORIDE 90 MG: 10 INJECTION, SOLUTION INFILTRATION; PERINEURAL at 13:28

## 2017-08-03 RX ADMIN — MIDAZOLAM HYDROCHLORIDE 1 MG: 1 INJECTION, SOLUTION INTRAMUSCULAR; INTRAVENOUS at 13:15

## 2017-08-03 RX ADMIN — SODIUM CHLORIDE: 9 INJECTION, SOLUTION INTRAVENOUS at 10:52

## 2017-08-03 RX ADMIN — FENTANYL CITRATE 50 MCG: 50 INJECTION, SOLUTION INTRAMUSCULAR; INTRAVENOUS at 13:15

## 2017-08-03 RX ADMIN — IOPAMIDOL 35 ML: 612 INJECTION, SOLUTION INTRAVENOUS at 13:37

## 2017-08-03 ASSESSMENT — VISUAL ACUITY
OU: NORMAL ACUITY

## 2017-08-03 NOTE — PROGRESS NOTES
Returned from IR. Awake and alert. Neuros intact. Groin site clean and dry without lumps, swelling or bruising.  Stable immediately post procedure.

## 2017-08-03 NOTE — DISCHARGE INSTRUCTIONS
Cerebral Angiogram Discharge Instructions - Femoral     After you go home:      Have an adult stay with you until tomorrow.    Drink extra fluids for 2 days.    You may resume your normal diet.    No smoking       For 24 hours - due to the sedation you received:    Relax and take it easy.    Do NOT make any important or legal decisions.    Do NOT drive or operate machines at home or at work.    Do NOT drink alcohol.    Care of Groin Puncture Site:      For the first 24 hrs - check the puncture site every 1-2 hours while awake.    For 2 days, when you cough, sneeze, laugh or move your bowels, hold your hand over the puncture site and press firmly.    Remove the bandaid after 24 hours. If there is minor oozing, apply another bandaid and remove it after 12 hours.    It is normal to have a small bruise or pea size lump at the site.    You may shower tomorrow. Do NOT take a bath, or use a hot tub or pool for at least 3 days. Do NOT scrub the site. Do not use lotion or powder near the puncture site.     Activity:            For 2 days:    No stooping or squatting    Do NOT do any heavy activity such as exercise, lifting, or straining.     No housework, yard work or any activity that make you sweat    Do NOT lift more than 10 pounds    Bleeding:      If you start bleeding from the site in your groin, lie down flat and press firmly on/above the site for 10 minutes.     Once bleeding stops, lay flat for 2 hours.     Call [facility] as soon as you can.       Call 911 right away if you have heavy bleeding or bleeding that does not stop.      Medicines:         Take your medications unless your provider tells you not to.     If you have stopped any medicines, check with your provider about when to restart them.        Follow Up Appointments:      Follow up with [facility]  as directed.    Call the clinic if:      You have increased pain or a large or growing hard lump around the site.    The site is red,  swollen, hot or tender.    Blood or fluid is draining from the site.    You have chills or a fever greater than 101 F (38 C).    Your leg turns feels numb, cool or changes color.    You have hives, a rash or unusual itching.    New pain in the back or belly that you cannot control with Tylenol.    You experience changes in your vision, hearing, balance, coordination, speech, thinking or memory.    You experience weakness in one or more extremity.    Any questions or concerns.    If you have questions or your original symptoms do not improve, call:         [facility] @ [phone]

## 2017-08-03 NOTE — IP AVS SNAPSHOT
MRN:[id number]                      After Visit Summary   8/3/2017    Malvin Au    MRN: [id number]           Visit Information        Department      8/3/2017  9:48 AM [facility]          Review of your medicines      UNREVIEWED medicines. Ask your doctor about these medicines        Dose / Directions    atenolol-chlorthalidone 50-25 MG per tablet   Commonly known as:  TENORETIC   Used for:  Uncontrolled hypertension, stage 1        Dose:  1 tablet   Take 1 tablet by mouth daily   Quantity:  30 tablet   Refills:  11       multivitamin, therapeutic with minerals Tabs tablet        Dose:  1 tablet   Take 1 tablet by mouth daily   Refills:  0       ramipril 10 MG capsule   Commonly known as:  ALTACE   Used for:  Hypertension goal BP (blood pressure) < 140/90        Dose:  10 mg   Take 1 capsule (10 mg) by mouth daily (with dinner)   Quantity:  30 capsule   Refills:  5       sildenafil 20 MG tablet   Commonly known as:  REVATIO/VIAGRA   Used for:  Drug-induced erectile dysfunction        Dose:  20 mg   Take 1 tablet (20 mg) by mouth daily as needed Use as needed prior to any sexual activity.   Quantity:  30 tablet   Refills:  5       VITAMIN B 12 PO        Dose:  250 mcg   Take 250 mcg by mouth daily   Refills:  0                Protect others around you: Learn how to safely use, store and throw away your medicines at www.disposemymeds.org.         Follow-ups after your visit         Care Instructions        Further instructions from your care team       Cerebral Angiogram Discharge Instructions - Femoral     After you go home:      Have an adult stay with you until tomorrow.    Drink extra fluids for 2 days.    You may resume your normal diet.    No smoking       For 24 hours - due to the sedation you received:    Relax and take it easy.    Do NOT make any important or legal decisions.    Do NOT drive or operate machines at home or at work.    Do NOT drink alcohol.    Care  of Groin Puncture Site:      For the first 24 hrs - check the puncture site every 1-2 hours while awake.    For 2 days, when you cough, sneeze, laugh or move your bowels, hold your hand over the puncture site and press firmly.    Remove the bandaid after 24 hours. If there is minor oozing, apply another bandaid and remove it after 12 hours.    It is normal to have a small bruise or pea size lump at the site.    You may shower tomorrow. Do NOT take a bath, or use a hot tub or pool for at least 3 days. Do NOT scrub the site. Do not use lotion or powder near the puncture site.     Activity:            For 2 days:    No stooping or squatting    Do NOT do any heavy activity such as exercise, lifting, or straining.     No housework, yard work or any activity that make you sweat    Do NOT lift more than 10 pounds    Bleeding:      If you start bleeding from the site in your groin, lie down flat and press firmly on/above the site for 10 minutes.     Once bleeding stops, lay flat for 2 hours.     Call [facility] as soon as you can.       Call 911 right away if you have heavy bleeding or bleeding that does not stop.      Medicines:         Take your medications unless your provider tells you not to.     If you have stopped any medicines, check with your provider about when to restart them.        Follow Up Appointments:      Follow up with [facility]  as directed.    Call the clinic if:      You have increased pain or a large or growing hard lump around the site.    The site is red, swollen, hot or tender.    Blood or fluid is draining from the site.    You have chills or a fever greater than 101 F (38 C).    Your leg turns feels numb, cool or changes color.    You have hives, a rash or unusual itching.    New pain in the back or belly that you cannot control with Tylenol.    You experience changes in your vision, hearing, balance, coordination, speech, thinking or memory.    You experience weakness in  one or more extremity.    Any questions or concerns.    If you have questions or your original symptoms do not improve, call:         [facility] @ [phone]           Additional Information About Your Visit        MyChart Information     [employer] gives you secure access to your electronic health record. If you see a primary care provider, you can also send messages to your care team and make appointments. If you have questions, please call your primary care clinic.  If you do not have a primary care provider, please call [phone] and they will assist you.        Care EveryWhere ID     This is your Care EveryWhere ID. This could be used by other organizations to access your [facility] medical records  [id number]        Your Vitals Were     Blood Pressure Pulse Temperature Respirations Height Weight    125/83 71 98  F (36.7  C) (Oral) 14 1.829 m (6') 107.5 kg (237 lb)    Pulse Oximetry BMI (Body Mass Index)                98% 32.14 kg/m2           Primary Care Provider Office Phone # Fax #    Santi Wright [phone]      Equal Access to Services     [facility]: Hadii aad ku hadasho Soomaali, waaxda luqadaha, qaybta kaalmada adeegyablank, nunu terry . So [facility] [phone].    ATENCIÓN: Si habla español, tiene a garcia disposición servicios gratuitos de asistencia lingüística. Llame al [phone].    We comply with applicable federal civil rights laws and [place] laws. We do not discriminate on the basis of race, color, national origin, age, disability sex, sexual orientation or gender identity.            Thank you!     Thank you for choosing [facility] for your care. Our goal is always to provide you with excellent care. Hearing back from our patients is one way we can continue to improve our services. Please take a few minutes to complete the written survey that you may receive in the mail after you visit with us. Thank you!             Medication  List: This is a list of all your medications and when to take them. Check marks below indicate your daily home schedule. Keep this list as a reference.      Medications           Morning Afternoon Evening Bedtime As Needed    atenolol-chlorthalidone 50-25 MG per tablet   Commonly known as:  TENORETIC   Take 1 tablet by mouth daily                                multivitamin, therapeutic with minerals Tabs tablet   Take 1 tablet by mouth daily                                ramipril 10 MG capsule   Commonly known as:  ALTACE   Take 1 capsule (10 mg) by mouth daily (with dinner)                                sildenafil 20 MG tablet   Commonly known as:  REVATIO/VIAGRA   Take 1 tablet (20 mg) by mouth daily as needed Use as needed prior to any sexual activity.                                VITAMIN B 12 PO   Take 250 mcg by mouth daily

## 2017-08-03 NOTE — IP AVS SNAPSHOT
[place] [address]    [place] [address]    Phone:  [phone]                                       After Visit Summary   8/3/2017    Malvin Au    MRN: [id number]           After Visit Summary Signature Page     I have received my discharge instructions, and my questions have been answered. I have discussed any challenges I see with this plan with the nurse or doctor.    ..........................................................................................................................................  Patient/Patient Representative Signature      ..........................................................................................................................................  Patient Representative Print Name and Relationship to Patient    ..................................................               ................................................  Date                                            Time    ..........................................................................................................................................  Reviewed by Signature/Title    ...................................................              ..............................................  Date                                                            Time

## 2017-08-03 NOTE — PROGRESS NOTES
Care Suites Discharge Summary    Discharge Criteria:   Discharge Criteria met per MD orders: Yes.   Vital signs stable.     Pt demonstrates ability to ambulate safely: Yes.  (See discharge questionnaire for additional information)    Discharge instructions & education:   Discharge instructions reviewed with patient and spouse. Patient verbalizes  understanding.   Additional patient education provided:  none.     Medications:   Patient will be discharging on new medications- No. Patient verbalizes reason for use, start date, and side effects NA.    Items returned to patient:   Home and hospital acquired medications returned to patient NA   Listed belongings gathered and returned to patient: Yes    Patient discharged to home  with wife driving.    Glenny Torres

## 2017-08-03 NOTE — PROGRESS NOTES
Patient for cerebral angiogram. Exam is post procedural exam. Neuro assessment without deficit. Procedure explained to patient and wife. Understanding voiced. SHAAN Duran discussed and consented for procedure. Contrast consent signed. Patient readied for procedure.

## 2017-08-03 NOTE — PROGRESS NOTES
Pt up and ambulating in halls to bathroom with stand by assist and tolerated activity well. Right groin site remains stable with activity. Pt walked in halls after bathroom. Pt states voided good amount when up.  No additional questions regarding D/C  At this time. Pt denies pain, nausea  or vomiting.  Tolerating po food and fluids. Neuro exam intact with no changes post procedure.  D/C to home with wife driving.

## 2017-08-03 NOTE — PROGRESS NOTES
Interventional Radiology Intra-procedural Nursing Note    Patient Name: Malvin Au  Medical Record Number: [id number]  Today's Date: August 3, 2017    Start Time: 1315  End of procedure time: 1337  Procedure: Cerebral Angiogram follow up cerebral aneurysm coiling  Report given to: CS RN  Time pt departs:  1404  Fire Risk Assesment: 0-1    Other Notes: 1304 Received Pt from  via walking to IR#2  1315 Pt transferred to procedure table into supine position.  Skin prepped with chloroprep (CHG 2%) and draped. Procedure started  1337 EOC, Pt tolerated procedure well with minimal sedation.   1404 Pt to  via bed without complaints, no signs oozing or hematoma to right puncture site    Bruce Louis

## 2017-08-03 NOTE — PROGRESS NOTES
[facility] Pre-Procedure Note      Reason for procedure: 6-month follow up cerebral angiogram for aneurysm surveillance s/p subarachnoid hemorrhage and endovascular coil embolization of a previously ruptured and previously coiled anterior communicating artery aneurysm on 1/23/2017    History and physical reviewed and no updates needed (7/28/2017)    Past Medical History:   Diagnosis Date     Brain aneurysm      Cerebral infarction (H) 1-     Hypertension      Right bundle branch block      Past Surgical History:   Procedure Laterality Date     ANESTHESIA OUT OF OR MRI N/A 1/23/2017    Procedure: ANESTHESIA OUT OF OR MRI;  Surgeon: GENERIC ANESTHESIA PROVIDER;  Location:  OR     EYE SURGERY      lasik     VASCULAR SURGERY      angiogram       Imaging:   ENDOVASCULAR COIL EMBOLIZATION OF A RUPTURED ANTERIOR COMMUNICATING  ARTERY ANEURYSM  1/23/2017  CONCLUSION:   -Irregular 5.2 x 5 mm anterior communicating artery aneurysm with a  broad 3 mm neck and a leftward directed 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   -Technically successful coil embolization with complete angiographic  closure of the daughter bleb/pseudoaneurysm as the presumed ruptured  site, and good coil packing of the aneurysm proper, with mild  persistent interstitial filling.  -Follow-up angiography in 6 months.      Medications:  No current facility-administered medications for this encounter.     Facility-Administered Medications Ordered in Other Encounters:      heparin (porcine) 1000 UNIT/ML injection, , , ,     Allergies:   No Known Allergies    Pre-Procedure Assessment done at 1030.  BP (!) 141/103 (BP Location: Left arm)  Pulse 70  Temp 98  F (36.7  C) (Oral)  Resp 18  Ht 1.829 m (6')  Wt 107.5 kg (237 lb)  SpO2 98%  BMI 32.14 kg/m2  Heart: Normal heart sounds and rate  Lungs: Lungs Clear with good breath sounds bilaterally.  Mallampati:  Grade 3:  Soft palate visible, posterior pharyngeal  wall not visible  ASA Class:  Class 2 - MILD SYSTEMIC DISEASE, NO ACUTE PROBLEMS, NO FUNCTIONAL LIMITATIONS.  Abnormal reaction to sedation in the past: No   Expected Level:  Moderate Sedation  PO Intake:  Appropriately NPO for procedure  Sleep apnea: No    Labs:  Lab Results   Component Value Date    HGB 14.3 07/28/2017     Lab Results   Component Value Date     07/28/2017     Recent Labs   Lab Test  07/28/17   1018  04/03/17   1459   CR  0.74  0.83     Lab Results   Component Value Date    INR 1.02 07/28/2017       Impression: This is a 42 year old male with a history of SAH in January of this year from a ruptured anterior communicating artery aneurysm. This aneurysm was treated with endovascular coil embolization at that time. He has recovered nicely. Neurologically intact with occasional mild headaches. He is adequately NPO and medically stable for procedure.     Plan: Cerebral angiogram    Cerebral angiogram procedure its risks, benefits, and alternatives including but not limited to stroke, bleeding, renal failure, contrast dye or medication reaction, vessel injury, infection were discussed with the patient and his wife. Questions answered and the patient gives written and verbal consent to proceed.      Britt Duran, CNP

## 2017-08-03 NOTE — PROCEDURES
Interventional Neuroradiology Post Procedure    Patient Name: Malvin Au  MRN: [id number]  Date of Procedure: August 3, 2017    Procedure: Cerebral angiogram under moderate IV sedation  Radiologist: Gamaliel Cr    Contrast: 35 ml Isovue  Fluoro Time: 1.6 minutes  Air Kerma: 135 mGy  Medications: Versed 1 mg IV                       Fentanyl 50 mcg IV  Sedation Time: 22 minutes    EBL: minimal  Complications: none    Preliminary Report:   (See dictation for full detail)  Very mild interval coil compaction within the neck of coil embolized right acomm aneurysm.    Assess/Plan:  FU angiogram in 1 year. We will contact him to schedule.  Bedrest with right lower extremity straight for 4 hours.  D/C to home after bedrest if stable.    Gamaliel Cr MD  [phone]

## 2017-08-25 DIAGNOSIS — I10 HYPERTENSION GOAL BP (BLOOD PRESSURE) < 140/90: Primary | ICD-10-CM

## 2017-08-25 RX ORDER — METOPROLOL SUCCINATE 50 MG/1
50 TABLET, EXTENDED RELEASE ORAL DAILY
Qty: 90 TABLET | Refills: 3 | Status: SHIPPED | OUTPATIENT
Start: 2017-08-25 | End: 2017-12-11

## 2017-08-25 RX ORDER — CHLORTHALIDONE 25 MG/1
25 TABLET ORAL DAILY
Qty: 90 TABLET | Refills: 3 | Status: SHIPPED | OUTPATIENT
Start: 2017-08-25 | End: 2018-05-17

## 2018-02-20 ENCOUNTER — MYC MEDICAL ADVICE (OUTPATIENT)
Dept: FAMILY MEDICINE | Facility: CLINIC | Age: 43
End: 2018-02-20

## 2018-05-17 ENCOUNTER — OFFICE VISIT (OUTPATIENT)
Dept: FAMILY MEDICINE | Facility: CLINIC | Age: 43
End: 2018-05-17
Payer: COMMERCIAL

## 2018-05-17 VITALS
WEIGHT: 243 LBS | HEART RATE: 79 BPM | SYSTOLIC BLOOD PRESSURE: 149 MMHG | DIASTOLIC BLOOD PRESSURE: 101 MMHG | TEMPERATURE: 98.4 F | BODY MASS INDEX: 32.91 KG/M2 | OXYGEN SATURATION: 97 % | HEIGHT: 72 IN

## 2018-05-17 DIAGNOSIS — I10 HYPERTENSION GOAL BP (BLOOD PRESSURE) < 140/90: Primary | ICD-10-CM

## 2018-05-17 PROBLEM — I60.9 SUBARACHNOID HEMORRHAGE (H): Status: RESOLVED | Noted: 2017-02-24 | Resolved: 2018-05-17

## 2018-05-17 PROBLEM — Z86.79 PERSONAL HISTORY OF SUBARACHNOID HEMORRHAGE: Status: ACTIVE | Noted: 2017-01-24

## 2018-05-17 PROCEDURE — 99214 OFFICE O/P EST MOD 30 MIN: CPT | Performed by: INTERNAL MEDICINE

## 2018-05-17 RX ORDER — RAMIPRIL 10 MG/1
CAPSULE ORAL
Qty: 90 CAPSULE | Refills: 3 | Status: SHIPPED | OUTPATIENT
Start: 2018-05-17 | End: 2019-07-11

## 2018-05-17 RX ORDER — ATENOLOL AND CHLORTHALIDONE TABLET 50; 25 MG/1; MG/1
1 TABLET ORAL DAILY
Qty: 90 TABLET | Refills: 3 | Status: SHIPPED | OUTPATIENT
Start: 2018-05-17 | End: 2019-04-24

## 2018-05-17 ASSESSMENT — PAIN SCALES - GENERAL: PAINLEVEL: NO PAIN (0)

## 2018-05-17 NOTE — PATIENT INSTRUCTIONS
At [facility], we strive to deliver an exceptional experience to you, every time we see you.  If you receive a survey in the mail, please send us back your thoughts. We really do value your feedback.    Based on your medical history, these are the current health maintenance/preventive care services that you are due for (some may have been done at this visit.)  Health Maintenance Due   Topic Date Due     HIV SCREEN (SYSTEM ASSIGNED)  04/10/1991       Suggested websites for health information:  Www.PhotoBox.org : Up to date and easily searchable information on multiple topics.  Www.medlineplus.gov : medication info, interactive tutorials, watch real surgeries online  Www.familydoctor.org : good info from the Academy of Family Physicians  Www.cdc.gov : public health info, travel advisories, epidemics (H1N1)  Www.aap.org : children's health info, normal development, vaccinations  Www.health.[facility].[place].us : [place] dept of health, public health issues in [place], N1N1    Your care team:                            Family Medicine Internal Medicine   MD Santi Marie MD Shantel Branch-Fleming, MD Katya Georgiev PA-C Megan Hill, APRN CNP    Jose Lindsey MD Pediatrics   Eliu Wofle, PAAlexaC  Carmencita Car, CNP MD Donna Melton APRN CNP   MD Rosa Barker MD Deborah Mielke, MD Kim Thein, APRN CNP      Clinic hours: Monday - Thursday 7 am-7 pm; Fridays 7 am-5 pm.   Urgent care: Monday - Friday 11 am-9 pm; Saturday and Sunday 9 am-5 pm.  Pharmacy : Monday -Thursday 8 am-8 pm; Friday 8 am-6 pm; Saturday and Sunday 9 am-5 pm.     Clinic: [phone]   Pharmacy: [phone]

## 2018-05-17 NOTE — PROGRESS NOTES
"SUBJECTIVE:   CC: Malvin Au is an 43 year old male who presents for preventative health visit.     Physical   Annual:     Getting at least 3 servings of Calcium per day::  NO    Bi-annual eye exam::  NO    Dental care twice a year::  Yes    Sleep apnea or symptoms of sleep apnea::  None    Diet::  Regular (no restrictions)    Frequency of exercise::  1 day/week    Duration of exercise::  30-45 minutes    Taking medications regularly::  Yes    Medication side effects::  Lightheadedness    Additional concerns today::  YES            {Outside tests to abstract? :815139}    Hypertension Follow-up      Outpatient blood pressures are being checked at home.  Results are high.    Low Salt Diet: { :254168::\"no added salt\"}      Today's PHQ-2 Score:   PHQ-2 ( 1999 Pfizer) 5/15/2018   Q1: Little interest or pleasure in doing things 0   Q2: Feeling down, depressed or hopeless 0   PHQ-2 Score 0   Q1: Little interest or pleasure in doing things Not at all   Q2: Feeling down, depressed or hopeless Not at all   PHQ-2 Score 0       Abuse: Current or Past(Physical, Sexual or Emotional)- NO  Do you feel safe in your environment - YES    Social History   Substance Use Topics     Smoking status: Never Smoker     Smokeless tobacco: Never Used     Alcohol use Yes      Comment: a couple per day     Alcohol Use 5/15/2018   If you drink alcohol do you typically have greater than 3 drinks per day OR greater than 7 drinks per week? Yes   AUDIT SCORE  7   {add AUDIT responses (Optional) (A score of 7 for adult men is an indication of hazardous drinking; a score of 8 or more is an indication of an alcohol use disorder.  A score of 7 or more for adult women is an indication of hazardous drinking or an alchohol use disorder):696395}    Last PSA: No results found for: PSA    Reviewed orders with patient. Reviewed health maintenance and updated orders accordingly - Yes  {Chronicprobdata (Optional):351997}    Reviewed and updated as needed " "this visit by clinical staff  Allergies  Meds  Problems         Reviewed and updated as needed this visit by Provider        {HISTORY OPTIONS (Optional):222287}    Review of Systems  {MALE ROS-adult preventive care package:136450::\"CONSTITUTIONAL: NEGATIVE for fever, chills, change in weight\",\"INTEGUMENTARY/SKIN: NEGATIVE for worrisome rashes, moles or lesions\",\"EYES: NEGATIVE for vision changes or irritation\",\"ENT: NEGATIVE for ear, mouth and throat problems\",\"RESP: NEGATIVE for significant cough or SOB\",\"CV: NEGATIVE for chest pain, palpitations or peripheral edema\",\"GI: NEGATIVE for nausea, abdominal pain, heartburn, or change in bowel habits\",\" male: negative for dysuria, hematuria, decreased urinary stream, erectile dysfunction, urethral discharge\",\"MUSCULOSKELETAL: NEGATIVE for significant arthralgias or myalgia\",\"NEURO: NEGATIVE for weakness, dizziness or paresthesias\",\"PSYCHIATRIC: NEGATIVE for changes in mood or affect\"}    OBJECTIVE:   There were no vitals taken for this visit.    Physical Exam  {Exam Choices:496995}    ASSESSMENT/PLAN:   {Diag Picklist:818373}    COUNSELING:   {MALE COUNSELING MESSAGES:453144::\"Reviewed preventive health counseling, as reflected in patient instructions\"}    {BP Counseling- Complete if BP >= 120/80  (Optional):232538}     reports that he has never smoked. He has never used smokeless tobacco.  {Tobacco Cessation -- Complete if patient is a smoker (Optional):026344}  Estimated body mass index is 32.14 kg/(m^2) as calculated from the following:    Height as of 8/3/17: 6' (1.829 m).    Weight as of 8/3/17: 237 lb (107.5 kg).   {Weight Management Plan (ACO) Complete if BMI is abnormal-  Ages 18-64  BMI >24.9.  Age 65+ with BMI <23 or >30 (Optional):697139}    Counseling Resources:  ATP IV Guidelines  Pooled Cohorts Equation Calculator  FRAX Risk Assessment  ICSI Preventive Guidelines  Dietary Guidelines for Americans, 2010  USDA's MyPlate  ASA Prophylaxis  Lung CA " Screening    Santi Wright MD  [facility]  Answers for HPI/ROS submitted by the patient on 5/15/2018   PHQ-2 Score: 0

## 2018-05-17 NOTE — MR AVS SNAPSHOT
After Visit Summary   5/17/2018    Malvin Au    MRN: [id number]           Patient Information     Date Of Birth          1975        Visit Information        Provider Department      5/17/2018 8:20 AM Santi Wright MD [facility]        Today's Diagnoses     Hypertension goal BP (blood pressure) < 140/90    -  1      Care Instructions    At [facility], we strive to deliver an exceptional experience to you, every time we see you.  If you receive a survey in the mail, please send us back your thoughts. We really do value your feedback.    Based on your medical history, these are the current health maintenance/preventive care services that you are due for (some may have been done at this visit.)  Health Maintenance Due   Topic Date Due     HIV SCREEN (SYSTEM ASSIGNED)  04/10/1991       Suggested websites for health information:  WwwCloudArena : Up to date and easily searchable information on multiple topics.  Www.Glu Mobile.gov : medication info, interactive tutorials, watch real surgeries online  Www.familydoctor.org : good info from the Academy of Family Physicians  Www.cdc.gov : public health info, travel advisories, epidemics (H1N1)  Www.aap.org : children's health info, normal development, vaccinations  Www.health.[facility].[place].us : [place] dept of health, public health issues in [place], N1N1    Your care team:                            Family Medicine Internal Medicine   MD Santi Marie MD Shantel Branch-Fleming, MD Katya Georgiev PA-C Megan Hill, PRAKASH Lindsey MD Pediatrics   YULISSA Mcgarry, MD Donna Hopper APRN CNP   MD Rosa Barker MD Deborah Mielke, MD Kim Thein, APRN [facility]      Clinic hours: Monday - Thursday 7 am-7 pm; Fridays 7 am-5 pm.   Urgent care: Monday - Friday 11 am-9 pm; Saturday and Sunday 9 am-5 pm.  Pharmacy : Monday -Thursday 8 am-8 pm;  "Friday 8 am-6 pm; Saturday and Sunday 9 am-5 pm.     Clinic: [phone]   Pharmacy: [phone]              Follow-ups after your visit        Future tests that were ordered for you today     Open Future Orders        Priority Expected Expires Ordered    Albumin Random Urine Quantitative with Creat Ratio Routine  5/17/2019 5/17/2018            Who to contact     If you have questions or need follow up information about today's clinic visit or your schedule please contact [facility] directly at [phone].  Normal or non-critical lab and imaging results will be communicated to you by Fludhart, letter or phone within 4 business days after the clinic has received the results. If you do not hear from us within 7 days, please contact the clinic through [employer] or phone. If you have a critical or abnormal lab result, we will notify you by phone as soon as possible.  Submit refill requests through Beep or call your pharmacy and they will forward the refill request to us. Please allow 3 business days for your refill to be completed.          Additional Information About Your Visit        MyChart Information     Beep gives you secure access to your electronic health record. If you see a primary care provider, you can also send messages to your care team and make appointments. If you have questions, please call your primary care clinic.  If you do not have a primary care provider, please call [phone] and they will assist you.        Care EveryWhere ID     This is your Care EveryWhere ID. This could be used by other organizations to access your [place] medical records  [id number]        Your Vitals Were     Pulse Temperature Height Pulse Oximetry BMI (Body Mass Index)       79 98.4  F (36.9  C) (Oral) 6' 0.25\" (1.835 m) 97% 32.73 kg/m2        Blood Pressure from Last 3 Encounters:   05/17/18 (!) 149/101   08/03/17 (!) 133/99   07/28/17 110/86    Weight from Last 3 Encounters: "   05/17/18 243 lb (110.2 kg)   08/03/17 237 lb (107.5 kg)   07/28/17 240 lb (108.9 kg)              We Performed the Following     Basic metabolic panel  (Ca, Cl, CO2, Creat, Gluc, K, Na, BUN)          Today's Medication Changes          These changes are accurate as of 5/17/18  8:49 AM.  If you have any questions, ask your nurse or doctor.               These medicines have changed or have updated prescriptions.        Dose/Directions    ramipril 10 MG capsule   Commonly known as:  ALTACE   This may have changed:  See the new instructions.   Used for:  Hypertension goal BP (blood pressure) < 140/90   Changed by:  Santi Wright MD        TAKE 1 CAPSULE (10 MG) BY MOUTH DAILY (WITH DINNER)   Quantity:  90 capsule   Refills:  3            Where to get your medicines      These medications were sent to [place] [address] [place], [place] - [address]  [address] [address]     Phone:  [phone]     atenolol-chlorthalidone 50-25 MG per tablet    ramipril 10 MG capsule                Primary Care Provider Office Phone # Fax #    Santi Wright [phone]       [address]  [facility] [address]        Equal Access to Services     ANAYA MOORE : Hadii elodia ku hadasho Soomaali, waaxda luqadaha, qaybta kaalmada adeegyada, nunu womack haymagdaleno gonzales. So [facility] [phone].    ATENCIÓN: Si habla español, tiene a garcia disposición servicios gratuitos de asistencia lingüística. Llame al [phone].    We comply with applicable federal civil rights laws and [place] laws. We do not discriminate on the basis of race, color, national origin, age, disability, sex, sexual orientation, or gender identity.            Thank you!     Thank you for choosing [facility]  for your care. Our goal is always to provide you with excellent care. Hearing back from our patients is one way we can continue to improve our services. Please take a  few minutes to complete the written survey that you may receive in the mail after your visit with us. Thank you!             Your Updated Medication List - Protect others around you: Learn how to safely use, store and throw away your medicines at www.disposemymeds.org.          This list is accurate as of 5/17/18  8:49 AM.  Always use your most recent med list.                   Brand Name Dispense Instructions for use Diagnosis    atenolol-chlorthalidone 50-25 MG per tablet    TENORETIC    90 tablet    Take 1 tablet by mouth daily    Hypertension goal BP (blood pressure) < 140/90       multivitamin, therapeutic with minerals Tabs tablet      Take 1 tablet by mouth daily        ramipril 10 MG capsule    ALTACE    90 capsule    TAKE 1 CAPSULE (10 MG) BY MOUTH DAILY (WITH DINNER)    Hypertension goal BP (blood pressure) < 140/90       sildenafil 20 MG tablet    REVATIO    30 tablet    Take 1 tablet (20 mg) by mouth daily as needed Use as needed prior to any sexual activity.    Drug-induced erectile dysfunction       VITAMIN B 12 PO      Take 250 mcg by mouth daily

## 2018-05-17 NOTE — PROGRESS NOTES
SUBJECTIVE:   Malvin Au is a 43 year old male who presents to clinic today for the following health issues:     Hypertension Follow-up      Outpatient blood pressures monitoring: No    Low Salt Diet: No    Adverse effects: none    Compliance: good    Secondary causes: none    Chronic kidney disease: no    Hyperthyroidism: no    Anxiety: no    Decongestants: no    Substance abuse: no    Diabetes: no    Ischemic heart disease: no    Stroke: YES    Hyperlipidemia: no    Complications: history of subarachnoid hemorrhage last 1/24/2017. He presented to [facility] ER due to syncope. CT of head shows extensive subarachnoid hemorrhage secondary to ruptured anterior communicating artery aneurysm, S/P coil embolization.        Problem list and histories reviewed & adjusted, as indicated.  Additional history: as documented    Patient Active Problem List   Diagnosis     Personal history of subarachnoid hemorrhage     Hypertension goal BP (blood pressure) < 130/80     CARDIOVASCULAR SCREENING; LDL GOAL LESS THAN 130     Overweight (BMI 25.0-29.9)     Right bundle branch block     Drug-induced erectile dysfunction     Psoriasis     Past Surgical History:   Procedure Laterality Date     ANESTHESIA OUT OF OR MRI N/A 1/23/2017    Procedure: ANESTHESIA OUT OF OR MRI;  Surgeon: GENERIC ANESTHESIA PROVIDER;  Location:  OR     EYE SURGERY      lasik     VASCULAR SURGERY      angiogram       Social History   Substance Use Topics     Smoking status: Never Smoker     Smokeless tobacco: Never Used     Alcohol use Yes      Comment: a couple per day     Family History   Problem Relation Age of Onset     DIABETES Mother      Hypertension Mother      CEREBROVASCULAR DISEASE Mother          No Known Allergies  Recent Labs   Lab Test  07/28/17   1018  04/03/17   1459   01/23/17   1800   A1C   --    --    --   4.6   LDL   --    --    --   68   HDL   --    --    --   43   TRIG   --    --    --   91   ALT  82*   --    --    --   "  CR  0.74  0.83   < >   --    GFRESTIMATED  >90  Non  GFR Calc    >90  Non  GFR Calc     < >   --    GFRESTBLACK  >90   GFR Calc    >90   GFR Calc     < >   --    POTASSIUM  3.5  3.2*   < >   --    TSH   --    --    --   0.61    < > = values in this interval not displayed.      BP Readings from Last 3 Encounters:   05/17/18 (!) 149/101   08/03/17 (!) 133/99   07/28/17 110/86    Wt Readings from Last 3 Encounters:   05/17/18 243 lb (110.2 kg)   08/03/17 237 lb (107.5 kg)   07/28/17 240 lb (108.9 kg)                 ROS:  CONSTITUTIONAL: NEGATIVE for fever, chills, change in weight  INTEGUMENTARY/SKIN: NEGATIVE for worrisome rashes, moles or lesions  EYES: NEGATIVE for vision changes or irritation  ENT/MOUTH: NEGATIVE for ear, mouth and throat problems  RESP: NEGATIVE for significant cough or SOB  CV: NEGATIVE for chest pain, palpitations or peripheral edema  GI: NEGATIVE for nausea, abdominal pain, heartburn, or change in bowel habits  : NEGATIVE for frequency, dysuria, or hematuria  MUSCULOSKELETAL: NEGATIVE for significant arthralgias or myalgia  NEURO: NEGATIVE for weakness, dizziness or paresthesias  ENDOCRINE: NEGATIVE for temperature intolerance, skin/hair changes  HEME: NEGATIVE for bleeding problems  PSYCHIATRIC: NEGATIVE for changes in mood or affect    OBJECTIVE:     BP (!) 149/101  Pulse 79  Temp 98.4  F (36.9  C) (Oral)  Ht 6' 0.25\" (1.835 m)  Wt 243 lb (110.2 kg)  SpO2 97%  BMI 32.73 kg/m2  Body mass index is 32.73 kg/(m^2).  GENERAL: healthy, alert and no distress  EYES: Eyes grossly normal to inspection, PERRL and conjunctivae and sclerae normal  HENT: ear canals and TM's normal, nose and mouth without ulcers or lesions  NECK: no adenopathy, no asymmetry, masses, or scars and thyroid normal to palpation  RESP: lungs clear to auscultation - no rales, rhonchi or wheezes  CV: regular rate and rhythm, normal S1 S2, no S3 or S4, no " murmur, click or rub, no peripheral edema and peripheral pulses strong  ABDOMEN: soft, nontender, no hepatosplenomegaly, no masses and bowel sounds normal  MS: no gross musculoskeletal defects noted, no edema  SKIN: no suspicious lesions or rashes  NEURO: Normal strength and tone, mentation intact and speech normal  PSYCH: mentation appears normal, affect normal/bright    Diagnostic Test Results:  none     ASSESSMENT/PLAN:     (I10) Hypertension goal BP (blood pressure) < 140/90  (primary encounter diagnosis)  Comment:   Plan: atenolol-chlorthalidone (TENORETIC) 50-25 MG         per tablet, ramipril (ALTACE) 10 MG capsule,         Albumin Random Urine Quantitative with Creat         Ratio, Basic metabolic panel  (Ca, Cl, CO2,         Creat, Gluc, K, Na, BUN), CANCELED: Basic         metabolic panel  (Ca, Cl, CO2, Creat, Gluc, K,         Na, BUN)            Repeat BP with ancillary nurse at [facility] within one month.      Santi Wright MD  [facility]

## 2018-05-17 NOTE — LETTER
[address] [address]  [phone]        May 22, 2019    Malvin Au  [address] [place] [id number]              Dear Malvin Au    This is to remind you that your Non-fasting lab is due.    You may call our office at [phone] to schedule an appointment.    Please disregard this notice if you have already had your labs drawn or made an appointment.        Sincerely,        Santi Wright MD

## 2018-06-04 ENCOUNTER — NURSE TRIAGE (OUTPATIENT)
Dept: NURSING | Facility: CLINIC | Age: 43
End: 2018-06-04

## 2018-06-25 DIAGNOSIS — I10 HYPERTENSION GOAL BP (BLOOD PRESSURE) < 140/90: ICD-10-CM

## 2018-06-26 RX ORDER — RAMIPRIL 10 MG/1
CAPSULE ORAL
Qty: 90 CAPSULE | Refills: 0
Start: 2018-06-26

## 2018-06-26 NOTE — TELEPHONE ENCOUNTER
90 day supply with 3 refills sent on 5/17/2018. Should have refills on file at pharmacy. Too soon to refill.    Augustina Schwab RN, BSN

## 2018-11-12 DIAGNOSIS — L40.0 PSORIASIS VULGARIS: Primary | ICD-10-CM

## 2018-11-12 PROCEDURE — 86480 TB TEST CELL IMMUN MEASURE: CPT | Performed by: SPECIALIST

## 2018-11-12 PROCEDURE — 36415 COLL VENOUS BLD VENIPUNCTURE: CPT | Performed by: SPECIALIST

## 2018-11-14 LAB
GAMMA INTERFERON BACKGROUND BLD IA-ACNC: 0.03 IU/ML
M TB IFN-G BLD-IMP: NEGATIVE
M TB IFN-G CD4+ BCKGRND COR BLD-ACNC: >10 IU/ML
MITOGEN IGNF BCKGRD COR BLD-ACNC: 0 IU/ML
MITOGEN IGNF BCKGRD COR BLD-ACNC: 0 IU/ML

## 2018-11-16 NOTE — PROGRESS NOTES
[facility]    Hospitalist Progress Note    Date of Service (when I saw the patient): 01/27/2017    Assessment and Plan  Malvin Au is a 41 year old male who was admitted on 1/23/2017.    1. Subarachnoid hemmorhage: donovan grade II, Mcguire-Salmeron grade II SAH secondary to ruptured anterior commincating artery aneurysm  --s/p coiling t.2 mm AcomA aneuyrsm on 1/23/17  --Doing well overall.   PT, OT, SLP consulted. Transcranial dopplers daily to monitor for vasospasm.     2. Hypertensive emergency:   ---patient has been weaned of nicardipine gtts. Now on nimodipine.. Neurocritical care recommending initial Systolic goal of <180.    3. Hyponatremia: can be precursor to vasospasm, thus, Hypertonic saline being given per Neurocritical care. Checking Na every 6 hours.     4. Troponin elevation: likley secondary to #1 and #2. TTE shows mild LVH, negative bubble study.     5. Convulsions Secondary to #1: on keppra.     6. Nausea and vomiting secondary to #1.   Continue IV ativan and zofran.     6.  Skin lesions: Concern for celiac disease: Wife is concerned that patient has celiac disease as he has other family members with this condition    Transglutaminase IgG and IgA are negative. Would defer further workup to outpatient setting if symptoms of diarhea, malabsorption present. Given strong family history, have advised family to consider further workup via GI if indicated.     DVT Prophylaxis: Pneumatic Compression Devices  Code Status: Full Code    Disposition: Expected discharge in 4-6 days     Demetrius Farah MD  [phone] (P)  Text Page (7 am to 6 pm)    Interval History      Wife at bedside.    Appetite a bit improved. Ate a banana and yogurt. Minimal vomiting last PM.       -Data reviewed today: I reviewed all new labs and imaging results over the last 24 hours    dPhysical Exam  Temp: 98.2  F (36.8  C) Temp src: Oral BP: 144/90 mmHg   Heart Rate: 69 Resp: 12 SpO2: 95 % O2 Device: None (Room air)     Filed Vitals:    01/23/17 1133   Weight: 104.327 kg (230 lb)     Vital Signs with Ranges  Temp:  [98.2  F (36.8  C)-99.5  F (37.5  C)] 98.2  F (36.8  C)  Heart Rate:  [66-95] 69  Resp:  [8-18] 12  BP: (133-164)/() 144/90 mmHg  SpO2:  [92 %-98 %] 95 %  I/O last 3 completed shifts:  In: 2928.33 [I.V.:2928.33]  Out: 3450 [Urine:3350; Emesis/NG output:100]    Constitutional: No acute distress  HEENT: no facial asymmetry. EOMI  Respiratory: Clear to auscultation bilaterally, no crackles  Cardiovascular: Regular rate and rhythm, S1, S2, no murmurs  GI: Abdomen soft, nontender, nondistended, bowel sounds are heard  Skin/Integumen: No jaundice, no suspicious rash  NEURO: CN  II-XII grossly intact.      Medications    IV infusion builder WITH LARGE additive list 75 mL/hr at 01/27/17 1524     niCARdipine 40 mg in 200 mL 0.9% NaCl Stopped (01/24/17 1021)     - MEDICATION INSTRUCTIONS -         cyanocobalamin  1,000 mcg Sublingual Daily     levETIRAcetam  500 mg Oral BID     niMODipine  60 mg Oral Q4H     ondansetron  4 mg Intravenous Q6H ZACKERY     sodium chloride (PF)  3 mL Intracatheter Q8H       Data    Recent Labs  Lab 01/27/17  1803 01/27/17  1220 01/27/17  0620 01/27/17  0400  01/26/17  1213  01/26/17  0500 01/24/17  2325 01/24/17  1724 01/24/17  1135 01/24/17  0455  01/23/17  0915   WBC  --   --   --  12.8*  --  13.3*  --   --   --   --   --  11.6*  --  6.3   HGB  --   --   --  13.7  --  13.7  --   --   --   --   --  13.0*  --  14.4   MCV  --   --   --  87  --  88  --   --   --   --   --  90  --  89   PLT  --   --   --  222  --  207  --   --   --   --   --  196  --  180   * 128* 128* 128*  < > 130*  < > 127*  --   --   --  137  --  140   POTASSIUM  --   --   --   --   --   --   --  3.6  --   --   --  3.6  --  3.5   CHLORIDE  --   --   --   --   --   --   --  95  --   --   --  102  --  105   CO2  --   --   --   --   --   --   --  25  --   --   --  26  --  29   BUN  --   --   --   --   --   --   --  10  --   --  urinary symptoms   --  6*  --  12   CR  --   --   --   --   --   --   --  0.58*  --   --   --  0.64*  --  0.68   ANIONGAP  --   --   --   --   --   --   --  7  --   --   --  9  --  6   RADHA  --   --   --   --   --   --   --  8.4*  --   --   --  8.0*  --  8.6   GLC  --   --   --   --   --   --   --  115*  --   --   --  132*  --  124*   TROPI  --   --   --   --   --   --   --   --  0.084* 0.062* 0.095* 0.095*  < >  --    < > = values in this interval not displayed.    Recent Results (from the past 24 hour(s))   CT Head w/o Contrast    Narrative    CT OF THE HEAD WITHOUT CONTRAST  1/27/2017 6:04 AM     COMPARISON: Head CT 1/24/2017.    HISTORY:  Hydrocephalus.    TECHNIQUE: Axial CT images of the head from the skull base to the  vertex were acquired without IV contrast.    FINDINGS: Endovascular occlusion coils in the anterior communicating  artery again noted. There has been an interval decrease in conspicuity  of the subarachnoid hemorrhage in the anterior interhemispheric  fissure. Tiny foci of layering hemorrhage in the occipital horns of  the lateral ventricles on both sides again noted without change.  Maximum transverse diameter of the third ventricle has decreased from  12 mm previously to 11 mm currently, suggesting improving  hydrocephalus.     There is no midline shift. There is no evidence for new intracranial  hemorrhage. Gray-white differentiation of the brain remains normal  with no evidence for ischemic infarct.    There is no sinusitis or mastoiditis.      Impression    IMPRESSION:  1. Continued decrease in subarachnoid hemorrhage in the anterior  aspect of the interhemispheric fissure.  2. Slight interval decrease in maximum transverse diameter of the  third ventricle suggesting improving hydrocephalus.  3. No evidence for new or increasing hemorrhage.  4. No evidence for ischemic infarct.    Radiation dose for this scan was reduced using automated exposure  control, adjustment of the mA and/or kV according to patient  size, or  iterative reconstruction technique.    HANS MALDONADO MD   CT Head w Contrast    Narrative    CT BRAIN PERFUSION  1/27/2017 6:05 AM    COMPARISON: CT perfusion 1/23/2017.    HISTORY: CT perfusion.    TECHNIQUE: Time sequential axial CT images of the head were acquired  during the administration of intravenous contrast (50 mL Isovue-370).  CTA images of the [place] as well as color perfusion maps of  the brain were created from this time sequential axial source data.    FINDINGS: There are no focal or regional perfusion defects in the  brain.      Impression    IMPRESSION: Normal CT perfusion of the brain.    Radiation dose for this scan was reduced using automated exposure  control, adjustment of the mA and/or kV according to patient size, or  iterative reconstruction technique.    HANS MALDONADO MD   US Transcranial Doppler Complete Port    Narrative    US TRANSCRANIAL DOPPLER COMPLETE PORTABLE 1/27/2017 4:39 PM     HISTORY: Subarachnoid hemorrhage. Aneurysm coiling. Assess for  vasospasm.    TECHNIQUE: Transcranial Doppler ultrasound.     COMPARISON: 6/26/2017.    FINDINGS:    Mean velocities in cm/s are as follows.    Right M1: 63; pulsatility index 1.09; Lindegaard index 1.53.    Right A1: 32.    Right PCA: 27.    Right internal carotid artery: 41.    Left MCA: 66; pulsatility index 1.11; Lindegaard index 1.7.    Left A1: 40.    Left PCA: 45.    Left internal carotid artery: 39.    Basilar artery 29.      Impression    IMPRESSION: No evidence for vasospasm.    PROSPER ENGLISH MD

## 2019-04-24 DIAGNOSIS — I10 HYPERTENSION GOAL BP (BLOOD PRESSURE) < 140/90: ICD-10-CM

## 2019-04-24 NOTE — TELEPHONE ENCOUNTER
"Requested Prescriptions   Pending Prescriptions Disp Refills     atenolol-chlorthalidone (TENORETIC) 50-25 MG tablet [Pharmacy Med Name: Atenolol-Chlorthalidone Oral Tablet 50-25 MG]      Last Written Prescription Date:  5/17/18  Last Fill Quantity: 90,  # refills: 3   Last Office Visit with [facility] or [facility] prescribing provider:  5/17/18   Future Office Visit:      90 tablet 0     Sig: TAKE 1 TABLET BY MOUTH ONCE DAILY       Beta-Blockers Protocol Failed - 4/24/2019 10:48 AM        Failed - Blood pressure under 140/90 in past 12 months     BP Readings from Last 3 Encounters:   05/17/18 (!) 149/101   08/03/17 (!) 133/99   07/28/17 110/86                 Passed - Patient is age 6 or older        Passed - Recent (12 mo) or future (30 days) visit within the authorizing provider's specialty     Patient had office visit in the last 12 months or has a visit in the next 30 days with authorizing provider or within the authorizing provider's specialty.  See \"Patient Info\" tab in inbasket, or \"Choose Columns\" in Meds & Orders section of the refill encounter.              Passed - Medication is active on med list              David Faarax  [facility]  "

## 2019-04-25 RX ORDER — ATENOLOL AND CHLORTHALIDONE TABLET 50; 25 MG/1; MG/1
TABLET ORAL
Qty: 30 TABLET | Refills: 0 | Status: SHIPPED | OUTPATIENT
Start: 2019-04-25 | End: 2019-06-04

## 2019-04-25 NOTE — TELEPHONE ENCOUNTER
Routing refill request to provider for review/approval because:  BP is out of range.        Augustina Schwab RN, BSN

## 2019-06-04 ENCOUNTER — MYC MEDICAL ADVICE (OUTPATIENT)
Dept: FAMILY MEDICINE | Facility: CLINIC | Age: 44
End: 2019-06-04

## 2019-07-11 ENCOUNTER — OFFICE VISIT (OUTPATIENT)
Dept: FAMILY MEDICINE | Facility: CLINIC | Age: 44
End: 2019-07-11
Payer: COMMERCIAL

## 2019-07-11 VITALS
HEIGHT: 72 IN | SYSTOLIC BLOOD PRESSURE: 125 MMHG | OXYGEN SATURATION: 97 % | HEART RATE: 76 BPM | DIASTOLIC BLOOD PRESSURE: 82 MMHG | WEIGHT: 238.8 LBS | BODY MASS INDEX: 32.34 KG/M2 | RESPIRATION RATE: 18 BRPM | TEMPERATURE: 98.2 F

## 2019-07-11 DIAGNOSIS — E66.811 CLASS 1 OBESITY DUE TO EXCESS CALORIES WITHOUT SERIOUS COMORBIDITY WITH BODY MASS INDEX (BMI) OF 32.0 TO 32.9 IN ADULT: ICD-10-CM

## 2019-07-11 DIAGNOSIS — E66.09 CLASS 1 OBESITY DUE TO EXCESS CALORIES WITHOUT SERIOUS COMORBIDITY WITH BODY MASS INDEX (BMI) OF 32.0 TO 32.9 IN ADULT: ICD-10-CM

## 2019-07-11 DIAGNOSIS — I10 HYPERTENSION GOAL BP (BLOOD PRESSURE) < 140/90: ICD-10-CM

## 2019-07-11 LAB
ANION GAP SERPL CALCULATED.3IONS-SCNC: 6 MMOL/L (ref 3–14)
BUN SERPL-MCNC: 9 MG/DL (ref 7–30)
CALCIUM SERPL-MCNC: 9.3 MG/DL (ref 8.5–10.1)
CHLORIDE SERPL-SCNC: 100 MMOL/L (ref 94–109)
CO2 SERPL-SCNC: 31 MMOL/L (ref 20–32)
CREAT SERPL-MCNC: 0.82 MG/DL (ref 0.66–1.25)
CREAT UR-MCNC: 133 MG/DL
GFR SERPL CREATININE-BSD FRML MDRD: >90 ML/MIN/{1.73_M2}
GLUCOSE SERPL-MCNC: 107 MG/DL (ref 70–99)
MICROALBUMIN UR-MCNC: <5 MG/L
MICROALBUMIN/CREAT UR: NORMAL MG/G CR (ref 0–17)
POTASSIUM SERPL-SCNC: 3.7 MMOL/L (ref 3.4–5.3)
SODIUM SERPL-SCNC: 137 MMOL/L (ref 133–144)

## 2019-07-11 PROCEDURE — 82043 UR ALBUMIN QUANTITATIVE: CPT | Performed by: INTERNAL MEDICINE

## 2019-07-11 PROCEDURE — 99213 OFFICE O/P EST LOW 20 MIN: CPT | Performed by: INTERNAL MEDICINE

## 2019-07-11 PROCEDURE — 36415 COLL VENOUS BLD VENIPUNCTURE: CPT | Performed by: INTERNAL MEDICINE

## 2019-07-11 PROCEDURE — 80048 BASIC METABOLIC PNL TOTAL CA: CPT | Performed by: INTERNAL MEDICINE

## 2019-07-11 RX ORDER — RAMIPRIL 10 MG/1
CAPSULE ORAL
Qty: 90 CAPSULE | Refills: 3 | Status: SHIPPED | OUTPATIENT
Start: 2019-07-11 | End: 2020-02-03

## 2019-07-11 RX ORDER — ATENOLOL AND CHLORTHALIDONE TABLET 50; 25 MG/1; MG/1
1 TABLET ORAL DAILY
Qty: 90 TABLET | Refills: 3 | Status: SHIPPED | OUTPATIENT
Start: 2019-07-11 | End: 2020-02-03

## 2019-07-11 ASSESSMENT — PAIN SCALES - GENERAL: PAINLEVEL: NO PAIN (0)

## 2019-07-11 ASSESSMENT — MIFFLIN-ST. JEOR: SCORE: 2001.19

## 2019-07-11 NOTE — PROGRESS NOTES
Subjective     Malvin Au is a 44 year old male who presents to clinic today for the following health issues:    HPI   Hypertension Follow-up      Do you check your blood pressure regularly outside of the clinic? Yes     Are you following a low salt diet? Yes    Are your blood pressures ever more than 140 on the top number (systolic) OR more   than 90 on the bottom number (diastolic), for example 140/90? No    Amount of exercise or physical activity: None    Problems taking medications regularly: No    Medication side effects: none    Diet: regular (no restrictions)    Compliance: excellent    Secondary causes: none    Chronic kidney disease: no    Hyperthyroidism: no    Anxiety: no    Decongestants: no    Substance abuse: no    Diabetes: no    Ischemic heart disease: no    Stroke: no    Hyperlipidemia: no           Patient Active Problem List   Diagnosis     Personal history of subarachnoid hemorrhage     Hypertension goal BP (blood pressure) < 130/80     CARDIOVASCULAR SCREENING; LDL GOAL LESS THAN 130     Overweight (BMI 25.0-29.9)     Right bundle branch block     Drug-induced erectile dysfunction     Psoriasis     Past Surgical History:   Procedure Laterality Date     ANESTHESIA OUT OF OR MRI N/A 1/23/2017    Procedure: ANESTHESIA OUT OF OR MRI;  Surgeon: GENERIC ANESTHESIA PROVIDER;  Location:  OR     EYE SURGERY      lasik     VASCULAR SURGERY      angiogram       Social History     Tobacco Use     Smoking status: Never Smoker     Smokeless tobacco: Never Used   Substance Use Topics     Alcohol use: Yes     Comment: a couple per day     Family History   Problem Relation Age of Onset     Diabetes Mother      Hypertension Mother      Cerebrovascular Disease Mother          Current Outpatient Medications   Medication Sig Dispense Refill     atenolol-chlorthalidone (TENORETIC) 50-25 MG tablet Take 1 tablet by mouth daily 90 tablet 3     Cyanocobalamin (VITAMIN B 12 PO) Take 250 mcg by mouth daily        multivitamin, therapeutic with minerals (THERA-VIT-M) TABS tablet Take 1 tablet by mouth daily       ramipril (ALTACE) 10 MG capsule TAKE 1 CAPSULE (10 MG) BY MOUTH DAILY (WITH DINNER) 90 capsule 3     risankizumab-rzaa 150 Dose (SKYRIZI 150 DOSE) 75 MG/0.83ML subcutaneous        sildenafil (REVATIO/VIAGRA) 20 MG tablet Take 1 tablet (20 mg) by mouth daily as needed Use as needed prior to any sexual activity. 30 tablet 5     No Known Allergies  Recent Labs   Lab Test 07/11/19  0841 07/28/17  1018  01/23/17  1800   A1C  --   --   --  4.6   LDL  --   --   --  68   HDL  --   --   --  43   TRIG  --   --   --  91   ALT  --  82*  --   --    CR 0.82 0.74   < >  --    GFRESTIMATED >90 >90  Non African American GFR Calc     < >  --    GFRESTBLACK >90 >90  African American GFR Calc     < >  --    POTASSIUM 3.7 3.5   < >  --    TSH  --   --   --  0.61    < > = values in this interval not displayed.      BP Readings from Last 3 Encounters:   07/11/19 125/82   05/17/18 (!) 149/101   08/03/17 (!) 133/99    Wt Readings from Last 3 Encounters:   07/11/19 108.3 kg (238 lb 12.8 oz)   05/17/18 110.2 kg (243 lb)   08/03/17 107.5 kg (237 lb)                      Reviewed and updated as needed this visit by Provider         Review of Systems   ROS COMP: CONSTITUTIONAL: NEGATIVE for fever, chills, change in weight  INTEGUMENTARY/SKIN: NEGATIVE for worrisome rashes, moles or lesions  EYES: NEGATIVE for vision changes or irritation  ENT/MOUTH: NEGATIVE for ear, mouth and throat problems  RESP: NEGATIVE for significant cough or SOB  CV: NEGATIVE for chest pain, palpitations or peripheral edema  GI: NEGATIVE for nausea, abdominal pain, heartburn, or change in bowel habits  : NEGATIVE for frequency, dysuria, or hematuria  MUSCULOSKELETAL: NEGATIVE for significant arthralgias or myalgia  NEURO: NEGATIVE for weakness, dizziness or paresthesias  ENDOCRINE: NEGATIVE for temperature intolerance, skin/hair changes  HEME: NEGATIVE for bleeding  problems  PSYCHIATRIC: NEGATIVE for changes in mood or affect      Objective    /82   Pulse 76   Temp 98.2  F (36.8  C) (Oral)   Resp 18   Ht 1.829 m (6')   Wt 108.3 kg (238 lb 12.8 oz)   SpO2 97%   BMI 32.39 kg/m     Physical Exam   GENERAL: healthy, alert and no distress  EYES: Eyes grossly normal to inspection, PERRL and conjunctivae and sclerae normal  HENT: ear canals and TM's normal, nose and mouth without ulcers or lesions  NECK: no adenopathy, no asymmetry, masses, or scars and thyroid normal to palpation  RESP: lungs clear to auscultation - no rales, rhonchi or wheezes  CV: regular rate and rhythm, normal S1 S2, no S3 or S4, no murmur, click or rub, no peripheral edema and peripheral pulses strong  ABDOMEN: soft, nontender, no hepatosplenomegaly, no masses and bowel sounds normal  MS: no gross musculoskeletal defects noted, no edema  SKIN: no suspicious lesions or rashes  NEURO: Normal strength and tone, mentation intact and speech normal  PSYCH: mentation appears normal, affect normal/bright    Diagnostic Test Results:  Results for orders placed or performed in visit on 07/11/19   Basic metabolic panel  (Ca, Cl, CO2, Creat, Gluc, K, Na, BUN)   Result Value Ref Range    Sodium 137 133 - 144 mmol/L    Potassium 3.7 3.4 - 5.3 mmol/L    Chloride 100 94 - 109 mmol/L    Carbon Dioxide 31 20 - 32 mmol/L    Anion Gap 6 3 - 14 mmol/L    Glucose 107 (H) 70 - 99 mg/dL    Urea Nitrogen 9 7 - 30 mg/dL    Creatinine 0.82 0.66 - 1.25 mg/dL    GFR Estimate >90 >60 mL/min/[1.73_m2]    GFR Estimate If Black >90 >60 mL/min/[1.73_m2]    Calcium 9.3 8.5 - 10.1 mg/dL   Albumin Random Urine Quantitative with Creat Ratio   Result Value Ref Range    Creatinine Urine 133 mg/dL    Albumin Urine mg/L <5 mg/L    Albumin Urine mg/g Cr Unable to calculate due to low value 0 - 17 mg/g Cr           Assessment & Plan     1. Hypertension goal BP (blood pressure) < 140/90  Comment: Well-controlled without side effects.  Predisposing factor to history of subarachnoid hemorrhage.  - atenolol-chlorthalidone (TENORETIC) 50-25 MG tablet; Take 1 tablet by mouth daily  Dispense: 90 tablet; Refill: 3  - ramipril (ALTACE) 10 MG capsule; TAKE 1 CAPSULE (10 MG) BY MOUTH DAILY (WITH DINNER)  Dispense: 90 capsule; Refill: 3  - Basic metabolic panel  (Ca, Cl, CO2, Creat, Gluc, K, Na, BUN)  - Albumin Random Urine Quantitative with Creat Ratio     BMI:   Estimated body mass index is 32.39 kg/m  as calculated from the following:    Height as of this encounter: 1.829 m (6').    Weight as of this encounter: 108.3 kg (238 lb 12.8 oz).   Weight management plan: diet and exercise.        FUTURE APPOINTMENTS:       - Follow-up visit in one year.    Santi Wright MD  [facility]

## 2019-07-17 PROBLEM — E66.811 CLASS 1 OBESITY DUE TO EXCESS CALORIES WITH BODY MASS INDEX (BMI) OF 32.0 TO 32.9 IN ADULT: Status: ACTIVE | Noted: 2019-07-17

## 2019-07-17 PROBLEM — E66.09 CLASS 1 OBESITY DUE TO EXCESS CALORIES WITH BODY MASS INDEX (BMI) OF 32.0 TO 32.9 IN ADULT: Status: ACTIVE | Noted: 2019-07-17

## 2019-10-04 ENCOUNTER — HEALTH MAINTENANCE LETTER (OUTPATIENT)
Age: 44
End: 2019-10-04

## 2020-02-03 ENCOUNTER — MYC REFILL (OUTPATIENT)
Dept: FAMILY MEDICINE | Facility: CLINIC | Age: 45
End: 2020-02-03

## 2020-02-03 DIAGNOSIS — I10 HYPERTENSION GOAL BP (BLOOD PRESSURE) < 140/90: ICD-10-CM

## 2020-02-04 RX ORDER — RAMIPRIL 10 MG/1
CAPSULE ORAL
Qty: 90 CAPSULE | Refills: 1 | Status: SHIPPED | OUTPATIENT
Start: 2020-02-04 | End: 2020-07-13

## 2020-02-04 RX ORDER — ATENOLOL AND CHLORTHALIDONE TABLET 50; 25 MG/1; MG/1
1 TABLET ORAL DAILY
Qty: 90 TABLET | Refills: 1 | Status: SHIPPED | OUTPATIENT
Start: 2020-02-04 | End: 2020-07-13

## 2020-02-04 NOTE — TELEPHONE ENCOUNTER
Remaining refill sent to patient's requested pharmacy.   Rajni Justice RN  [facility]

## 2020-06-23 DIAGNOSIS — I10 HYPERTENSION GOAL BP (BLOOD PRESSURE) < 140/90: ICD-10-CM

## 2020-06-25 RX ORDER — RAMIPRIL 10 MG/1
CAPSULE ORAL
Qty: 90 CAPSULE | Refills: 1
Start: 2020-06-25

## 2020-06-25 RX ORDER — ATENOLOL AND CHLORTHALIDONE TABLET 50; 25 MG/1; MG/1
1 TABLET ORAL DAILY
Qty: 90 TABLET | Refills: 1
Start: 2020-06-25

## 2020-06-25 NOTE — TELEPHONE ENCOUNTER
90 day supply with 2 refills sent on 2/4/20. Should have enough until August 2020. Too soon to refill.       Augustina Schwab RN, BSN, PHN

## 2020-11-14 ENCOUNTER — HEALTH MAINTENANCE LETTER (OUTPATIENT)
Age: 45
End: 2020-11-14

## 2020-11-23 NOTE — TELEPHONE ENCOUNTER
From [id number]= Wife called with Pt .  Suspecting blood clot in left foot/ ankle  for the last 3 days . Currently : swollen and mildly red and hard lump and mild  pain with  some movement  And left calf 1/2 inch larger than R .  Triage for ankle pain - adult with disposition of see provider in 4 hours and sent to .  .Any [facility] nurse advisors.      Reason for Disposition    [1] Calf swelling AND [2] only 1 side    Additional Information    Negative: Entire foot is cool or blue in comparison to other side    Negative: [1] Swollen joint AND [2] fever    Negative: [1] Red area or streak AND [2] fever    Negative: Patient sounds very sick or weak to the triager    Negative: [1] SEVERE pain (e.g., excruciating, unable to walk) AND [2] not improved after 2 hours of pain medicine    Negative: [1] Thigh or calf pain AND [2] only 1 side AND [3] present > 1 hour    Negative: [1] Looks infected (spreading redness, pus) AND [2] large red area (> 2 in. or 5 cm)    Protocols used: ANKLE PAIN-ADULT-      
[Follow-up Visit ___] : a follow-up visit  for [unfilled]
[Follow-up Visit ___] : a follow-up visit  for [unfilled]

## 2020-11-27 ENCOUNTER — DOCUMENTATION ONLY (OUTPATIENT)
Dept: LAB | Facility: CLINIC | Age: 45
End: 2020-11-27

## 2020-11-27 DIAGNOSIS — Z13.6 CARDIOVASCULAR SCREENING; LDL GOAL LESS THAN 130: ICD-10-CM

## 2020-11-27 DIAGNOSIS — I10 HYPERTENSION GOAL BP (BLOOD PRESSURE) < 130/80: Primary | ICD-10-CM

## 2020-12-03 ENCOUNTER — VIRTUAL VISIT (OUTPATIENT)
Dept: FAMILY MEDICINE | Facility: CLINIC | Age: 45
End: 2020-12-03
Payer: COMMERCIAL

## 2020-12-03 DIAGNOSIS — I10 HYPERTENSION GOAL BP (BLOOD PRESSURE) < 130/80: ICD-10-CM

## 2020-12-03 DIAGNOSIS — E66.09 CLASS 1 OBESITY DUE TO EXCESS CALORIES WITHOUT SERIOUS COMORBIDITY WITH BODY MASS INDEX (BMI) OF 32.0 TO 32.9 IN ADULT: ICD-10-CM

## 2020-12-03 DIAGNOSIS — L40.0 PSORIASIS VULGARIS: Primary | ICD-10-CM

## 2020-12-03 DIAGNOSIS — E66.811 CLASS 1 OBESITY DUE TO EXCESS CALORIES WITHOUT SERIOUS COMORBIDITY WITH BODY MASS INDEX (BMI) OF 32.0 TO 32.9 IN ADULT: ICD-10-CM

## 2020-12-03 DIAGNOSIS — Z00.00 ENCOUNTER FOR ANNUAL HEALTH EXAMINATION: Primary | ICD-10-CM

## 2020-12-03 DIAGNOSIS — N52.2 DRUG-INDUCED ERECTILE DYSFUNCTION: ICD-10-CM

## 2020-12-03 PROCEDURE — 86481 TB AG RESPONSE T-CELL SUSP: CPT | Performed by: SPECIALIST

## 2020-12-03 PROCEDURE — 99396 PREV VISIT EST AGE 40-64: CPT | Mod: 95 | Performed by: INTERNAL MEDICINE

## 2020-12-03 NOTE — PROGRESS NOTES
"Malvin Au is a 45 year old male who is being evaluated via a billable video visit.      The patient has been notified of following:     \"This video visit will be conducted via a call between you and your physician/provider. We have found that certain health care needs can be provided without the need for a physical exam.  This service lets us provide the care you need with a video visit.  If a prescription is necessary we can send it directly to your pharmacy.  If lab work is needed we can place an order for that and you can then stop by our lab to have the test done at a later time.    Video visits are billed at different rates depending on your insurance coverage. During this emergency period, for some insurers they may be billed the same as an in-person visit.  Please reach out to your insurance provider with any questions.    If during the course of the call the physician/provider feels a video visit is not appropriate, you will not be charged for this service.\"    Patient has given verbal consent for Video visit?  Yes    What phone number would you like to be contacted at? [phone]    How would you like to obtain your AVS? Mail a copy      ANNUAL PREVENTIVE VISIT.    CC: Malvin Au is an 45 year old male who presents for preventative health visit.     Patient has been advised of split billing requirements and indicates understanding: Yes    HPI     Hypertension Follow-up      Outpatient blood pressures monitoring: no    Low Salt Diet: yes    Adverse effects: none from Atenolol-Chlorthalidone and Ramipril    Compliance: excellent    Secondary causes: none    Chronic kidney disease: no    Hyperthyroidism: no    Anxiety: no    Decongestants: no    Substance abuse: no    Diabetes: no    Ischemic heart disease: no    Stroke: yes. History of cerebral aneurysm last 2017.    Hyperlipidemia: No         Today's PHQ-2 Score:   PHQ-2 ( 1999 [employer]) 7/11/2019   Q1: Little interest or pleasure in doing " things 0   Q2: Feeling down, depressed or hopeless 0   PHQ-2 Score 0   Q1: Little interest or pleasure in doing things -   Q2: Feeling down, depressed or hopeless -   PHQ-2 Score -       Abuse: Current or Past(Physical, Sexual or Emotional)- No  Do you feel safe in your environment? Yes        Social History     Tobacco Use     Smoking status: Never Smoker     Smokeless tobacco: Never Used   Substance Use Topics     Alcohol use: Yes     Comment: a couple per day         Alcohol Use 5/15/2018   Prescreen: >3 drinks/day or >7 drinks/week? Yes   AUDIT SCORE  7     AUDIT - Alcohol Use Disorders Identification Test - Reproduced from the World Health Organization Audit 2001 (Second Edition) 5/15/2018   1.  How often do you have a drink containing alcohol? 4 or more times a week   2.  How many drinks containing alcohol do you have on a typical day when you are drinking? 3 or 4   3.  How often do you have five or more drinks on one occasion? Monthly   4.  How often during the last year have you found that you were not able to stop drinking once you had started? Never   5.  How often during the last year have you failed to do what was normally expected of you because of drinking? Never   6.  How often during the last year have you needed a first drink in the morning to get yourself going after a heavy drinking session? Never   7.  How often during the last year have you had a feeling of guilt or remorse after drinking? Never   8.  How often during the last year have you been unable to remember what happened the night before because of your drinking? Never   9.  Have you or someone else been injured because of your drinking? No   10. Has a relative, friend, doctor or other health care worker been concerned about your drinking or suggested you cut down? No   TOTAL SCORE 7       Last PSA: No results found for: PSA    Reviewed orders with patient. Reviewed health maintenance and updated orders accordingly - No  Labs reviewed in  EPIC  BP Readings from Last 3 Encounters:   07/11/19 125/82   05/17/18 (!) 149/101   08/03/17 (!) 133/99    Wt Readings from Last 3 Encounters:   07/11/19 108.3 kg (238 lb 12.8 oz)   05/17/18 110.2 kg (243 lb)   08/03/17 107.5 kg (237 lb)                  Patient Active Problem List   Diagnosis     Personal history of subarachnoid hemorrhage     Hypertension goal BP (blood pressure) < 130/80     CARDIOVASCULAR SCREENING; LDL GOAL LESS THAN 130     Right bundle branch block     Drug-induced erectile dysfunction     Psoriasis     Class 1 obesity due to excess calories with body mass index (BMI) of 32.0 to 32.9 in adult     Past Surgical History:   Procedure Laterality Date     ANESTHESIA OUT OF OR MRI N/A 1/23/2017    Procedure: ANESTHESIA OUT OF OR MRI;  Surgeon: GENERIC ANESTHESIA PROVIDER;  Location:  OR     EYE SURGERY      lasik     VASCULAR SURGERY      angiogram       Social History     Tobacco Use     Smoking status: Never Smoker     Smokeless tobacco: Never Used   Substance Use Topics     Alcohol use: Yes     Comment: a couple per day     Family History   Problem Relation Age of Onset     Diabetes Mother      Hypertension Mother      Cerebrovascular Disease Mother          Current Outpatient Medications   Medication Sig Dispense Refill     atenolol-chlorthalidone (TENORETIC) 50-25 MG tablet TAKE 1 TABLET BY MOUTH  DAILY 90 tablet 3     Cyanocobalamin (VITAMIN B 12 PO) Take 250 mcg by mouth daily       multivitamin, therapeutic with minerals (THERA-VIT-M) TABS tablet Take 1 tablet by mouth daily       ramipril (ALTACE) 10 MG capsule TAKE 1 CAPSULE BY MOUTH  DAILY WITH DINNER 90 capsule 3     risankizumab-rzaa 150 Dose (SKYRIZI 150 DOSE) 75 MG/0.83ML subcutaneous        sildenafil (REVATIO/VIAGRA) 20 MG tablet Take 1 tablet (20 mg) by mouth daily as needed Use as needed prior to any sexual activity. 30 tablet 5     No Known Allergies  Recent Labs   Lab Test 07/11/19  0841 07/28/17  1018 01/23/17  1800  01/23/17  1800   A1C  --   --   --  4.6   LDL  --   --   --  68   HDL  --   --   --  43   TRIG  --   --   --  91   ALT  --  82*  --   --    CR 0.82 0.74   < >  --    GFRESTIMATED >90 >90  Non African American GFR Calc     < >  --    GFRESTBLACK >90 >90  African American GFR Calc     < >  --    POTASSIUM 3.7 3.5   < >  --    TSH  --   --   --  0.61    < > = values in this interval not displayed.        Reviewed and updated as needed this visit by clinical staff                 Reviewed and updated as needed this visit by Provider                    Review of Systems  CONSTITUTIONAL: NEGATIVE for fever, chills, change in weight  INTEGUMENTARY/SKIN: NEGATIVE for worrisome rashes, moles or lesions  EYES: NEGATIVE for vision changes or irritation  ENT: NEGATIVE for ear, mouth and throat problems  RESP: NEGATIVE for significant cough or SOB  CV: NEGATIVE for chest pain, palpitations or peripheral edema  GI: NEGATIVE for nausea, abdominal pain, heartburn, or change in bowel habits   male: POSITIVE for nocturia.  MUSCULOSKELETAL: NEGATIVE for significant arthralgias or myalgia  NEURO: NEGATIVE for weakness, dizziness or paresthesias  ENDOCRINE: NEGATIVE for temperature intolerance, skin/hair changes  HEME/ALLERGY/IMMUNE: NEGATIVE for bleeding problems  PSYCHIATRIC: NEGATIVE for changes in mood or affect    OBJECTIVE:   There were no vitals taken due to nature of visit.  Physical Exam  GENERAL: healthy, alert and no distress  EYES: Eyes grossly normal to inspection and EOMI  HENT: normal cephalic/atraumatic  RESP: No lung retraction and symmetrical chest expansion.  NEURO: Normal strength and tone, mentation intact and speech normal  PSYCH: mentation appears normal, affect normal/bright    Diagnostic Test Results:  Pending.    ASSESSMENT/PLAN:   1. Encounter for annual health examination  No family history of premature atherosclerotic heart disease no early-onset colon cancer.      Patient has been advised of split billing  requirements and indicates understanding: Yes  COUNSELING:   Special attention given to:        Regular exercise       Healthy diet/nutrition       The ASCVD Risk score (Penuelas PIPPA Jr., et al., 2013) failed to calculate for the following reasons:    The systolic blood pressure is missing    Cannot find a previous HDL lab    Cannot find a previous total cholesterol lab    Estimated body mass index is 32.39 kg/m  as calculated from the following:    Height as of 7/11/19: 1.829 m (6').    Weight as of 7/11/19: 108.3 kg (238 lb 12.8 oz).     Weight management plan: diet and exercise.    He reports that he has never smoked. He has never used smokeless tobacco.      Counseling Resources:  ATP IV Guidelines  Pooled Cohorts Equation Calculator  FRAX Risk Assessment  ICSI Preventive Guidelines  Dietary Guidelines for Americans, 2010  USDA's MyPlate  ASA Prophylaxis  Lung CA Screening    Santi Wright MD  [facility]

## 2020-12-05 LAB
GAMMA INTERFERON BACKGROUND BLD IA-ACNC: 0.07 IU/ML
M TB IFN-G CD4+ BCKGRND COR BLD-ACNC: 9.93 IU/ML
M TB TUBERC IFN-G BLD QL: NEGATIVE
MITOGEN IGNF BCKGRD COR BLD-ACNC: 0 IU/ML
MITOGEN IGNF BCKGRD COR BLD-ACNC: 0 IU/ML

## 2020-12-22 ENCOUNTER — TELEPHONE (OUTPATIENT)
Dept: FAMILY MEDICINE | Facility: CLINIC | Age: 45
End: 2020-12-22

## 2020-12-22 NOTE — TELEPHONE ENCOUNTER
Reason for Call:  Request for results:    Name of test or procedure: Lab work for Dr. Marrufo with [facility] specialist    Date of test of procedure: 12/3/2020    Location of the test or procedure:  [facility]     OK to leave the result message on voice mail or with a family member? YES    Phone number Patient can be reached at:  Home number on file [phone] (home)    Additional comments: Wife called requesting that Lab work from 12/3/2020 be sent to [facility] at  Dr. Marurfo is the ordering provider.     Their fax is [phone]    Call taken on 12/22/2020 at 11:54 AM by Lindsey Crowley

## 2020-12-22 NOTE — TELEPHONE ENCOUNTER
12/03/20 lab results faxed to Dr Osborn, ordering provider, at [phone].  Right Fax confirmed December 22, 2020 at 11:59 AM. Updated pt's wife Becky that results were faxed    Kassandra Diggs

## 2021-09-12 ENCOUNTER — HEALTH MAINTENANCE LETTER (OUTPATIENT)
Age: 46
End: 2021-09-12

## 2022-02-27 ENCOUNTER — HEALTH MAINTENANCE LETTER (OUTPATIENT)
Age: 47
End: 2022-02-27

## 2022-08-16 ENCOUNTER — OFFICE VISIT (OUTPATIENT)
Dept: FAMILY MEDICINE | Facility: CLINIC | Age: 47
End: 2022-08-16
Payer: COMMERCIAL

## 2022-08-16 VITALS
SYSTOLIC BLOOD PRESSURE: 136 MMHG | HEART RATE: 68 BPM | RESPIRATION RATE: 14 BRPM | HEIGHT: 72 IN | DIASTOLIC BLOOD PRESSURE: 95 MMHG | OXYGEN SATURATION: 98 % | WEIGHT: 231.4 LBS | TEMPERATURE: 99 F | BODY MASS INDEX: 31.34 KG/M2

## 2022-08-16 DIAGNOSIS — I10 HTN, GOAL BELOW 130/80: Primary | ICD-10-CM

## 2022-08-16 DIAGNOSIS — Z86.79 HISTORY OF CEREBRAL ANEURYSM: ICD-10-CM

## 2022-08-16 LAB
ALBUMIN SERPL-MCNC: 4.1 G/DL (ref 3.4–5)
ALP SERPL-CCNC: 53 U/L (ref 40–150)
ALT SERPL W P-5'-P-CCNC: 53 U/L (ref 0–70)
ANION GAP SERPL CALCULATED.3IONS-SCNC: 6 MMOL/L (ref 3–14)
AST SERPL W P-5'-P-CCNC: 38 U/L (ref 0–45)
BILIRUB SERPL-MCNC: 0.7 MG/DL (ref 0.2–1.3)
BUN SERPL-MCNC: 12 MG/DL (ref 7–30)
CALCIUM SERPL-MCNC: 9.5 MG/DL (ref 8.5–10.1)
CHLORIDE BLD-SCNC: 101 MMOL/L (ref 94–109)
CO2 SERPL-SCNC: 31 MMOL/L (ref 20–32)
CREAT SERPL-MCNC: 0.76 MG/DL (ref 0.66–1.25)
CREAT UR-MCNC: 107 MG/DL
GFR SERPL CREATININE-BSD FRML MDRD: >90 ML/MIN/1.73M2
GLUCOSE BLD-MCNC: 94 MG/DL (ref 70–99)
MICROALBUMIN UR-MCNC: <5 MG/L
MICROALBUMIN/CREAT UR: NORMAL MG/G{CREAT}
POTASSIUM BLD-SCNC: 3.3 MMOL/L (ref 3.4–5.3)
PROT SERPL-MCNC: 8 G/DL (ref 6.8–8.8)
SODIUM SERPL-SCNC: 138 MMOL/L (ref 133–144)

## 2022-08-16 PROCEDURE — 99213 OFFICE O/P EST LOW 20 MIN: CPT | Performed by: INTERNAL MEDICINE

## 2022-08-16 PROCEDURE — 80053 COMPREHEN METABOLIC PANEL: CPT | Performed by: INTERNAL MEDICINE

## 2022-08-16 PROCEDURE — 36415 COLL VENOUS BLD VENIPUNCTURE: CPT | Performed by: INTERNAL MEDICINE

## 2022-08-16 PROCEDURE — 82043 UR ALBUMIN QUANTITATIVE: CPT | Performed by: INTERNAL MEDICINE

## 2022-08-16 RX ORDER — BISOPROLOL FUMARATE AND HYDROCHLOROTHIAZIDE 5; 6.25 MG/1; MG/1
1 TABLET ORAL DAILY
Qty: 90 TABLET | Refills: 3 | Status: SHIPPED | OUTPATIENT
Start: 2022-08-16 | End: 2023-08-11

## 2022-08-16 RX ORDER — RAMIPRIL 10 MG/1
CAPSULE ORAL
Qty: 90 CAPSULE | Refills: 3 | Status: SHIPPED | OUTPATIENT
Start: 2022-08-16 | End: 2023-08-11

## 2022-08-16 ASSESSMENT — PAIN SCALES - GENERAL: PAINLEVEL: NO PAIN (0)

## 2022-08-16 NOTE — PROGRESS NOTES
ADEOLA Coombs is a 47 year old accompanied by his spouse, presenting for the following health issues:     Hypertension Follow-up      Outpatient blood pressures monitoring: yes    Low Salt Diet: no    Adverse effects: no    Compliance: good    Secondary causes: none    Chronic kidney disease: no    Hyperthyroidism: no    Anxiety: no    Decongestants: no    Substance abuse: no    Diabetes: no    Ischemic heart disease: no    Stroke: yes (subarachnoid hemorrhage due to ruptured cerebral aneurysm).    Hyperlipidemia: yes        REVIEW OF SYSTEMS   CONSTITUTIONAL: NEGATIVE for fever, chills, change in weight  INTEGUMENTARY/SKIN: NEGATIVE for worrisome rashes, moles or lesions  EYES: NEGATIVE for vision changes or irritation  ENT/MOUTH: NEGATIVE for ear, mouth and throat problems  RESP: NEGATIVE for significant cough or SOB  CV: NEGATIVE for chest pain, palpitations or peripheral edema  GI: NEGATIVE for nausea, abdominal pain, heartburn, or change in bowel habits  : NEGATIVE for frequency, dysuria, or hematuria  MUSCULOSKELETAL: NEGATIVE for significant arthralgias or myalgia  NEURO: NEGATIVE for weakness, dizziness or paresthesias  ENDOCRINE: NEGATIVE for temperature intolerance, skin/hair changes  HEME: NEGATIVE for bleeding problems  PSYCHIATRIC: NEGATIVE for changes in mood or affect      OBJECTIVE   BP (!) 136/95 (BP Location: Left arm, Patient Position: Sitting, Cuff Size: Adult Large)   Pulse 68   Temp 99  F (37.2  C) (Tympanic)   Resp 14   Ht 1.829 m (6')   Wt 105 kg (231 lb 6.4 oz)   SpO2 98%   BMI 31.38 kg/m    Physical Exam   GENERAL: healthy, alert and no distress  EYES: Eyes grossly normal to inspection  HENT: normal cephalic/atraumatic  RESP: lungs clear to auscultation - no rales, rhonchi or wheezes  CV: regular rate and rhythm, normal S1 S2, no S3 or S4, no murmur, click or rub, no peripheral edema and peripheral pulses strong  MS: no gross musculoskeletal defects noted, no edema  NEURO:  Normal strength and tone, mentation intact and speech normal  PSYCH: mentation appears normal, affect normal/bright      ASSESSMENT/PLAN  HTN, goal below 130/80  - REVIEW OF HEALTH MAINTENANCE PROTOCOL ORDERS  - Comprehensive metabolic panel  - Albumin Random Urine Quantitative with Creat Ratio  - bisoprolol-hydrochlorothiazide (ZIAC) 5-6.25 MG tablet; Take 1 tablet by mouth daily  - ramipril (ALTACE) 10 MG capsule; TAKE 1 CAPSULE BY MOUTH  DAILY WITH DINNER    History of cerebral aneurysm  - IR Carotid Cerebral Angiogram Bilateral; Future    Disposition:  Follow-up in 6 months.    Santi Wright MD  Internal Medicine          .  ..

## 2022-08-16 NOTE — PATIENT INSTRUCTIONS
At [facility], we strive to deliver an exceptional experience to you, every time we see you. If you receive a survey, please complete it as we do value your feedback.  If you have MyChart, you can expect to receive results automatically within 24 hours of their completion.  Your provider will send a note interpreting your results as well.   If you do not have MyChart, you should receive your results in about a week by mail.    Your care team:                            Family Medicine Internal Medicine   MD Santi Marie MD Shantel Branch-Fleming, MD Srinivasa Vaka, MD Katya Belousova, PRAKASH Caban CNP, MD ([facility]   Eliu Wolfe, MD Usha Zamudio MD Amelia Massimini APRN CNP Kim Thein, APRN CNP Bethany Templen, MD             Clinic hours: Monday - Thursday 7 am-6 pm; Fridays 7 am-5 pm.   Urgent care: Monday - Friday 10 am- 8 pm; Saturday and Sunday 9 am-5 pm.    Clinic: [phone]       [facility]: Monday - Thursday 8 am - 7 pm; Friday 8 am - 6 pm  [facility]: [phone]

## 2022-09-12 ENCOUNTER — MYC MEDICAL ADVICE (OUTPATIENT)
Dept: FAMILY MEDICINE | Facility: CLINIC | Age: 47
End: 2022-09-12

## 2022-09-12 DIAGNOSIS — Z86.79 HISTORY OF CEREBRAL ANEURYSM: Primary | ICD-10-CM

## 2022-11-19 ENCOUNTER — HEALTH MAINTENANCE LETTER (OUTPATIENT)
Age: 47
End: 2022-11-19

## 2023-01-25 ENCOUNTER — NURSE TRIAGE (OUTPATIENT)
Dept: NURSING | Facility: CLINIC | Age: 48
End: 2023-01-25
Payer: COMMERCIAL

## 2023-01-25 NOTE — TELEPHONE ENCOUNTER
Becky (wife) calls and says that her  has high blood pressure. Becky says that pt. Has blood shot eyes and a rash on his arms. Becky says that pt's blood pressure = 164/101 @ 1745, 175/107 @ 1758. Becky says that pt. Will see the [facility]ati Within 2-3 days. COVID 19 Nurse Triage Plan/Patient Instructions    Please be aware that novel coronavirus (COVID-19) may be circulating in the community. If you develop symptoms such as fever, cough, or SOB or if you have concerns about the presence of another infection including coronavirus (COVID-19), please contact your health care provider or visit [email or web address].     Disposition/Instructions    In-Person Visit with provider recommended. Reference Visit Selection Guide.    Thank you for taking steps to prevent the spread of this virus.  o Limit your contact with others.  o Wear a simple mask to cover your cough.  o Wash your hands well and often.    Resources    [facility]: About COVID-19: [email or web address]    CDC: What to Do If You're Sick: www.cdc.gov/coronavirus/2019-ncov/about/steps-when-sick.html    CDC: Ending Home Isolation: www.cdc.gov/coronavirus/2019-ncov/hcp/disposition-in-home-patients.html     CDC: Caring for Someone: www.cdc.gov/coronavirus/2019-ncov/if-you-are-sick/care-for-someone.html     [facility]: Interim Guidance for Hospital Discharge to Home: www.health.[facility].mn.us/diseases/coronavirus/hcp/hospdischarge.pdf    [facility] clinical trials (COVID-19 research studies): [email or web address]-trials     Below are the COVID-19 hotlines at the [facility] of Health ([facility]). Interpreters are available.   o For health questions: Call [phone] or [phone] (7 a.m. to 7 p.m.)  o For questions about schools and childcare: Call [phone] or 1-906.374.3689 (7 a.m. to 7 p.m.)                     Reason for Disposition    Systolic BP  >= 160 OR Diastolic >= 100    Additional Information     Call transferred.    Informed her of Dr. Diane's recommendations below. Gave Grandma number for . She has no further questions or concerns for Dr. Diane.    Negative: Difficult to awaken or acting confused (e.g., disoriented, slurred speech)    Negative: SEVERE difficulty breathing (e.g., struggling for each breath, speaks in single words)    Negative: [1] Weakness of the face, arm or leg on one side of the body AND [2] new-onset    Negative: [1] Numbness (i.e., loss of sensation) of the face, arm or leg on one side of the body AND [2] new-onset    Negative: [1] Chest pain lasts > 5 minutes AND [2] history of heart disease (i.e., heart attack, bypass surgery, angina, angioplasty, CHF)    Negative: [1] Chest pain AND [2] took nitrogylcerin AND [3] pain was not relieved    Negative: Sounds like a life-threatening emergency to the triager    Negative: Symptom is main concern (e.g., headache, chest pain)    Negative: Low blood pressure is main concern    Negative: [1] Systolic BP  >= 160 OR Diastolic >= 100 AND [2] cardiac or neurologic symptoms (e.g., chest pain, difficulty breathing, unsteady gait, blurred vision)    Negative: [1] Pregnant 20 or more weeks (or postpartum < 6 weeks) AND [2] new hand or face swelling    Negative: [1] Pregnant 20 or more weeks (or postpartum < 6 weeks) AND [2] Systolic BP >= 160 OR Diastolic >= 100    Negative: [1] Systolic BP  >= 200 OR Diastolic >= 120 AND [2] having NO cardiac or neurologic symptoms    Negative: [1] Pregnant 20 or more weeks (or postpartum < 6 weeks) AND [2] Systolic BP  >= 140 OR Diastolic >= 90    Negative: [1] Systolic BP  >= 180 OR Diastolic >= 110 AND [2] missed most recent dose of blood pressure medication    Negative: Systolic BP  >= 180 OR Diastolic >= 110    Negative: Ran out of BP medications    Protocols used: BLOOD PRESSURE - H[facility]

## 2023-04-09 ENCOUNTER — HEALTH MAINTENANCE LETTER (OUTPATIENT)
Age: 48
End: 2023-04-09

## 2023-07-12 ENCOUNTER — TELEPHONE (OUTPATIENT)
Dept: FAMILY MEDICINE | Facility: CLINIC | Age: 48
End: 2023-07-12
Payer: COMMERCIAL

## 2023-07-12 NOTE — TELEPHONE ENCOUNTER
Patient Quality Outreach    Patient is due for the following:   Hypertension -  BP check and Hypertension follow-up visit  Colon Cancer Screening  Physical Preventive Adult Physical      Topic Date Due     COVID-19 Vaccine (1) Never done     Hepatitis B Vaccine (3 of 3 - 3-dose series) [birth year]/1978     Zoster (Shingles) Vaccine (1 of 2) 04/10/2023       Next Steps:   Schedule a Adult Preventative    Type of outreach:    Sent [employer] message.      Questions for provider review:    None           Lexie Young MA

## 2023-08-11 DIAGNOSIS — I10 HTN, GOAL BELOW 130/80: ICD-10-CM

## 2023-08-11 RX ORDER — RAMIPRIL 10 MG/1
CAPSULE ORAL
Qty: 90 CAPSULE | Refills: 0 | Status: SHIPPED | OUTPATIENT
Start: 2023-08-11 | End: 2023-12-08

## 2023-08-11 RX ORDER — BISOPROLOL FUMARATE AND HYDROCHLOROTHIAZIDE 5; 6.25 MG/1; MG/1
1 TABLET ORAL DAILY
Qty: 90 TABLET | Refills: 0 | Status: SHIPPED | OUTPATIENT
Start: 2023-08-11 | End: 2023-12-08

## 2023-12-06 DIAGNOSIS — I10 HTN, GOAL BELOW 130/80: ICD-10-CM

## 2023-12-08 RX ORDER — BISOPROLOL FUMARATE AND HYDROCHLOROTHIAZIDE 5; 6.25 MG/1; MG/1
1 TABLET ORAL DAILY
Qty: 90 TABLET | Refills: 0 | Status: SHIPPED | OUTPATIENT
Start: 2023-12-08 | End: 2024-02-22

## 2023-12-08 RX ORDER — RAMIPRIL 10 MG/1
CAPSULE ORAL
Qty: 90 CAPSULE | Refills: 0 | Status: SHIPPED | OUTPATIENT
Start: 2023-12-08 | End: 2024-02-22

## 2023-12-11 ENCOUNTER — MYC MEDICAL ADVICE (OUTPATIENT)
Dept: FAMILY MEDICINE | Facility: CLINIC | Age: 48
End: 2023-12-11
Payer: COMMERCIAL

## 2023-12-11 DIAGNOSIS — D68.9 COAGULOPATHY (H): Primary | ICD-10-CM

## 2023-12-13 ENCOUNTER — LAB (OUTPATIENT)
Dept: LAB | Facility: CLINIC | Age: 48
End: 2023-12-13
Payer: COMMERCIAL

## 2023-12-13 DIAGNOSIS — D68.9 COAGULOPATHY (H): ICD-10-CM

## 2023-12-13 LAB
BASOPHILS # BLD AUTO: 0 10E3/UL (ref 0–0.2)
BASOPHILS NFR BLD AUTO: 0 %
EOSINOPHIL # BLD AUTO: 0.4 10E3/UL (ref 0–0.7)
EOSINOPHIL NFR BLD AUTO: 5 %
ERYTHROCYTE [DISTWIDTH] IN BLOOD BY AUTOMATED COUNT: 11.7 % (ref 10–15)
HCT VFR BLD AUTO: 38.4 % (ref 40–53)
HGB BLD-MCNC: 13.1 G/DL (ref 13.3–17.7)
IMM GRANULOCYTES # BLD: 0.1 10E3/UL
IMM GRANULOCYTES NFR BLD: 1 %
LYMPHOCYTES # BLD AUTO: 1.1 10E3/UL (ref 0.8–5.3)
LYMPHOCYTES NFR BLD AUTO: 12 %
MCH RBC QN AUTO: 31 PG (ref 26.5–33)
MCHC RBC AUTO-ENTMCNC: 34.1 G/DL (ref 31.5–36.5)
MCV RBC AUTO: 91 FL (ref 78–100)
MONOCYTES # BLD AUTO: 0.8 10E3/UL (ref 0–1.3)
MONOCYTES NFR BLD AUTO: 9 %
NEUTROPHILS # BLD AUTO: 6.7 10E3/UL (ref 1.6–8.3)
NEUTROPHILS NFR BLD AUTO: 73 %
PLATELET # BLD AUTO: 175 10E3/UL (ref 150–450)
RBC # BLD AUTO: 4.23 10E6/UL (ref 4.4–5.9)
WBC # BLD AUTO: 9.1 10E3/UL (ref 4–11)

## 2023-12-13 PROCEDURE — 80053 COMPREHEN METABOLIC PANEL: CPT

## 2023-12-13 PROCEDURE — 85730 THROMBOPLASTIN TIME PARTIAL: CPT

## 2023-12-13 PROCEDURE — 36415 COLL VENOUS BLD VENIPUNCTURE: CPT

## 2023-12-13 PROCEDURE — 85610 PROTHROMBIN TIME: CPT

## 2023-12-13 PROCEDURE — 85045 AUTOMATED RETICULOCYTE COUNT: CPT

## 2023-12-13 PROCEDURE — 99207 BLOOD MORPHOLOGY PATHOLOGIST REVIEW: CPT | Performed by: PATHOLOGY

## 2023-12-13 PROCEDURE — 85025 COMPLETE CBC W/AUTO DIFF WBC: CPT

## 2023-12-14 LAB
ALBUMIN SERPL BCG-MCNC: 4.4 G/DL (ref 3.5–5.2)
ALP SERPL-CCNC: 69 U/L (ref 40–150)
ALT SERPL W P-5'-P-CCNC: 27 U/L (ref 0–70)
ANION GAP SERPL CALCULATED.3IONS-SCNC: 10 MMOL/L (ref 7–15)
APTT PPP: 28 SECONDS (ref 22–38)
AST SERPL W P-5'-P-CCNC: 25 U/L (ref 0–45)
BILIRUB SERPL-MCNC: 0.3 MG/DL
BUN SERPL-MCNC: 13.1 MG/DL (ref 6–20)
CALCIUM SERPL-MCNC: 9.6 MG/DL (ref 8.6–10)
CHLORIDE SERPL-SCNC: 101 MMOL/L (ref 98–107)
CREAT SERPL-MCNC: 0.79 MG/DL (ref 0.67–1.17)
DEPRECATED HCO3 PLAS-SCNC: 27 MMOL/L (ref 22–29)
EGFRCR SERPLBLD CKD-EPI 2021: >90 ML/MIN/1.73M2
GLUCOSE SERPL-MCNC: 100 MG/DL (ref 70–99)
INR PPP: 1.17 (ref 0.85–1.15)
POTASSIUM SERPL-SCNC: 3.7 MMOL/L (ref 3.4–5.3)
PROT SERPL-MCNC: 7.5 G/DL (ref 6.4–8.3)
RETICS # AUTO: 0.09 10E6/UL (ref 0.03–0.1)
RETICS/RBC NFR AUTO: 2.1 % (ref 0.5–2)
SODIUM SERPL-SCNC: 138 MMOL/L (ref 135–145)

## 2023-12-15 ENCOUNTER — TELEPHONE (OUTPATIENT)
Dept: FAMILY MEDICINE | Facility: CLINIC | Age: 48
End: 2023-12-15
Payer: COMMERCIAL

## 2023-12-15 DIAGNOSIS — R79.1 ELEVATED INR: Primary | ICD-10-CM

## 2023-12-15 LAB
PATH REPORT.COMMENTS IMP SPEC: NORMAL
PATH REPORT.COMMENTS IMP SPEC: NORMAL
PATH REPORT.FINAL DX SPEC: NORMAL
PATH REPORT.MICROSCOPIC SPEC OTHER STN: NORMAL
PATH REPORT.MICROSCOPIC SPEC OTHER STN: NORMAL

## 2023-12-15 RX ORDER — PHYTONADIONE 5 MG/1
5 TABLET ORAL ONCE
Qty: 1 TABLET | Refills: 0 | Status: SHIPPED | OUTPATIENT
Start: 2023-12-15 | End: 2023-12-15

## 2023-12-15 NOTE — TELEPHONE ENCOUNTER
Pt's wife calling regarding pt's lab results. No results note in chart.     Routing to provider to review.     Santa Cifuentes RN

## 2023-12-26 ENCOUNTER — MYC MEDICAL ADVICE (OUTPATIENT)
Dept: FAMILY MEDICINE | Facility: CLINIC | Age: 48
End: 2023-12-26
Payer: COMMERCIAL

## 2023-12-26 DIAGNOSIS — K06.8 GUM HEMORRHAGE: Primary | ICD-10-CM

## 2023-12-26 DIAGNOSIS — D68.00 VON WILLEBRAND DISEASE (H): ICD-10-CM

## 2023-12-26 NOTE — TELEPHONE ENCOUNTER
Routing to provider to review and advise regarding labs.      Kristina Kjellberg, MSN, RN  [facility] Primary Care Triage

## 2023-12-29 ENCOUNTER — LAB (OUTPATIENT)
Dept: LAB | Facility: CLINIC | Age: 48
End: 2023-12-29
Payer: COMMERCIAL

## 2023-12-29 DIAGNOSIS — D68.00 VON WILLEBRAND DISEASE (H): ICD-10-CM

## 2023-12-29 DIAGNOSIS — K06.8 GUM HEMORRHAGE: ICD-10-CM

## 2023-12-29 PROCEDURE — 85245 CLOT FACTOR VIII VW RISTOCTN: CPT

## 2023-12-29 PROCEDURE — 85240 CLOT FACTOR VIII AHG 1 STAGE: CPT

## 2023-12-29 PROCEDURE — 85246 CLOT FACTOR VIII VW ANTIGEN: CPT

## 2023-12-29 PROCEDURE — 85390 FIBRINOLYSINS SCREEN I&R: CPT | Performed by: PATHOLOGY

## 2023-12-29 PROCEDURE — 36415 COLL VENOUS BLD VENIPUNCTURE: CPT

## 2024-01-02 LAB
FACT VIII ACT/NOR PPP: 202 % (ref 55–200)
VON WILLEBRAND EVAL PPP-IMP: NORMAL
VWF AG ACT/NOR PPP IA: 236 % (ref 50–200)
VWF:AC ACT/NOR PPP IA: 252 % (ref 50–180)

## 2024-02-16 ENCOUNTER — TELEPHONE (OUTPATIENT)
Dept: FAMILY MEDICINE | Facility: CLINIC | Age: 49
End: 2024-02-16
Payer: COMMERCIAL

## 2024-02-16 DIAGNOSIS — I10 HTN, GOAL BELOW 130/80: ICD-10-CM

## 2024-02-16 RX ORDER — RAMIPRIL 10 MG/1
CAPSULE ORAL
Qty: 90 CAPSULE | Refills: 0 | Status: CANCELLED | OUTPATIENT
Start: 2024-02-16

## 2024-02-16 RX ORDER — BISOPROLOL FUMARATE AND HYDROCHLOROTHIAZIDE 5; 6.25 MG/1; MG/1
1 TABLET ORAL DAILY
Qty: 90 TABLET | Refills: 0 | Status: CANCELLED | OUTPATIENT
Start: 2024-02-16

## 2024-02-21 NOTE — TELEPHONE ENCOUNTER
Called and left a 2nd voicemail message to return our call to confirm which pharmacy he would like his refills sent to.  Cheyanne Weiss MA  [facility]   Primary Care

## 2024-02-21 NOTE — TELEPHONE ENCOUNTER
Called and left a voicemail message to return our call to confirm which pharmacy he would like his refills sent to.  Cheyanne Weiss MA  [facility]   Primary Care

## 2024-02-21 NOTE — TELEPHONE ENCOUNTER
Called and left a 3rd voicemail message to return our call to confirm which pharmacy he would like his refills sent to.

## 2024-02-21 NOTE — TELEPHONE ENCOUNTER
[facility] still [facility], was patient called to confirm correct pharmacy?     ramipril (ALTACE) 10 MG capsule 90 capsule 0 12/8/2023       bisoprolol-hydrochlorothiazide (ZIAC) 5-6.25 MG tablet 90 tablet 0 12/8/2023

## 2024-02-22 RX ORDER — BISOPROLOL FUMARATE AND HYDROCHLOROTHIAZIDE 5; 6.25 MG/1; MG/1
1 TABLET ORAL DAILY
Qty: 90 TABLET | Refills: 1 | Status: SHIPPED | OUTPATIENT
Start: 2024-02-22 | End: 2024-09-17

## 2024-02-22 RX ORDER — BISOPROLOL FUMARATE AND HYDROCHLOROTHIAZIDE 5; 6.25 MG/1; MG/1
1 TABLET ORAL DAILY
Start: 2024-02-22 | End: 2024-02-22

## 2024-02-22 RX ORDER — RAMIPRIL 10 MG/1
CAPSULE ORAL
Qty: 90 CAPSULE | Refills: 1 | Status: SHIPPED | OUTPATIENT
Start: 2024-02-22 | End: 2024-09-17

## 2024-02-22 RX ORDER — RAMIPRIL 10 MG/1
CAPSULE ORAL
Start: 2024-02-22 | End: 2024-02-22

## 2024-02-22 NOTE — TELEPHONE ENCOUNTER
Called and left a 4th voicemail message to return our call to confirm which pharmacy he would like his refills sent to.

## 2024-02-22 NOTE — TELEPHONE ENCOUNTER
Called and spoke to the patient and he states that the refills should [facility] to [facility]. Routed back to Dr Wright.  Cheyanne Weiss MA  [facility]   Primary Care

## 2024-02-22 NOTE — TELEPHONE ENCOUNTER
[facility] [phone] on 2/22/24 at 12:33pm. Scripts placed in tc copy bin.

## 2024-06-10 ENCOUNTER — TRANSFERRED RECORDS (OUTPATIENT)
Dept: HEALTH INFORMATION MANAGEMENT | Facility: CLINIC | Age: 49
End: 2024-06-10

## 2024-06-15 ENCOUNTER — HEALTH MAINTENANCE LETTER (OUTPATIENT)
Age: 49
End: 2024-06-15

## 2024-06-17 ENCOUNTER — LAB REQUISITION (OUTPATIENT)
Dept: LAB | Facility: CLINIC | Age: 49
End: 2024-06-17

## 2024-06-17 PROCEDURE — 88341 IMHCHEM/IMCYTCHM EA ADD ANTB: CPT | Mod: TC | Performed by: DENTIST

## 2024-06-21 ENCOUNTER — TELEPHONE (OUTPATIENT)
Dept: FAMILY MEDICINE | Facility: CLINIC | Age: 49
End: 2024-06-21
Payer: COMMERCIAL

## 2024-06-21 NOTE — TELEPHONE ENCOUNTER
Patient Quality Outreach    Patient is due for the following:   Hypertension -  BP check and Hypertension follow-up visit  Colon Cancer Screening  Physical Preventive Adult Physical      Topic Date Due    Hepatitis B Vaccine (3 of 3 - 3-dose series) 10/17/1978    Zoster (Shingles) Vaccine (1 of 2) Never done    COVID-19 Vaccine (1 - 2023-24 season) Never done       Next Steps:   Schedule a Adult Preventative    Type of outreach:    Sent [employer] message.      Questions for provider review:    None           Lexie Young MA

## 2024-08-27 ENCOUNTER — TRANSFERRED RECORDS (OUTPATIENT)
Dept: HEALTH INFORMATION MANAGEMENT | Facility: CLINIC | Age: 49
End: 2024-08-27
Payer: COMMERCIAL

## 2024-09-05 DIAGNOSIS — I10 HTN, GOAL BELOW 130/80: ICD-10-CM

## 2024-09-06 RX ORDER — RAMIPRIL 10 MG/1
CAPSULE ORAL
Qty: 90 CAPSULE | Refills: 3 | OUTPATIENT
Start: 2024-09-06

## 2024-09-06 RX ORDER — BISOPROLOL FUMARATE AND HYDROCHLOROTHIAZIDE 5; 6.25 MG/1; MG/1
1 TABLET ORAL DAILY
Qty: 90 TABLET | Refills: 3 | OUTPATIENT
Start: 2024-09-06

## 2024-09-06 NOTE — TELEPHONE ENCOUNTER
Called and LVM informing patient refill request has been denied due to the need for a follow up appointment. Advised patient to call back and schedule.    Sherrill Britt

## 2024-09-17 ENCOUNTER — VIRTUAL VISIT (OUTPATIENT)
Dept: FAMILY MEDICINE | Facility: CLINIC | Age: 49
End: 2024-09-17
Payer: COMMERCIAL

## 2024-09-17 ENCOUNTER — MYC REFILL (OUTPATIENT)
Dept: FAMILY MEDICINE | Facility: CLINIC | Age: 49
End: 2024-09-17

## 2024-09-17 DIAGNOSIS — Z13.6 CARDIOVASCULAR SCREENING; LDL GOAL LESS THAN 100: ICD-10-CM

## 2024-09-17 DIAGNOSIS — I10 HTN, GOAL BELOW 130/80: ICD-10-CM

## 2024-09-17 DIAGNOSIS — Z13.1 SCREENING FOR DIABETES MELLITUS: ICD-10-CM

## 2024-09-17 DIAGNOSIS — I10 HTN, GOAL BELOW 130/80: Primary | ICD-10-CM

## 2024-09-17 PROBLEM — K06.8 GUMS, BLEEDING: Status: ACTIVE | Noted: 2024-09-17

## 2024-09-17 PROCEDURE — 99442 PR PHYSICIAN TELEPHONE EVALUATION 11-20 MIN: CPT | Mod: 93 | Performed by: INTERNAL MEDICINE

## 2024-09-17 RX ORDER — BISOPROLOL FUMARATE AND HYDROCHLOROTHIAZIDE 5; 6.25 MG/1; MG/1
1 TABLET ORAL DAILY
Qty: 90 TABLET | Refills: 3 | Status: CANCELLED | OUTPATIENT
Start: 2024-09-17

## 2024-09-17 RX ORDER — PREDNISONE 10 MG/1
TABLET ORAL
COMMUNITY
Start: 2024-08-27

## 2024-09-17 RX ORDER — RAMIPRIL 10 MG/1
CAPSULE ORAL
Qty: 90 CAPSULE | Refills: 3 | Status: SHIPPED | OUTPATIENT
Start: 2024-09-17

## 2024-09-17 RX ORDER — BISOPROLOL FUMARATE AND HYDROCHLOROTHIAZIDE 5; 6.25 MG/1; MG/1
1 TABLET ORAL DAILY
Qty: 90 TABLET | Refills: 3 | Status: SHIPPED | OUTPATIENT
Start: 2024-09-17 | End: 2024-09-18

## 2024-09-17 NOTE — PATIENT INSTRUCTIONS
At [facility], we strive to deliver an exceptional experience to you, every time we see you. If you receive a survey, please let us know what we are doing well and/or what we could improve upon, as we do value your feedback.  If you have MyChart, you can expect to receive results automatically within 24 hours of their completion.  Your provider will send a note interpreting your results as well.   If you do not have MyChart, you should receive your results in about a week by mail.    Your care team:                            Family Medicine Internal Medicine   MD Santi Marie, MD Fide Ramos, MD Johnson Bullard, MD Ivy Trinh, PAAlexaC    Jose Lindsey, MD Pediatrics   Radha Martini, MD Usha Navas, MD Nadira Jones, APRN CNP Donna Goncalves APRN CNP   MD Rosa Arroyo, MD Domi Reyna, CNP     Antoni Iglesias, CNP Same-Day Provider (No follow-up visits)   PRAKASH Nguyen, DNP Kathy Royal, PRAKASH Ibrahim, FNP, BC [facility] [employer]     Clinic hours: Monday - Thursday 7 am-6 pm; Fridays 7 am-5 pm.   Urgent care: Monday - Friday 10 am- 8 pm; Saturday and Sunday 9 am-5 pm.    Clinic: [phone]       [facility]: Monday - Thursday 8 am - 7 pm; Friday 8 am - 6 pm  [facility]: [phone]

## 2024-09-17 NOTE — PROGRESS NOTES
Malvin is a 49 year old who is being evaluated via a billable telephone visit.    How would you like to obtain your AVS? MyChart  If the telephone visit is dropped, the invitation should be resent by: Text to cell phone: [phone]  Will anyone else be joining your telephone visit? No  Patient location: Home  Provider location: On-site    [facility] Internal Medicine Progress Note           Assessment and Plan:     HTN, goal below 130/80  - ramipril (ALTACE) 10 MG capsule; TAKE 1 CAPSULE BY MOUTH ONCE DAILY with dinner  - Albumin Random Urine Quantitative with Creat Ratio; Future  - Basic metabolic panel; Future    Screening for diabetes mellitus  - Basic metabolic panel; Future  - Hemoglobin A1c; Future    CARDIOVASCULAR SCREENING; LDL GOAL LESS THAN 100  - Lipid panel reflex to direct LDL Fasting; Future         Interval History:     Hypertension Follow-up    Do you check your blood pressure regularly outside of the clinic? No   Are you following a low salt diet? No  Are your blood pressures ever more than 140 on the top number (systolic) OR more   than 90 on the bottom number (diastolic), for example 140/90? No  How many servings of fruits and vegetables do you eat daily?  2-3  On average, how many sweetened beverages do you drink each day (Examples: soda, juice, sweet tea, etc.  Do NOT count diet or artificially sweetened beverages)?   3  How many days per week do you exercise enough to make your heart beat faster? 3 or less  How many minutes a day do you exercise enough to make your heart beat faster? 30 - 60  How many days per week do you miss taking your medication? 0    Updates:  -Still has gum bleeding, which the patient believes may be causing gum bleeding.  -Previously treated with Skyrizi, Tremfya and Taltz           Significant Problems:     Patient Active Problem List   Diagnosis    Personal history of subarachnoid hemorrhage    Hypertension goal BP (blood pressure) < 140/90     CARDIOVASCULAR SCREENING; LDL GOAL LESS THAN 130    Right bundle branch block    Drug-induced erectile dysfunction    Psoriasis    Class 1 obesity due to excess calories with body mass index (BMI) of 32.0 to 32.9 in adult    Gums, bleeding              Review of Systems:     CONSTITUTIONAL: NEGATIVE for fever, chills, change in weight  INTEGUMENTARY/SKIN: NEGATIVE for worrisome rashes, moles or lesions  EYES: NEGATIVE for vision changes or irritation  ENT/MOUTH: NEGATIVE for ear, mouth and throat problems  RESP: NEGATIVE for significant cough or SOB  BREAST: NEGATIVE for masses, tenderness or discharge  CV: NEGATIVE for chest pain, palpitations or peripheral edema  GI: NEGATIVE for nausea, abdominal pain, heartburn, or change in bowel habits  : NEGATIVE for frequency, dysuria, or hematuria  MUSCULOSKELETAL: NEGATIVE for significant arthralgias or myalgia  NEURO: NEGATIVE for weakness, dizziness or paresthesias  ENDOCRINE: NEGATIVE for temperature intolerance, skin/hair changes  HEME: NEGATIVE for bleeding problems  PSYCHIATRIC: NEGATIVE for changes in mood or affect            Medications:     Current Outpatient Medications   Medication Sig Dispense Refill    Cyanocobalamin (VITAMIN B 12 PO) Take 250 mcg by mouth daily      multivitamin, therapeutic with minerals (THERA-VIT-M) TABS tablet Take 1 tablet by mouth daily      predniSONE (DELTASONE) 10 MG tablet       ramipril (ALTACE) 10 MG capsule TAKE 1 CAPSULE BY MOUTH ONCE DAILY with dinner 90 capsule 3    Vitamin K 100 MCG TABS Take 1 tablet by mouth daily 30 tablet 2    bisoprolol-hydrochlorothiazide (ZIAC) 5-6.25 MG tablet Take 1 tablet by mouth daily. 90 tablet 3     No current facility-administered medications for this visit.     Facility-Administered Medications Ordered in Other Visits   Medication Dose Route Frequency Provider Last Rate Last Admin    heparin (porcine) 1000 UNIT/ML injection                      Physical Exam:   Vital signs and physical exam  not obtained due to nature of visit.          Data:   Epic reviewed.     Disposition:  Follow-up in 24 weeks.    Santi Wright MD  Internal Medicine  [facility] Team     Phone call duration: 15 minutes  Start: 8:00  AM  End: 8:15  AM

## 2024-09-18 RX ORDER — BISOPROLOL FUMARATE AND HYDROCHLOROTHIAZIDE 5; 6.25 MG/1; MG/1
1 TABLET ORAL DAILY
Qty: 90 TABLET | Refills: 3 | Status: SHIPPED | OUTPATIENT
Start: 2024-09-18

## 2024-09-18 NOTE — TELEPHONE ENCOUNTER
Clinic RN: Please contact patient because we need confirmation this is a pharmacy change. Pend correct pharmacy and route to corresponding refill pool.      SHANNON OlivaresN, RN  [facility]

## 2024-10-11 ENCOUNTER — TRANSFERRED RECORDS (OUTPATIENT)
Dept: HEALTH INFORMATION MANAGEMENT | Facility: CLINIC | Age: 49
End: 2024-10-11
Payer: COMMERCIAL

## 2024-10-15 ENCOUNTER — MEDICAL CORRESPONDENCE (OUTPATIENT)
Dept: HEALTH INFORMATION MANAGEMENT | Facility: CLINIC | Age: 49
End: 2024-10-15
Payer: COMMERCIAL

## 2025-04-01 DIAGNOSIS — I10 HTN, GOAL BELOW 130/80: ICD-10-CM

## 2025-04-01 RX ORDER — RAMIPRIL 10 MG/1
CAPSULE ORAL
Qty: 90 CAPSULE | Refills: 0 | Status: SHIPPED | OUTPATIENT
Start: 2025-04-01

## 2025-04-01 RX ORDER — BISOPROLOL FUMARATE AND HYDROCHLOROTHIAZIDE 5; 6.25 MG/1; MG/1
1 TABLET ORAL DAILY
Qty: 90 TABLET | Refills: 0 | Status: SHIPPED | OUTPATIENT
Start: 2025-04-01

## 2025-04-01 RX ORDER — BISOPROLOL FUMARATE AND HYDROCHLOROTHIAZIDE 5; 6.25 MG/1; MG/1
TABLET ORAL
Refills: 0 | OUTPATIENT
Start: 2025-04-01

## 2025-04-01 RX ORDER — RAMIPRIL 10 MG/1
CAPSULE ORAL
Refills: 0 | OUTPATIENT
Start: 2025-04-01

## 2025-06-18 ENCOUNTER — TELEPHONE (OUTPATIENT)
Dept: FAMILY MEDICINE | Facility: CLINIC | Age: 50
End: 2025-06-18
Payer: COMMERCIAL

## 2025-06-18 NOTE — TELEPHONE ENCOUNTER
Patient Quality Outreach    Patient is due for the following:   Hypertension -  BP check  Colon Cancer Screening  Physical Preventive Adult Physical      Topic Date Due    Hepatitis B Vaccine (3 of 3 - 3-dose series) 10[birth year]17/1980    Pneumococcal Vaccine (1 of 1 - PCV) Never done    Zoster (Shingles) Vaccine (1 of 2) Never done    COVID-19 Vaccine (1 - 2024-25 season) Never done       Action(s) Taken:   Schedule a Adult Preventative    Type of outreach:    Sent [employer] message.    Questions for provider review:    None         Lexie Young MA  Chart routed to None.

## 2025-07-06 ENCOUNTER — HEALTH MAINTENANCE LETTER (OUTPATIENT)
Age: 50
End: 2025-07-06

## 2025-08-19 DIAGNOSIS — I10 HTN, GOAL BELOW 130/80: ICD-10-CM

## 2025-08-20 RX ORDER — RAMIPRIL 10 MG/1
CAPSULE ORAL
Qty: 90 CAPSULE | Refills: 0 | Status: SHIPPED | OUTPATIENT
Start: 2025-08-20

## 2025-08-26 DIAGNOSIS — I10 HTN, GOAL BELOW 130/80: ICD-10-CM

## 2025-08-28 RX ORDER — BISOPROLOL FUMARATE AND HYDROCHLOROTHIAZIDE 5; 6.25 MG/1; MG/1
1 TABLET ORAL DAILY
Qty: 90 TABLET | Refills: 0 | Status: SHIPPED | OUTPATIENT
Start: 2025-08-28

## (undated) RX ORDER — FENTANYL CITRATE 50 UG/ML
INJECTION, SOLUTION INTRAMUSCULAR; INTRAVENOUS
Status: DISPENSED
Start: 2017-01-23